# Patient Record
Sex: FEMALE | Race: WHITE | NOT HISPANIC OR LATINO | Employment: OTHER | URBAN - METROPOLITAN AREA
[De-identification: names, ages, dates, MRNs, and addresses within clinical notes are randomized per-mention and may not be internally consistent; named-entity substitution may affect disease eponyms.]

---

## 2017-01-09 ENCOUNTER — PATIENT MESSAGE (OUTPATIENT)
Dept: NEUROLOGY | Facility: MEDICAL CENTER | Age: 47
End: 2017-01-09

## 2017-01-09 DIAGNOSIS — G43.709 CHRONIC MIGRAINE WITHOUT AURA WITHOUT STATUS MIGRAINOSUS, NOT INTRACTABLE: ICD-10-CM

## 2017-01-10 ENCOUNTER — TELEPHONE (OUTPATIENT)
Dept: NEUROLOGY | Facility: MEDICAL CENTER | Age: 47
End: 2017-01-10

## 2017-02-21 DIAGNOSIS — G43.109 MIGRAINE WITH AURA AND WITHOUT STATUS MIGRAINOSUS, NOT INTRACTABLE: ICD-10-CM

## 2017-02-21 RX ORDER — AMITRIPTYLINE HYDROCHLORIDE 10 MG/1
TABLET, FILM COATED ORAL
Qty: 150 TAB | Refills: 3 | Status: SHIPPED | OUTPATIENT
Start: 2017-02-21 | End: 2017-05-22

## 2017-02-21 NOTE — TELEPHONE ENCOUNTER
Was the patient seen in the last year in this department? Yes     Does patient have an active prescription for medications requested? No     Received Request Via: Pharmacy       90 day supply request.

## 2017-02-25 RX ORDER — ACYCLOVIR 400 MG/1
TABLET ORAL
Qty: 60 TAB | Refills: 0 | Status: SHIPPED | OUTPATIENT
Start: 2017-02-25 | End: 2017-04-02 | Stop reason: SDUPTHER

## 2017-04-03 RX ORDER — ACYCLOVIR 400 MG/1
TABLET ORAL
Qty: 60 TAB | Refills: 2 | Status: SHIPPED | OUTPATIENT
Start: 2017-04-03 | End: 2017-07-10 | Stop reason: SDUPTHER

## 2017-05-22 RX ORDER — AMITRIPTYLINE HYDROCHLORIDE 25 MG/1
TABLET, FILM COATED ORAL
Qty: 90 TAB | Refills: 1 | Status: SHIPPED | OUTPATIENT
Start: 2017-05-22 | End: 2017-06-08

## 2017-06-04 ENCOUNTER — HOSPITAL ENCOUNTER (EMERGENCY)
Facility: MEDICAL CENTER | Age: 47
End: 2017-06-04
Attending: EMERGENCY MEDICINE
Payer: COMMERCIAL

## 2017-06-04 VITALS
DIASTOLIC BLOOD PRESSURE: 63 MMHG | BODY MASS INDEX: 24.31 KG/M2 | OXYGEN SATURATION: 100 % | WEIGHT: 142.42 LBS | SYSTOLIC BLOOD PRESSURE: 102 MMHG | HEART RATE: 90 BPM | HEIGHT: 64 IN | RESPIRATION RATE: 14 BRPM | TEMPERATURE: 99.5 F

## 2017-06-04 DIAGNOSIS — R20.2 PARESTHESIA OF BILATERAL LEGS: ICD-10-CM

## 2017-06-04 LAB
ALBUMIN SERPL BCP-MCNC: 4.3 G/DL (ref 3.2–4.9)
ALBUMIN/GLOB SERPL: 1.7 G/DL
ALP SERPL-CCNC: 40 U/L (ref 30–99)
ALT SERPL-CCNC: 23 U/L (ref 2–50)
ANION GAP SERPL CALC-SCNC: 7 MMOL/L (ref 0–11.9)
APTT PPP: 27.1 SEC (ref 24.7–36)
AST SERPL-CCNC: 16 U/L (ref 12–45)
BASOPHILS # BLD AUTO: 0.7 % (ref 0–1.8)
BASOPHILS # BLD: 0.05 K/UL (ref 0–0.12)
BILIRUB SERPL-MCNC: 0.3 MG/DL (ref 0.1–1.5)
BUN SERPL-MCNC: 9 MG/DL (ref 8–22)
CALCIUM SERPL-MCNC: 9.6 MG/DL (ref 8.5–10.5)
CHLORIDE SERPL-SCNC: 107 MMOL/L (ref 96–112)
CK SERPL-CCNC: 41 U/L (ref 0–154)
CO2 SERPL-SCNC: 23 MMOL/L (ref 20–33)
CREAT SERPL-MCNC: 0.64 MG/DL (ref 0.5–1.4)
EOSINOPHIL # BLD AUTO: 0.05 K/UL (ref 0–0.51)
EOSINOPHIL NFR BLD: 0.7 % (ref 0–6.9)
ERYTHROCYTE [DISTWIDTH] IN BLOOD BY AUTOMATED COUNT: 44.9 FL (ref 35.9–50)
GFR SERPL CREATININE-BSD FRML MDRD: >60 ML/MIN/1.73 M 2
GLOBULIN SER CALC-MCNC: 2.6 G/DL (ref 1.9–3.5)
GLUCOSE SERPL-MCNC: 94 MG/DL (ref 65–99)
HCT VFR BLD AUTO: 37.9 % (ref 37–47)
HGB BLD-MCNC: 12.5 G/DL (ref 12–16)
IMM GRANULOCYTES # BLD AUTO: 0.02 K/UL (ref 0–0.11)
IMM GRANULOCYTES NFR BLD AUTO: 0.3 % (ref 0–0.9)
INR PPP: 0.95 (ref 0.87–1.13)
LYMPHOCYTES # BLD AUTO: 2.04 K/UL (ref 1–4.8)
LYMPHOCYTES NFR BLD: 29.1 % (ref 22–41)
MAGNESIUM SERPL-MCNC: 2.1 MG/DL (ref 1.5–2.5)
MCH RBC QN AUTO: 32.8 PG (ref 27–33)
MCHC RBC AUTO-ENTMCNC: 33 G/DL (ref 33.6–35)
MCV RBC AUTO: 99.5 FL (ref 81.4–97.8)
MONOCYTES # BLD AUTO: 0.43 K/UL (ref 0–0.85)
MONOCYTES NFR BLD AUTO: 6.1 % (ref 0–13.4)
NEUTROPHILS # BLD AUTO: 4.42 K/UL (ref 2–7.15)
NEUTROPHILS NFR BLD: 63.1 % (ref 44–72)
NRBC # BLD AUTO: 0 K/UL
NRBC BLD AUTO-RTO: 0 /100 WBC
PLATELET # BLD AUTO: 341 K/UL (ref 164–446)
PMV BLD AUTO: 9.3 FL (ref 9–12.9)
POTASSIUM SERPL-SCNC: 3.7 MMOL/L (ref 3.6–5.5)
PROT SERPL-MCNC: 6.9 G/DL (ref 6–8.2)
PROTHROMBIN TIME: 13 SEC (ref 12–14.6)
RBC # BLD AUTO: 3.81 M/UL (ref 4.2–5.4)
SODIUM SERPL-SCNC: 137 MMOL/L (ref 135–145)
T4 FREE SERPL-MCNC: 0.83 NG/DL (ref 0.53–1.43)
TSH SERPL DL<=0.005 MIU/L-ACNC: 2.63 UIU/ML (ref 0.3–3.7)
WBC # BLD AUTO: 7 K/UL (ref 4.8–10.8)

## 2017-06-04 PROCEDURE — 84443 ASSAY THYROID STIM HORMONE: CPT

## 2017-06-04 PROCEDURE — 85730 THROMBOPLASTIN TIME PARTIAL: CPT

## 2017-06-04 PROCEDURE — 83735 ASSAY OF MAGNESIUM: CPT

## 2017-06-04 PROCEDURE — 82550 ASSAY OF CK (CPK): CPT

## 2017-06-04 PROCEDURE — 85025 COMPLETE CBC W/AUTO DIFF WBC: CPT

## 2017-06-04 PROCEDURE — 84439 ASSAY OF FREE THYROXINE: CPT

## 2017-06-04 PROCEDURE — 85610 PROTHROMBIN TIME: CPT

## 2017-06-04 PROCEDURE — 99284 EMERGENCY DEPT VISIT MOD MDM: CPT

## 2017-06-04 PROCEDURE — 80053 COMPREHEN METABOLIC PANEL: CPT

## 2017-06-04 ASSESSMENT — LIFESTYLE VARIABLES: DO YOU DRINK ALCOHOL: NO

## 2017-06-04 ASSESSMENT — PAIN SCALES - GENERAL
PAINLEVEL_OUTOF10: 0
PAINLEVEL_OUTOF10: 0

## 2017-06-04 NOTE — ED AVS SNAPSHOT
Home Care Instructions                                                                                                                Ct Montalvo   MRN: [id number]    Department:  [facility], Emergency Dept   Date of Visit:  6/4/2017            [facility], Emergency Dept    [address] [address]    Phone:  [phone]      You were seen by     Vijay Boykin M.D.      Your Diagnosis Was     Paresthesia of bilateral legs     R20.2       Follow-up Information     1. Follow up with [facility], Emergency Dept.    Specialty:  Emergency Medicine    Why:  If symptoms worsen    Contact information    [address] [address] [phone]        2. Follow up with JUDD Briscoe.    Specialty:  Family Medicine    Contact information    [address]  [address]  [facility] [address] [phone]          3. Follow up with Viviane Kent PA-C.    Specialty:  Neurology    Contact information    [address]  [facility] [address] [phone]        Medication Information     Review all of your home medications and newly ordered medications with your primary doctor and/or pharmacist as soon as possible. Follow medication instructions as directed by your doctor and/or pharmacist.     Please keep your complete medication list with you and share with your physician. Update the information when medications are discontinued, doses are changed, or new medications (including over-the-counter products) are added; and carry medication information at all times in the event of emergency situations.               Medication List      ASK your doctor about these medications        Instructions    Morning Afternoon Evening Bedtime    acyclovir 400 MG tablet   Commonly known as:  ZOVIRAX        TAKE 2 TABS BY MOUTH 2 TIMES A DAY FOR 5 DAYS.                        amitriptyline 25 MG Tabs   Commonly known as:  ELAVIL       TAKE 0.5-1 TABS BY MOUTH AT BEDTIME AS NEEDED                        levothyroxine 25 MCG Tabs   Commonly known as:  SYNTHROID        Take 1 Tab by mouth Every morning on an empty stomach.   Dose:  25 mcg                        mometasone 50 MCG/ACT nasal spray   Commonly known as:  NASONEX        Spray 2 Sprays in nose every day.   Dose:  2 Spray                        montelukast 10 MG Tabs   Commonly known as:  SINGULAIR        Take 1 Tab by mouth every day.   Dose:  10 mg                        pentosan 100 MG Caps   Commonly known as:  ELMIRON        Take 300 mg by mouth every day. Indications: Bladder Wall Inflammation   Dose:  300 mg                        phenazopyridine 200 MG Tabs   Commonly known as:  PYRIDIUM        Take 200 mg by mouth 3 times a day as needed.   Dose:  200 mg                        promethazine 25 MG Tabs   Commonly known as:  PHENERGAN        1/2 to full tab at onset of severe headache.  Can repeat once 4 hours later for nausea.                        RELPAX 40 MG tablet   Generic drug:  eletriptan        1/2 to one tab at onset of headache.  Repeat once in 24H if headache persists two hours after first dose                        * rizatriptan 10 MG tablet   Commonly known as:  MAXALT        Take 1 Tab by mouth Once PRN for Migraine for up to 1 dose.   Dose:  10 mg                        * rizatriptan 10 MG tablet   Commonly known as:  MAXALT        Take 1 Tab by mouth Once PRN for Migraine for up to 1 dose.   Dose:  10 mg                        valacyclovir 500 MG Tabs   Commonly known as:  VALTREX        Take 1,000 mg by mouth every day. Indications: Herpes Simplex affecting the Lips or Nostrils   Dose:  1000 mg                        vitamin D 1000 UNIT Tabs   Commonly known as:  cholecalciferol        Take 2,000 Units by mouth every day.   Dose:  2000 Units                        zolmitriptan 5 MG nasal solution   Commonly known as:  ZOMIG        Spray 1 Spray in nose Once PRN  for Headache for up to 1 dose.   Dose:  1 Spray                        * Notice:  This list has 2 medication(s) that are the same as other medications prescribed for you. Read the directions carefully, and ask your doctor or other care provider to review them with you.            Procedures and tests performed during your visit     APTT    CBC WITH DIFFERENTIAL    COMP METABOLIC PANEL    CREATINE KINASE    ESTIMATED GFR    FREE THYROXINE    IV Saline Lock    MAGNESIUM    PROTHROMBIN TIME    TSH        Discharge Instructions       Paresthesia  Paresthesia is an abnormal burning or prickling sensation. This sensation is generally felt in the hands, arms, legs, or feet. However, it may occur in any part of the body. Usually, it is not painful. The feeling may be described as:  · Tingling or numbness.  · Pins and needles.  · Skin crawling.  · Buzzing.  · Limbs falling asleep.  · Itching.  Most people experience temporary (transient) paresthesia at some time in their lives. Paresthesia may occur when you breathe too quickly (hyperventilation). It can also occur without any apparent cause. Commonly, paresthesia occurs when pressure is placed on a nerve. The sensation quickly goes away after the pressure is removed. For some people, however, paresthesia is a long-lasting (chronic) condition that is caused by an underlying disorder. If you continue to have paresthesia, you may need further medical evaluation.  HOME CARE INSTRUCTIONS  Watch your condition for any changes. Taking the following actions may help to lessen any discomfort that you are feeling:  · Avoid drinking alcohol.  · Try acupuncture or massage to help relieve your symptoms.  · Keep all follow-up visits as directed by your health care provider. This is important.  SEEK MEDICAL CARE IF:  · You continue to have episodes of paresthesia.  · Your burning or prickling feeling gets worse when you walk.  · You have pain, cramps, or dizziness.  · You develop a  rash.  SEEK IMMEDIATE MEDICAL CARE IF:  · You feel weak.  · You have trouble walking or moving.  · You have problems with speech, understanding, or vision.  · You feel confused.  · You cannot control your bladder or bowel movements.  · You have numbness after an injury.  · You faint.     This information is not intended to replace advice given to you by your health care provider. Make sure you discuss any questions you have with your health care provider.     Document Released: 12/08/2003 Document Revised: 05/03/2016 Document Reviewed: 12/14/2015  [employer] Interactive Patient Education ©2016 [employer]    Peripheral Neuropathy  Peripheral neuropathy is a type of nerve damage. It affects nerves that carry signals between the spinal cord and other parts of the body. These are called peripheral nerves. With peripheral neuropathy, one nerve or a group of nerves may be damaged.   CAUSES   Many things can damage peripheral nerves. For some people with peripheral neuropathy, the cause is unknown. Some causes include:  · Diabetes. This is the most common cause of peripheral neuropathy.  · Injury to a nerve.  · Pressure or stress on a nerve that lasts a long time.  · Too little vitamin B. Alcoholism can lead to this.  · Infections.  · Autoimmune diseases, such as multiple sclerosis and systemic lupus erythematosus.  · Inherited nerve diseases.  · Some medicines, such as cancer drugs.  · Toxic substances, such as lead and mercury.  · Too little blood flowing to the legs.  · Kidney disease.  · Thyroid disease.  SIGNS AND SYMPTOMS   Different people have different symptoms. The symptoms you have will depend on which of your nerves is damaged.  Common symptoms include:  · Loss of feeling (numbness) in the feet and hands.  · Tingling in the feet and hands.  · Pain that burns.  · Very sensitive skin.  · Weakness.  · Not being able to move a part of the body (paralysis).  · Muscle twitching.  · Clumsiness or poor  coordination.  · Loss of balance.  · Not being able to control your bladder.  · Feeling dizzy.  · Sexual problems.  DIAGNOSIS   Peripheral neuropathy is a symptom, not a disease. Finding the cause of peripheral neuropathy can be hard. To figure that out, your health care provider will take a medical history and do a physical exam. A neurological exam will also be done. This involves checking things affected by your brain, spinal cord, and nerves (nervous system). For example, your health care provider will check your reflexes, how you move, and what you can feel.   Other types of tests may also be ordered, such as:  · Blood tests.  · A test of the fluid in your spinal cord.  · Imaging tests, such as CT scans or an MRI.  · Electromyography (EMG). This test checks the nerves that control muscles.  · Nerve conduction velocity tests. These tests check how fast messages pass through your nerves.  · Nerve biopsy. A small piece of nerve is removed. It is then checked under a microscope.  TREATMENT   · Medicine is often used to treat peripheral neuropathy. Medicines may include:  ¨ Pain-relieving medicines. Prescription or over-the-counter medicine may be suggested.  ¨ Antiseizure medicine. This may be used for pain.  ¨ Antidepressants. These also may help ease pain from neuropathy.  ¨ Lidocaine. This is a numbing medicine. You might wear a patch or be given a shot.  ¨ Mexiletine. This medicine is typically used to help control irregular heart rhythms.  · Surgery. Surgery may be needed to relieve pressure on a nerve or to destroy a nerve that is causing pain.  · Physical therapy to help movement.  · Assistive devices to help movement.  HOME CARE INSTRUCTIONS   · Only take over-the-counter or prescription medicines as directed by your health care provider. Follow the instructions carefully for any given medicines. Do not take any other medicines without first getting approval from your health care provider.  · If you have  diabetes, work closely with your health care provider to keep your blood sugar under control.  · If you have numbness in your feet:  ¨ Check every day for signs of injury or infection. Watch for redness, warmth, and swelling.  ¨ Wear padded socks and comfortable shoes. These help protect your feet.  · Do not do things that put pressure on your damaged nerve.  · Do not smoke. Smoking keeps blood from getting to damaged nerves.  · Avoid or limit alcohol. Too much alcohol can cause a lack of B vitamins. These vitamins are needed for healthy nerves.  · Develop a good support system. Coping with peripheral neuropathy can be stressful. Talk to a mental health specialist or join a support group if you are struggling.  · Follow up with your health care provider as directed.  SEEK MEDICAL CARE IF:   · You have new signs or symptoms of peripheral neuropathy.  · You are struggling emotionally from dealing with peripheral neuropathy.  · You have a fever.  SEEK IMMEDIATE MEDICAL CARE IF:   · You have an injury or infection that is not healing.  · You feel very dizzy or begin vomiting.  · You have chest pain.  · You have trouble breathing.     This information is not intended to replace advice given to you by your health care provider. Make sure you discuss any questions you have with your health care provider.     Document Released: 12/08/2003 Document Revised: 08/29/2012 Document Reviewed: 08/25/2014  Elsevier Interactive Patient Education ©2016 [employer] Inc.            Patient Information     Patient Information    Following emergency treatment: all patient requiring follow-up care must return either to a private physician or a clinic if your condition worsens before you are able to obtain further medical attention, please return to the emergency room.     Billing Information    At [facility], we work to make the billing process streamlined for our patients.  Our Representatives are here to answer any questions you may have  regarding your hospital bill.  If you have insurance coverage and have supplied your insurance information to us, we will submit a claim to your insurer on your behalf.  Should you have any questions regarding your bill, we can be reached online or by phone as follows:  Online: You are able pay your bills online or live chat with our representatives about any billing questions you may have. We are here to help Monday - Friday from 8:00am to 7:30pm and 9:00am - 12:00pm on Saturdays.  Please visit [email or web address]  for more information.   Phone:  [phone] or [phone]    Please note that your emergency physician, surgeon, pathologist, radiologist, anesthesiologist, and other specialists are not employed by [facility] and will therefore bill separately for their services.  Please contact them directly for any questions concerning their bills at the numbers below:     Emergency Physician Services:  [phone]  [facility]:  [phone]  [facility]:  [phone]  [facility]:  [phone]    1. Your final bill may vary from the amount quoted upon discharge if all procedures are not complete at that time, or if your doctor has additional procedures of which we are not aware. You will receive an additional bill if you return to the Emergency Department at [facility] for suture removal regardless of the facility of which the sutures were placed.     2. Please arrange for settlement of this account at the emergency registration.    3. All self-pay accounts are due in full at the time of treatment.  If you are unable to meet this obligation then payment is expected within 4-5 days.     4. If you have had radiology studies (CT, X-ray, Ultrasound, MRI), you have received a preliminary result during your emergency department visit. Please contact the radiology department [phone] to receive a copy of your final result.  Please discuss the Final result with your primary physician or with the follow up physician provided.     Crisis Hotline:  [place] Crisis Hotline:  4-764-FRYOVTI or 1-715.824.7163  [place] Crisis Hotline:    1-568.806.8855 or 039-651-5209         ED Discharge Follow Up Questions    1. In order to provide you with very good care, we would like to follow up with a phone call in the next few days.  May we have your permission to contact you?     YES /  NO    2. What is the best phone number to call you? (       )_____-__________    3. What is the best time to call you?      Morning  /  Afternoon  /  Evening                   Patient Signature:  ____________________________________________________________    Date:  ____________________________________________________________

## 2017-06-04 NOTE — ED PROVIDER NOTES
"ED Provider Note    Scribed for Vijay Boykin M.D. by Daniel Abdullahi. 6/4/2017  12:19 PM    Primary care provider: JUDD Briscoe  Means of arrival: Walk In  History obtained from: Patient  History limited by: None     CHIEF COMPLAINT  Chief Complaint   Patient presents with   • Numbness     bilateral feet x6days     HPI  Ct Montalvo is a 46 y.o. female who presents to the Emergency Department complaining of bilateral toe numbness. The patient had an onset of bilateral toe numbness six days ago. She describes her numbness as feeling \"prickling\". Her toe numbness has progressively radiated up her bilateral legs in the last 6 days. Today, patient's numbness has radiated into her bilateral knees intermittently. Currently she complains of numbness in her bilateral toes, balls of feet, and knees. Her numbness is slightly greater in her right lower extremity compared with her left lower extremity. She denies any change in muscle strength associated with her numbness. She denies any pain. Patient denies any recent injuries to her lower extremities.   Patient currently takes daily Elmiron medication. She notes that a few days ago she took 5 doses of Elmiron on accident. She is not sure exactly what day in the last week she took the 5 doses of Elmiron. Patient is followed by Dr. Kent, neurology, for her history of migraines.  The patient denies any changes in speech, changes in vision, weakness, nausea, vomiting, neck pain, chest pain, shortness of breath, diarrhea.      REVIEW OF SYSTEMS  Pertinent positives include bilateral lower leg numbness. Pertinent negatives include no weakness, changes in speech, changes in vision, nausea, vomiting, neck pain, chest pain, shortness of breath, diarrhea.  All other systems reviewed and negative.    C.     PAST MEDICAL HISTORY   has a past medical history of Kidney disease; GERD (gastroesophageal reflux disease); Thyroid disease; and Migraine.    SURGICAL " "HISTORY  patient denies any surgical history    SOCIAL HISTORY  Social History   Substance Use Topics   • Smoking status: Never Smoker    • Smokeless tobacco: Never Used   • Alcohol Use: No      Comment: social      History   Drug Use No     FAMILY HISTORY  Family History   Problem Relation Age of Onset   • Heart Disease Mother      chf   • Stroke Mother    • Cancer Mother      minor skin cancer   • Hyperlipidemia Brother    • Cancer Maternal Grandmother      brca-surv; gyn ca-surviv   • Diabetes Paternal Grandmother      CURRENT MEDICATIONS  Home Medications     Reviewed by Alina Alexis R.N. (Registered Nurse) on 06/04/17 at 1149  Med List Status: Partial    Medication Last Dose Status    acyclovir (ZOVIRAX) 400 MG tablet 6/4/2017 Active    amitriptyline (ELAVIL) 25 MG Tab 6/4/2017 Active    levothyroxine (SYNTHROID) 25 MCG Tab 6/4/2017 Active    mometasone (NASONEX) 50 MCG/ACT nasal spray PRN Active    montelukast (SINGULAIR) 10 MG Tab 6/4/2017 Active    pentosan (ELMIRON) 100 MG Cap 6/4/2017 Active    phenazopyridine (PYRIDIUM) 200 MG Tab prn Active    promethazine (PHENERGAN) 25 MG Tab prn Active    RELPAX 40 MG tablet prn Active    rizatriptan (MAXALT) 10 MG tablet 6/4/2017 Active    rizatriptan (MAXALT) 10 MG tablet  Active    valacyclovir (VALTREX) 500 MG Tab PRN Active    vitamin D (CHOLECALCIFEROL) 1000 UNIT Tab 6/4/2017 Active    zolmitriptan (ZOMIG) 5 MG nasal solution prn Active              ALLERGIES  Allergies   Allergen Reactions   • Sulfa Drugs Unspecified     Internal burning sensation     PHYSICAL EXAM  VITAL SIGNS: /65 mmHg  Pulse 100  Temp(Src) 37.5 °C (99.5 °F)  Resp 16  Ht 1.626 m (5' 4\")  Wt 64.6 kg (142 lb 6.7 oz)  BMI 24.43 kg/m2  SpO2 99%  LMP  (Exact Date)    Constitutional: Well developed, Well nourished, no distress, Non-toxic appearance.   HENT: Normocephalic, Atraumatic, Bilateral external ears normal, Oropharynx moist, No oral exudates.   Eyes: PERRLA, EOMI, " Conjunctiva normal, No discharge.   Neck: No tenderness, Supple, No stridor.   Lymphatic: No lymphadenopathy noted.   Cardiovascular: Normal heart rate, Normal rhythm.   Thorax & Lungs: Clear to auscultation bilaterally, No respiratory distress, No wheezing, No crackles.   Abdomen: Soft, No tenderness, No masses, No pulsatile masses.   Skin: Warm, Dry, No erythema, No rash.   Extremities:, No edema No cyanosis.   Musculoskeletal: No tenderness to palpation or major deformities noted.  Intact distal pulses  Neurologic: Awake, alert. Cranial nerves 2-11 intact, muscle strength 5/5 in bilateral upper and lower extremities. 2+ dorsalis pedis, 2+ posterior tibialis, mild sensory changes more pronounced in the feet, some from the knees down, 2+ DTR bilateral biceps. Brisk DTR bilateral patellar tendons. 1+ DTR in achilles bilaterally.   Psychiatric: Affect normal, Judgment normal, Mood normal.     LABS  Labs Reviewed   CBC WITH DIFFERENTIAL - Abnormal; Notable for the following:     RBC 3.81 (*)     MCV 99.5 (*)     MCHC 33.0 (*)     All other components within normal limits    Narrative:     Indicate which anticoagulants the patient is on:->NONE   COMP METABOLIC PANEL    Narrative:     Indicate which anticoagulants the patient is on:->NONE   APTT    Narrative:     Indicate which anticoagulants the patient is on:->NONE   PROTHROMBIN TIME    Narrative:     Indicate which anticoagulants the patient is on:->NONE   TSH    Narrative:     Indicate which anticoagulants the patient is on:->NONE   FREE THYROXINE    Narrative:     Indicate which anticoagulants the patient is on:->NONE   MAGNESIUM    Narrative:     Indicate which anticoagulants the patient is on:->NONE   CREATINE KINASE    Narrative:     Indicate which anticoagulants the patient is on:->NONE   ESTIMATED GFR    Narrative:     Indicate which anticoagulants the patient is on:->NONE     All labs reviewed by me.  COURSE & MEDICAL DECISION MAKING  Pertinent Labs & Imaging  studies reviewed. (See chart for details)    I reviewed the patient's medical records which showed patient has an extensive history of migraines.     12:19 PM - Patient seen and examined at bedside. Ordered TSH, free thyroxine, magnesium, creatine kinase, CBC, CMP, APTT, prothrombin time to evaluate her symptoms. Discussed patient's previous MRI-Head results in December 2016.    The differential diagnoses include but are not limited to: renal failure, thyroid abnormality, electrolyte abnormality, multiple sclerosis, sciatica, Guillain-Barré syndrome, peripheral neuropathy.     2:40 PM Paged neurology     2:45 PM Spoke with Dr. Colunga, Neurology, about the patient's condition. Dr. Colunga advised patient follow up with his office as an outpatient.      3:05 PM Recheck: Patient re-evaluated at beside. Patient was informed I spoke with Dr. Colunga, who instructed patient to continue to monitor her symptoms and follow up with office as an outpatient.   The patient was advised to return to the ED for focal weakness.     Decision Making:  Patient is coming in with progressive paresthesias of the bilateral lower extremities up until the distal femur area, she has normal gross sensation she has hyperreflexia in her patellar tendons, decreased reflexes in her Achilles tendon but they're still there, has great strength throughout, no other neurologic signs or symptoms, I ruled out metabolic causes including thyroid. Discussed the case with neurologist on call, I do not necessarily believe that this is a early presentation of Guillain-Barré given there is no weakness involved, neurologist feels the patient follow-up as an outpatient, the patient is very established with a [facility] clinic, the patient will also follow up with her primary care physician, return with worsening symptoms or weakness or any other concerns. I do not believe that this represents a stroke given the bilateral nature and just sensory component.    The  patient is referred to a primary physician for blood pressure management, diabetic screening, and for all other preventative health concerns.    DISPOSITION:  Patient will be discharged home in stable condition.    FOLLOW UP:  [facility], Emergency Dept  [address] [address] [phone]    If symptoms worsen    Tena Mata A.P.R.NDante  [address]  [address]  [facility] [address] [phone]          Viviane Kent PA-C  [address]  [facility] [address] [phone]          FINAL IMPRESSION  1. Paresthesia of bilateral legs          IDaniel (Scribe), am scribing for, and in the presence of, Vijay Boykin M.D..    Electronically signed by: Daniel Abdullahi (Scribe), 6/4/2017    IVijay M.D. personally performed the services described in this documentation, as scribed by Daniel Abdullahi in my presence, and it is both accurate and complete.    The note accurately reflects work and decisions made by me.  Vijay Boykin  6/4/2017  6:31 PM

## 2017-06-04 NOTE — DISCHARGE INSTRUCTIONS
Paresthesia  Paresthesia is an abnormal burning or prickling sensation. This sensation is generally felt in the hands, arms, legs, or feet. However, it may occur in any part of the body. Usually, it is not painful. The feeling may be described as:  · Tingling or numbness.  · Pins and needles.  · Skin crawling.  · Buzzing.  · Limbs falling asleep.  · Itching.  Most people experience temporary (transient) paresthesia at some time in their lives. Paresthesia may occur when you breathe too quickly (hyperventilation). It can also occur without any apparent cause. Commonly, paresthesia occurs when pressure is placed on a nerve. The sensation quickly goes away after the pressure is removed. For some people, however, paresthesia is a long-lasting (chronic) condition that is caused by an underlying disorder. If you continue to have paresthesia, you may need further medical evaluation.  HOME CARE INSTRUCTIONS  Watch your condition for any changes. Taking the following actions may help to lessen any discomfort that you are feeling:  · Avoid drinking alcohol.  · Try acupuncture or massage to help relieve your symptoms.  · Keep all follow-up visits as directed by your health care provider. This is important.  SEEK MEDICAL CARE IF:  · You continue to have episodes of paresthesia.  · Your burning or prickling feeling gets worse when you walk.  · You have pain, cramps, or dizziness.  · You develop a rash.  SEEK IMMEDIATE MEDICAL CARE IF:  · You feel weak.  · You have trouble walking or moving.  · You have problems with speech, understanding, or vision.  · You feel confused.  · You cannot control your bladder or bowel movements.  · You have numbness after an injury.  · You faint.     This information is not intended to replace advice given to you by your health care provider. Make sure you discuss any questions you have with your health care provider.     Document Released: 12/08/2003 Document Revised: 05/03/2016 Document Reviewed:  12/14/2015  [employer] Interactive Patient Education ©2016 [employer]    Peripheral Neuropathy  Peripheral neuropathy is a type of nerve damage. It affects nerves that carry signals between the spinal cord and other parts of the body. These are called peripheral nerves. With peripheral neuropathy, one nerve or a group of nerves may be damaged.   CAUSES   Many things can damage peripheral nerves. For some people with peripheral neuropathy, the cause is unknown. Some causes include:  · Diabetes. This is the most common cause of peripheral neuropathy.  · Injury to a nerve.  · Pressure or stress on a nerve that lasts a long time.  · Too little vitamin B. Alcoholism can lead to this.  · Infections.  · Autoimmune diseases, such as multiple sclerosis and systemic lupus erythematosus.  · Inherited nerve diseases.  · Some medicines, such as cancer drugs.  · Toxic substances, such as lead and mercury.  · Too little blood flowing to the legs.  · Kidney disease.  · Thyroid disease.  SIGNS AND SYMPTOMS   Different people have different symptoms. The symptoms you have will depend on which of your nerves is damaged.  Common symptoms include:  · Loss of feeling (numbness) in the feet and hands.  · Tingling in the feet and hands.  · Pain that burns.  · Very sensitive skin.  · Weakness.  · Not being able to move a part of the body (paralysis).  · Muscle twitching.  · Clumsiness or poor coordination.  · Loss of balance.  · Not being able to control your bladder.  · Feeling dizzy.  · Sexual problems.  DIAGNOSIS   Peripheral neuropathy is a symptom, not a disease. Finding the cause of peripheral neuropathy can be hard. To figure that out, your health care provider will take a medical history and do a physical exam. A neurological exam will also be done. This involves checking things affected by your brain, spinal cord, and nerves (nervous system). For example, your health care provider will check your reflexes, how you move, and what  you can feel.   Other types of tests may also be ordered, such as:  · Blood tests.  · A test of the fluid in your spinal cord.  · Imaging tests, such as CT scans or an MRI.  · Electromyography (EMG). This test checks the nerves that control muscles.  · Nerve conduction velocity tests. These tests check how fast messages pass through your nerves.  · Nerve biopsy. A small piece of nerve is removed. It is then checked under a microscope.  TREATMENT   · Medicine is often used to treat peripheral neuropathy. Medicines may include:  ¨ Pain-relieving medicines. Prescription or over-the-counter medicine may be suggested.  ¨ Antiseizure medicine. This may be used for pain.  ¨ Antidepressants. These also may help ease pain from neuropathy.  ¨ Lidocaine. This is a numbing medicine. You might wear a patch or be given a shot.  ¨ Mexiletine. This medicine is typically used to help control irregular heart rhythms.  · Surgery. Surgery may be needed to relieve pressure on a nerve or to destroy a nerve that is causing pain.  · Physical therapy to help movement.  · Assistive devices to help movement.  HOME CARE INSTRUCTIONS   · Only take over-the-counter or prescription medicines as directed by your health care provider. Follow the instructions carefully for any given medicines. Do not take any other medicines without first getting approval from your health care provider.  · If you have diabetes, work closely with your health care provider to keep your blood sugar under control.  · If you have numbness in your feet:  ¨ Check every day for signs of injury or infection. Watch for redness, warmth, and swelling.  ¨ Wear padded socks and comfortable shoes. These help protect your feet.  · Do not do things that put pressure on your damaged nerve.  · Do not smoke. Smoking keeps blood from getting to damaged nerves.  · Avoid or limit alcohol. Too much alcohol can cause a lack of B vitamins. These vitamins are needed for healthy  nerves.  · Develop a good support system. Coping with peripheral neuropathy can be stressful. Talk to a mental health specialist or join a support group if you are struggling.  · Follow up with your health care provider as directed.  SEEK MEDICAL CARE IF:   · You have new signs or symptoms of peripheral neuropathy.  · You are struggling emotionally from dealing with peripheral neuropathy.  · You have a fever.  SEEK IMMEDIATE MEDICAL CARE IF:   · You have an injury or infection that is not healing.  · You feel very dizzy or begin vomiting.  · You have chest pain.  · You have trouble breathing.     This information is not intended to replace advice given to you by your health care provider. Make sure you discuss any questions you have with your health care provider.     Document Released: 12/08/2003 Document Revised: 08/29/2012 Document Reviewed: 08/25/2014  [employer] Interactive Patient Education ©2016 [employer]

## 2017-06-04 NOTE — ED AVS SNAPSHOT
6/4/2017    Ct Montalvo  [address] [facility] [address]    Dear Ct:    [facility] wants to ensure your discharge home is safe and you or your loved ones have had all of your questions answered regarding your care after you leave the hospital.    Below is a list of resources and contact information should you have any questions regarding your hospital stay, follow-up instructions, or active medical symptoms.    Questions or Concerns Regarding… Contact   Medical Questions Related to Your Discharge  (7 days a week, 8am-5pm) Contact a Nurse Care Coordinator   [phone]   Medical Questions Not Related to Your Discharge  (24 hours a day / 7 days a week)  Contact the Nurse Health Line   [phone]    Medications or Discharge Instructions Refer to your discharge packet   or contact your [facility] Primary Care Provider   [phone]   Follow-up Appointment(s) Schedule your appointment via XO1   or contact Scheduling [phone]   Billing Review your statement via XO1  or contact Billing [phone]   Medical Records Review your records via XO1   or contact Medical Records [phone]     You may receive a telephone call within two days of discharge. This call is to make certain you understand your discharge instructions and have the opportunity to have any questions answered. You can also easily access your medical information, test results and upcoming appointments via the XO1 free online health management tool. You can learn more and sign up at [employer]/XO1. For assistance setting up your XO1 account, please call [phone].    Once again, we want to ensure your discharge home is safe and that you have a clear understanding of any next steps in your care. If you have any questions or concerns, please do not hesitate to contact us, we are here for you. Thank you for choosing [facility] for your healthcare needs.    Sincerely,    Your [facility] Healthcare Team

## 2017-06-04 NOTE — ED NOTES
Chief Complaint   Patient presents with   • Numbness     bilateral feet x6days     Pt ambulated to triage c/o bilateral feet numbness going up to her knees x6days. Denies injury. Strength are equal , ambulating steady.

## 2017-06-04 NOTE — ED NOTES
Vital signs rechecked, gave her a warm blanket. She is up dated on poc, waiting for x1 lab test result

## 2017-06-04 NOTE — ED NOTES
Pt resting comfortably.   Call light within reach  Personal belongings in reach  Bathroom and comfort needs met  VSS     at bedside with ipad

## 2017-06-04 NOTE — ED NOTES
Pt arrived in room   Pt resting comfortably on gurTrenton.   Call light within reach and visible from nurses station.

## 2017-06-04 NOTE — ED AVS SNAPSHOT
[employer] Access Code: Activation code not generated  Current [employer] Status: Active    retickrhart  A secure, online tool to manage your health information     [employer]Works® is a secure, online tool that connects you to your personalized health information from the privacy of your home -- day or night - making it very easy for you to manage your healthcare. Once the activation process is completed, you can even access your medical information using the Applied NanoWorks sarah, which is available for free in the Apple Sarah store or Google Play store.     [employer] provides the following levels of access (as shown below):   My Chart Features   [facility] Primary Care Doctor [facility]  Specialists [facility]  Urgent  Care Non-[facility]  Primary Care  Doctor   Email your healthcare team securely and privately 24/7 X X X X   Manage appointments: schedule your next appointment; view details of past/upcoming appointments X      Request prescription refills. X      View recent personal medical records, including lab and immunizations X X X X   View health record, including health history, allergies, medications X X X X   Read reports about your outpatient visits, procedures, consult and ER notes X X X X   See your discharge summary, which is a recap of your hospital and/or ER visit that includes your diagnosis, lab results, and care plan. X X       How to register for [employer]Works:  1. Go to  [email or web address].  2. Click on the Sign Up Now box, which takes you to the New Member Sign Up page. You will need to provide the following information:  a. Enter your Applied [employer]Works Access Code exactly as it appears at the top of this page. (You will not need to use this code after you’ve completed the sign-up process. If you do not sign up before the expiration date, you must request a new code.)   b. Enter your date of birth.   c. Enter your home email address.   d. Click Submit, and follow the next screen’s instructions.  3. Create a [employer] NanoWorks ID. This will  be your [employer] login ID and cannot be changed, so think of one that is secure and easy to remember.  4. Create a [employer] password. You can change your password at any time.  5. Enter your Password Reset Question and Answer. This can be used at a later time if you forget your password.   6. Enter your e-mail address. This allows you to receive e-mail notifications when new information is available in [employer].  7. Click Sign Up. You can now view your health information.    For assistance activating your [employer] account, call [phone]

## 2017-06-06 ENCOUNTER — TELEPHONE (OUTPATIENT)
Dept: NEUROLOGY | Facility: MEDICAL CENTER | Age: 47
End: 2017-06-06

## 2017-06-06 ENCOUNTER — OFFICE VISIT (OUTPATIENT)
Dept: MEDICAL GROUP | Facility: CLINIC | Age: 47
End: 2017-06-06
Payer: COMMERCIAL

## 2017-06-06 ENCOUNTER — TELEPHONE (OUTPATIENT)
Dept: MEDICAL GROUP | Facility: CLINIC | Age: 47
End: 2017-06-06

## 2017-06-06 VITALS
DIASTOLIC BLOOD PRESSURE: 60 MMHG | HEIGHT: 64 IN | SYSTOLIC BLOOD PRESSURE: 120 MMHG | BODY MASS INDEX: 22.88 KG/M2 | WEIGHT: 134 LBS | HEART RATE: 64 BPM | OXYGEN SATURATION: 99 % | RESPIRATION RATE: 14 BRPM | TEMPERATURE: 99.1 F

## 2017-06-06 DIAGNOSIS — R20.0 BILATERAL LEG NUMBNESS: ICD-10-CM

## 2017-06-06 PROCEDURE — 99214 OFFICE O/P EST MOD 30 MIN: CPT | Performed by: NURSE PRACTITIONER

## 2017-06-06 ASSESSMENT — PATIENT HEALTH QUESTIONNAIRE - PHQ9: CLINICAL INTERPRETATION OF PHQ2 SCORE: 0

## 2017-06-06 NOTE — MR AVS SNAPSHOT
"        Ct Montalvo   2017 2:20 PM   Office Visit   MRN: [id number]    Department:  [facility]   Dept Phone:  [phone]    Description:  Female : 1970   Provider:  JUDD Briscoe           Reason for Visit     Follow-Up bilateral leg numbness and tingling since wednesday night      Allergies as of 2017     Allergen Noted Reactions    Sulfa Drugs 2016   Unspecified    Internal burning sensation      You were diagnosed with     Bilateral leg numbness   [529805]         Vital Signs     Blood Pressure Pulse Temperature Respirations Height Weight    120/60 mmHg 64 37.3 °C (99.1 °F) 14 1.626 m (5' 4.02\") 60.782 kg (134 lb)    Body Mass Index Oxygen Saturation Last Menstrual Period Breastfeeding? Smoking Status       22.99 kg/m2 99% (Exact Date) No Never Smoker        Basic Information     Date Of Birth Sex Race Ethnicity Preferred Language    1970 Female White Non- English      Your appointments     Sep 27, 2017  3:00 PM   New Patient with Jean Marie Shelley M.D.   [facility] (--)    [address], [address]  [place] [address] [phone]           Please bring Photo ID, Insurance Cards, All Medication Bottles and copies of any legal documents (such as Living Will, Power of ) If speaking a language besides English please bring an adult . Please arrive 30 minutes prior for check in and registration. You will be receiving a confirmation call a few days before your appointment from our automated call confirmation system.              Problem List              ICD-10-CM Priority Class Noted - Resolved    Chronic migraine without aura without status migrainosus, not intractable G43.709   2016 - Present    IC (interstitial cystitis) N30.10   2016 - Present    Gastroesophageal reflux disease without esophagitis K21.9   2016 - Present    Hypothyroidism due to acquired atrophy of thyroid E03.4   2016 - " Present    Herpes labialis B00.1   6/15/2016 - Present      Health Maintenance        Date Due Completion Dates    IMM DTaP/Tdap/Td Vaccine (1 - Tdap) 7/26/1989 ---    MAMMOGRAM 11/1/2017 11/1/2016, 10/1/2015 (Done)    Override on 10/1/2015: Done (NL)    PAP SMEAR 10/1/2018 10/1/2015 (Done)    Override on 10/1/2015: Done (Nl, mirian)            Current Immunizations     No immunizations on file.      Below and/or attached are the medications your provider expects you to take. Review all of your home medications and newly ordered medications with your provider and/or pharmacist. Follow medication instructions as directed by your provider and/or pharmacist. Please keep your medication list with you and share with your provider. Update the information when medications are discontinued, doses are changed, or new medications (including over-the-counter products) are added; and carry medication information at all times in the event of emergency situations     Allergies:  SULFA DRUGS - Unspecified               Medications  Valid as of: June 06, 2017 -  4:53 PM    Generic Name Brand Name Tablet Size Instructions for use    Acyclovir (Tab) ZOVIRAX 400 MG TAKE 2 TABS BY MOUTH 2 TIMES A DAY FOR 5 DAYS.        Amitriptyline HCl (Tab) ELAVIL 25 MG TAKE 0.5-1 TABS BY MOUTH AT BEDTIME AS NEEDED        Cholecalciferol (Tab) cholecalciferol 1000 UNIT Take 2,000 Units by mouth every day.        Eletriptan Hydrobromide (Tab) RELPAX 40 MG 1/2 to one tab at onset of headache.  Repeat once in 24H if headache persists two hours after first dose        Levothyroxine Sodium (Tab) SYNTHROID 25 MCG Take 1 Tab by mouth Every morning on an empty stomach.        Mometasone Furoate (Suspension) NASONEX 50 MCG/ACT Spray 2 Sprays in nose every day.        Montelukast Sodium (Tab) SINGULAIR 10 MG Take 1 Tab by mouth every day.        Pentosan Polysulfate Sodium (Cap) ELMIRON 100 MG Take 300 mg by mouth every day. Indications: Bladder Wall  Inflammation        Phenazopyridine HCl (Tab) PYRIDIUM 200 MG Take 200 mg by mouth 3 times a day as needed.        Promethazine HCl (Tab) PHENERGAN 25 MG 1/2 to full tab at onset of severe headache.  Can repeat once 4 hours later for nausea.        Rizatriptan Benzoate (Tab) MAXALT 10 MG Take 1 Tab by mouth Once PRN for Migraine for up to 1 dose.        Rizatriptan Benzoate (Tab) MAXALT 10 MG Take 1 Tab by mouth Once PRN for Migraine for up to 1 dose.        ValACYclovir HCl (Tab) VALTREX 500 MG Take 1,000 mg by mouth every day. Indications: Herpes Simplex affecting the Lips or Nostrils        ZOLMitriptan (Solution) ZOMIG 5 MG Spray 1 Spray in nose Once PRN for Headache for up to 1 dose.        .                 Medicines prescribed today were sent to:     [id number] - [place], [place] - [address] [place] [address]    [address] [place] [address]    Phone: [phone] Fax: [phone]    Open 24 Hours?: No      Medication refill instructions:       If your prescription bottle indicates you have medication refills left, it is not necessary to call your provider’s office. Please contact your pharmacy and they will refill your medication.    If your prescription bottle indicates you do not have any refills left, you may request refills at any time through one of the following ways: The online [employer] system (except Urgent Care), by calling your provider’s office, or by asking your pharmacy to contact your provider’s office with a refill request. Medication refills are processed only during regular business hours and may not be available until the next business day. Your provider may request additional information or to have a follow-up visit with you prior to refilling your medication.   *Please Note: Medication refills are assigned a new Rx number when refilled electronically. Your pharmacy may indicate that no refills were authorized even though a new prescription for the same medication is available at the pharmacy.  Please request the medicine by name with the pharmacy before contacting your provider for a refill.        Your To Do List     Future Labs/Procedures Complete By Expires    CBC WITH DIFFERENTIAL  As directed 6/6/2018    MR-BRAIN-W/O  As directed 6/6/2018    MR-LUMBAR SPINE-W/O  As directed 6/6/2018    VITAMIN B12  As directed 6/6/2018      Referral     A referral request has been sent to our patient care coordination department. Please allow 3-5 business days for us to process this request and contact you either by phone or mail. If you do not hear from us by the 5th business day, please call us at [phone].           echoecho Access Code: Activation code not generated  Current echoecho Status: Active

## 2017-06-06 NOTE — TELEPHONE ENCOUNTER
Thank you, Swetha. Our pt has been notified of the below message. eTna is has ordered imaging for pt.

## 2017-06-06 NOTE — TELEPHONE ENCOUNTER
VOICEMAIL  1. Caller Name: Georgiana                      Call Back Number: [phone] (home)       2. Message: Pt has experienced numbness and tingling in feet that has traveled past her knees. She was seen in ER on 6/4/17 but says she received inferior treatment. Nothing was really done although the ER doctor admitted that he didn't do much and told pt to follow up with her neurologist. She tried but they cant get her in until September. Called other neurologists and the wait time is 4+ months to be seen. Requesting call back and asking if Tena can order tests for MS or something.       3. Patient approves office to leave a detailed voicemail/MyChart message: yes    4. Called pt back. Wednesday night after gym, started in heel/ balls of feet. Tingling and numb. As week progressed, the Sunday, her feet up to her upper legs. But now she has numbness and tingling up to bottom. Pt tried to follow up with Neurology but they were told that pt couldn't be seen until the end of September. Looked in ER notes and saw that ER doctor consulted with neurologist Dr Colunga. Pt states she is unaware of this and wasn't told if a consult even happened. I told pt I would reach out to Dr Colunga's office to see if I could get her scheduled this week. She will make her schedule available for anything we can schedule. Additionally, sending note to Tena to see if there is any testing or if pt should follow up in office. I will call pt back when I have more info

## 2017-06-06 NOTE — TELEPHONE ENCOUNTER
Viviane saw all the notes on this pt, unfortunately we can't get the pt in sooner than sept with a neurologist. We do have a new neurologist Dr. Denis starting in Aug pt can be scheduled with her. In the meanwhile if Tena would like to order MRI.

## 2017-06-06 NOTE — PROGRESS NOTES
"CC: Follow-Up        HPI:     Ct presents today for the followin. Bilateral leg numbness  Her today phone from an ER visit last week on Wednesday-6 days ago. States by the time she was in the ER she had a swollen//heavy sensation in her bilateral legs up to about the knees or the thighs. States last week after going to the gym where she did her usual exercises wearing her usual tennis shoes she began have bilateral foot numbness. This progressed over the last several days to where she continues to have numbness in her bilateral toes and feet however up to her buttocks. She has no weakness. She went to the gym yesterday to see if she was able to complete her usual exercises and had no problem was even able to run on the treadmill.    She states her daughter was watching the way she walks because it felt weird to the patient and the daughter states that she feels that she is walking differently \"not bending her knees normally \".    She denies any flulike prodrome-no fevers no colds no bodyaches no chills. No associated insect bites or risks for these    Did have normal workup for electronic deficiencies and check her thyroid being normal in the emergency room. Did get a consult with neurology which was recommended that she follow-up with them in her office. She however has been unable to get an appointment with neurology until September.      Concerned because her mother has MS.    Normal MRI head 2016    Current Outpatient Prescriptions   Medication Sig Dispense Refill   • amitriptyline (ELAVIL) 25 MG Tab TAKE 0.5-1 TABS BY MOUTH AT BEDTIME AS NEEDED 90 Tab 1   • acyclovir (ZOVIRAX) 400 MG tablet TAKE 2 TABS BY MOUTH 2 TIMES A DAY FOR 5 DAYS. 60 Tab 2   • rizatriptan (MAXALT) 10 MG tablet Take 1 Tab by mouth Once PRN for Migraine for up to 1 dose. 10 Tab 3   • rizatriptan (MAXALT) 10 MG tablet Take 1 Tab by mouth Once PRN for Migraine for up to 1 dose. 10 Tab 3   • levothyroxine (SYNTHROID) 25 MCG " "Tab Take 1 Tab by mouth Every morning on an empty stomach. 30 Tab 5   • montelukast (SINGULAIR) 10 MG Tab Take 1 Tab by mouth every day. 30 Tab 11   • zolmitriptan (ZOMIG) 5 MG nasal solution Spray 1 Spray in nose Once PRN for Headache for up to 1 dose. 10 Each 11   • RELPAX 40 MG tablet 1/2 to one tab at onset of headache.  Repeat once in 24H if headache persists two hours after first dose 10 Tab 11   • promethazine (PHENERGAN) 25 MG Tab 1/2 to full tab at onset of severe headache.  Can repeat once 4 hours later for nausea. 20 Tab 11   • valacyclovir (VALTREX) 500 MG Tab Take 1,000 mg by mouth every day. Indications: Herpes Simplex affecting the Lips or Nostrils     • vitamin D (CHOLECALCIFEROL) 1000 UNIT Tab Take 2,000 Units by mouth every day.     • mometasone (NASONEX) 50 MCG/ACT nasal spray Spray 2 Sprays in nose every day. 1 Inhaler 11   • pentosan (ELMIRON) 100 MG Cap Take 300 mg by mouth every day. Indications: Bladder Wall Inflammation     • phenazopyridine (PYRIDIUM) 200 MG Tab Take 200 mg by mouth 3 times a day as needed.       No current facility-administered medications for this visit.     Social History   Substance Use Topics   • Smoking status: Never Smoker    • Smokeless tobacco: Never Used   • Alcohol Use: No      Comment: social     I reviewed patients allergies, problem list and medications today in [facility].    ROS: Any/all pertinent positives listed in the HPI, otherwise all others reviewed are negative today.      /60 mmHg  Pulse 64  Temp(Src) 37.3 °C (99.1 °F)  Resp 14  Ht 1.626 m (5' 4.02\")  Wt 60.782 kg (134 lb)  BMI 22.99 kg/m2  SpO2 99%  LMP  (Exact Date)  Breastfeeding? No    Exam:    Gen: Alert and oriented, No apparent distress. WDWN  Psych: A+Ox3, normal affect and mood  Skin: Warm, dry and intact. Good turgor   No rashes in visible areas.  Eye: Conjunctiva clear, lids normal  ENMT: Lips without lesions, good dentition  Lungs: Clear to auscultation bilaterally, no rales or " rhonchi   Unlabored respiratory effort.   CV: Regular rate and rhythm, S1, S2. No murmurs.   No Edema  Ext: No clubbing, cyanosis, edema.   Neuro:    CNs: II:PERRLA, III/IV/VI EOM without ptosis or nystagmus, V motor intact, VII facial movements without asymmetry or weakness, iX/X palate midline, XI Neck strength intact, XII tongue protuded midline.  Motor: Strength noted 5/5 upper and lower bilateral extremities with normal tone.  No noted atrophy or deformity of motion.   Reflexes: Bilaterally symmetrical patellar, radial and achilles. As well as radialis biceps and triceps bilaterally  Coordination: No dysmetria with rapid movements  Cerebellar signs: Absent  Tremors : Absent  Sensory: grossly intact to microfilament and light touch with alcohol swab, equal sesation bilaterally both upper and lower extremities  Normal gait      Assessment and Plan.   46 y.o. female with the following issues.    1. Bilateral leg numbness  Stable. Unknown etiology. Will try an urgent referral to Dr. Becerril to see if she seen sooner. Check for B12 abnormality. MRI is ordered as below. Patient does have strict ER precautions for any worsening of symptoms, development of fever or starting see developed weakness. She voiced understanding  - MR-BRAIN-W/O; Future  - MR-LUMBAR SPINE-W/O; Future  - VITAMIN B12; Future  - CBC WITH DIFFERENTIAL; Future  - REFERRAL TO NEUROLOGY

## 2017-06-07 ENCOUNTER — HOSPITAL ENCOUNTER (OUTPATIENT)
Dept: RADIOLOGY | Facility: MEDICAL CENTER | Age: 47
End: 2017-06-07
Attending: NURSE PRACTITIONER
Payer: COMMERCIAL

## 2017-06-07 DIAGNOSIS — R20.0 BILATERAL LEG NUMBNESS: ICD-10-CM

## 2017-06-07 PROCEDURE — 72148 MRI LUMBAR SPINE W/O DYE: CPT

## 2017-06-07 PROCEDURE — 70551 MRI BRAIN STEM W/O DYE: CPT

## 2017-06-08 ENCOUNTER — RESOLUTE PROFESSIONAL BILLING HOSPITAL PROF FEE (OUTPATIENT)
Dept: HOSPITALIST | Facility: MEDICAL CENTER | Age: 47
End: 2017-06-08
Payer: COMMERCIAL

## 2017-06-08 ENCOUNTER — HOSPITAL ENCOUNTER (OUTPATIENT)
Facility: MEDICAL CENTER | Age: 47
End: 2017-06-09
Attending: EMERGENCY MEDICINE | Admitting: HOSPITALIST
Payer: COMMERCIAL

## 2017-06-08 DIAGNOSIS — R20.2 PARESTHESIA: ICD-10-CM

## 2017-06-08 LAB
BASOPHILS # BLD AUTO: 0.8 % (ref 0–1.8)
BASOPHILS # BLD: 0.05 K/UL (ref 0–0.12)
EOSINOPHIL # BLD AUTO: 0.04 K/UL (ref 0–0.51)
EOSINOPHIL NFR BLD: 0.6 % (ref 0–6.9)
ERYTHROCYTE [DISTWIDTH] IN BLOOD BY AUTOMATED COUNT: 43.8 FL (ref 35.9–50)
HCT VFR BLD AUTO: 39.5 % (ref 37–47)
HGB BLD-MCNC: 13.3 G/DL (ref 12–16)
IMM GRANULOCYTES # BLD AUTO: 0.02 K/UL (ref 0–0.11)
IMM GRANULOCYTES NFR BLD AUTO: 0.3 % (ref 0–0.9)
LYMPHOCYTES # BLD AUTO: 2.41 K/UL (ref 1–4.8)
LYMPHOCYTES NFR BLD: 36.6 % (ref 22–41)
MCH RBC QN AUTO: 32.9 PG (ref 27–33)
MCHC RBC AUTO-ENTMCNC: 33.7 G/DL (ref 33.6–35)
MCV RBC AUTO: 97.8 FL (ref 81.4–97.8)
MONOCYTES # BLD AUTO: 0.53 K/UL (ref 0–0.85)
MONOCYTES NFR BLD AUTO: 8 % (ref 0–13.4)
NEUTROPHILS # BLD AUTO: 3.54 K/UL (ref 2–7.15)
NEUTROPHILS NFR BLD: 53.7 % (ref 44–72)
NRBC # BLD AUTO: 0 K/UL
NRBC BLD AUTO-RTO: 0 /100 WBC
PLATELET # BLD AUTO: 354 K/UL (ref 164–446)
PMV BLD AUTO: 9.7 FL (ref 9–12.9)
RBC # BLD AUTO: 4.04 M/UL (ref 4.2–5.4)
TSH SERPL DL<=0.005 MIU/L-ACNC: 3.09 UIU/ML (ref 0.3–3.7)
VIT B12 SERPL-MCNC: 429 PG/ML (ref 211–911)
WBC # BLD AUTO: 6.6 K/UL (ref 4.8–10.8)

## 2017-06-08 PROCEDURE — 82607 VITAMIN B-12: CPT

## 2017-06-08 PROCEDURE — 99220 PR INITIAL OBSERVATION CARE,LEVL III: CPT | Performed by: HOSPITALIST

## 2017-06-08 PROCEDURE — G0378 HOSPITAL OBSERVATION PER HR: HCPCS

## 2017-06-08 PROCEDURE — 84443 ASSAY THYROID STIM HORMONE: CPT

## 2017-06-08 PROCEDURE — 700102 HCHG RX REV CODE 250 W/ 637 OVERRIDE(OP): Performed by: HOSPITALIST

## 2017-06-08 PROCEDURE — 99285 EMERGENCY DEPT VISIT HI MDM: CPT

## 2017-06-08 PROCEDURE — A9270 NON-COVERED ITEM OR SERVICE: HCPCS | Performed by: HOSPITALIST

## 2017-06-08 PROCEDURE — 85025 COMPLETE CBC W/AUTO DIFF WBC: CPT

## 2017-06-08 RX ORDER — ACYCLOVIR 400 MG/1
800 TABLET ORAL DAILY
COMMUNITY
End: 2017-10-23

## 2017-06-08 RX ORDER — PROMETHAZINE HYDROCHLORIDE 25 MG/1
12.5-25 TABLET ORAL EVERY 4 HOURS PRN
Status: DISCONTINUED | OUTPATIENT
Start: 2017-06-08 | End: 2017-06-09 | Stop reason: HOSPADM

## 2017-06-08 RX ORDER — ONDANSETRON 2 MG/ML
4 INJECTION INTRAMUSCULAR; INTRAVENOUS EVERY 4 HOURS PRN
Status: DISCONTINUED | OUTPATIENT
Start: 2017-06-08 | End: 2017-06-09 | Stop reason: HOSPADM

## 2017-06-08 RX ORDER — POLYETHYLENE GLYCOL 3350 17 G/17G
1 POWDER, FOR SOLUTION ORAL
Status: DISCONTINUED | OUTPATIENT
Start: 2017-06-08 | End: 2017-06-09 | Stop reason: HOSPADM

## 2017-06-08 RX ORDER — FLUTICASONE PROPIONATE 50 MCG
1 SPRAY, SUSPENSION (ML) NASAL 2 TIMES DAILY
Status: DISCONTINUED | OUTPATIENT
Start: 2017-06-08 | End: 2017-06-09 | Stop reason: HOSPADM

## 2017-06-08 RX ORDER — MONTELUKAST SODIUM 10 MG/1
10 TABLET ORAL
Status: DISCONTINUED | OUTPATIENT
Start: 2017-06-08 | End: 2017-06-09 | Stop reason: HOSPADM

## 2017-06-08 RX ORDER — ACETAMINOPHEN 325 MG/1
650 TABLET ORAL EVERY 6 HOURS PRN
Status: DISCONTINUED | OUTPATIENT
Start: 2017-06-08 | End: 2017-06-09 | Stop reason: HOSPADM

## 2017-06-08 RX ORDER — AMITRIPTYLINE HYDROCHLORIDE 25 MG/1
25 TABLET, FILM COATED ORAL NIGHTLY
COMMUNITY
End: 2017-11-17

## 2017-06-08 RX ORDER — MOMETASONE FUROATE 50 UG/1
2 SPRAY, METERED NASAL 2 TIMES DAILY
COMMUNITY
End: 2017-07-03 | Stop reason: SDUPTHER

## 2017-06-08 RX ORDER — PROMETHAZINE HYDROCHLORIDE 25 MG/1
12.5-25 SUPPOSITORY RECTAL EVERY 4 HOURS PRN
Status: DISCONTINUED | OUTPATIENT
Start: 2017-06-08 | End: 2017-06-09 | Stop reason: HOSPADM

## 2017-06-08 RX ORDER — TEMAZEPAM 15 MG/1
15 CAPSULE ORAL
Status: DISCONTINUED | OUTPATIENT
Start: 2017-06-08 | End: 2017-06-09 | Stop reason: HOSPADM

## 2017-06-08 RX ORDER — AMITRIPTYLINE HYDROCHLORIDE 25 MG/1
25 TABLET, FILM COATED ORAL NIGHTLY
Status: DISCONTINUED | OUTPATIENT
Start: 2017-06-08 | End: 2017-06-09 | Stop reason: HOSPADM

## 2017-06-08 RX ORDER — ACYCLOVIR 800 MG/1
800 TABLET ORAL DAILY
Status: DISCONTINUED | OUTPATIENT
Start: 2017-06-09 | End: 2017-06-09 | Stop reason: HOSPADM

## 2017-06-08 RX ORDER — BISACODYL 10 MG
10 SUPPOSITORY, RECTAL RECTAL
Status: DISCONTINUED | OUTPATIENT
Start: 2017-06-08 | End: 2017-06-09 | Stop reason: HOSPADM

## 2017-06-08 RX ORDER — AMOXICILLIN 250 MG
2 CAPSULE ORAL 2 TIMES DAILY
Status: DISCONTINUED | OUTPATIENT
Start: 2017-06-08 | End: 2017-06-09 | Stop reason: HOSPADM

## 2017-06-08 RX ORDER — LEVOTHYROXINE SODIUM 0.03 MG/1
25 TABLET ORAL
Status: DISCONTINUED | OUTPATIENT
Start: 2017-06-09 | End: 2017-06-09 | Stop reason: HOSPADM

## 2017-06-08 RX ORDER — ONDANSETRON 4 MG/1
4 TABLET, ORALLY DISINTEGRATING ORAL EVERY 4 HOURS PRN
Status: DISCONTINUED | OUTPATIENT
Start: 2017-06-08 | End: 2017-06-09 | Stop reason: HOSPADM

## 2017-06-08 RX ADMIN — MONTELUKAST SODIUM 10 MG: 10 TABLET, FILM COATED ORAL at 21:17

## 2017-06-08 RX ADMIN — AMITRIPTYLINE HYDROCHLORIDE 25 MG: 25 TABLET, FILM COATED ORAL at 22:53

## 2017-06-08 RX ADMIN — FLUTICASONE PROPIONATE 50 MCG: 50 SPRAY, METERED NASAL at 22:53

## 2017-06-08 ASSESSMENT — COPD QUESTIONNAIRES
HAVE YOU SMOKED AT LEAST 100 CIGARETTES IN YOUR ENTIRE LIFE: NO/DON'T KNOW
DO YOU EVER COUGH UP ANY MUCUS OR PHLEGM?: NO/ONLY WITH OCCASIONAL COLDS OR INFECTIONS
DURING THE PAST 4 WEEKS HOW MUCH DID YOU FEEL SHORT OF BREATH: NONE/LITTLE OF THE TIME
COPD SCREENING SCORE: 0

## 2017-06-08 ASSESSMENT — LIFESTYLE VARIABLES
TOTAL SCORE: 0
CONSUMPTION TOTAL: NEGATIVE
AVERAGE NUMBER OF DAYS PER WEEK YOU HAVE A DRINK CONTAINING ALCOHOL: 1
EVER HAD A DRINK FIRST THING IN THE MORNING TO STEADY YOUR NERVES TO GET RID OF A HANGOVER: NO
HOW MANY TIMES IN THE PAST YEAR HAVE YOU HAD 5 OR MORE DRINKS IN A DAY: 0
HAVE PEOPLE ANNOYED YOU BY CRITICIZING YOUR DRINKING: NO
TOTAL SCORE: 0
ON A TYPICAL DAY WHEN YOU DRINK ALCOHOL HOW MANY DRINKS DO YOU HAVE: 1
EVER_SMOKED: NEVER
ALCOHOL_USE: YES
HAVE YOU EVER FELT YOU SHOULD CUT DOWN ON YOUR DRINKING: NO
TOTAL SCORE: 0
EVER FELT BAD OR GUILTY ABOUT YOUR DRINKING: NO

## 2017-06-08 ASSESSMENT — PAIN SCALES - GENERAL
PAINLEVEL_OUTOF10: 0
PAINLEVEL_OUTOF10: 0

## 2017-06-08 NOTE — ED NOTES
Chief Complaint   Patient presents with   • Numbness     Pt BIB self to triage for c/o increased numbness/ pt reports being in ER w/bilat leg numbness on Sun/ pt reports increased numbness left arm last hour. pt denies slurred speech/ unilateral defecit.    Explained to pt triage process, made pt aware to tell this RN of any changes/concerns, pt verbalized understanding of process and instructions given. Pt to ER lobby.

## 2017-06-08 NOTE — ED PROVIDER NOTES
"ED Provider Note    CHIEF COMPLAINT  Chief Complaint   Patient presents with   • Numbness     Pt BIB self to triage for c/o increased numbness/ pt reports being in ER w/bilat leg numbness on Sun/ pt reports increased numbness left arm last hour. pt denies slurred speech/ unilateral defecit.        HPI  Ct Dasia Montalvo is a 46 y.o. female with a history of migraine headaches, thyroid disease, GERD, who presents with complaint of numbness and tingling to the lower extremities, and some numbness to the left arm today. The patient was seen in the emergency department on Sunday with complaints of progressive numbness to her lower extremities. She says the numbness started in her feet and has progressed into her knees and hips. She has been able to ambulate, denies any focal weakness, or any loss of bowel or bladder function. She said today she started developing some numbness to her left arm. She does have a migraine headaches, but denies any headache. She has not been ill with any fever, chills, sore throat, cough, vomiting, or diarrhea. The patient had lab work done on Sunday which was unremarkable. She was referred to neurology but has not name to follow-up. She saw her primary care physician who asked for some additional lab work. She talked to her sister who is a  neurology nurse, and a physical therapist both who recommended she needed a lumbar puncture.  The patient has been followed by Shelly ROJAS of neurology for her migraines.    REVIEW OF SYSTEMS  See HPI for further details. All other systems are negative.     PAST MEDICAL HISTORY  Past Medical History   Diagnosis Date   • Kidney disease      kidney infection as a child, once only, inpatient   • GERD (gastroesophageal reflux disease)      no improvement with omeprazole.  on prevacid   • Thyroid disease       NL. no treatments or procedures   • Migraine      since teens, menstrual.  SX=bad pain \"top of head\"  will get nausea, goes away with NSAIDS " or rel[pax       FAMILY HISTORY  Family History   Problem Relation Age of Onset   • Heart Disease Mother      chf   • Stroke Mother    • Cancer Mother      minor skin cancer   • Hyperlipidemia Brother    • Cancer Maternal Grandmother      brca-surv; gyn ca-surviv   • Diabetes Paternal Grandmother        SOCIAL HISTORY  Social History     Social History   • Marital Status:      Spouse Name: N/A   • Number of Children: N/A   • Years of Education: N/A     Social History Main Topics   • Smoking status: Never Smoker    • Smokeless tobacco: Never Used   • Alcohol Use: No      Comment: social   • Drug Use: No   • Sexual Activity:     Partners: Male      Comment: ; vasectomy     Other Topics Concern   • None     Social History Narrative       SURGICAL HISTORY  History reviewed. No pertinent past surgical history.    CURRENT MEDICATIONS  Home Medications     Reviewed by Kena Guevara R.N. (Registered Nurse) on 06/08/17 at 1256  Med List Status: Not Addressed    Medication Last Dose Status    acyclovir (ZOVIRAX) 400 MG tablet 6/8/2017 Active    amitriptyline (ELAVIL) 25 MG Tab 6/7/2017 Active    levothyroxine (SYNTHROID) 25 MCG Tab 6/8/2017 Active    mometasone (NASONEX) 50 MCG/ACT nasal spray PRN Active    montelukast (SINGULAIR) 10 MG Tab 6/7/2017 Active    pentosan (ELMIRON) 100 MG Cap 6/7/2017 Active    phenazopyridine (PYRIDIUM) 200 MG Tab prn Active    promethazine (PHENERGAN) 25 MG Tab prn Active    RELPAX 40 MG tablet prn Active    rizatriptan (MAXALT) 10 MG tablet 6/7/2017 Active    rizatriptan (MAXALT) 10 MG tablet  Active    valacyclovir (VALTREX) 500 MG Tab PRN Active    vitamin D (CHOLECALCIFEROL) 1000 UNIT Tab 6/8/2017 Active    zolmitriptan (ZOMIG) 5 MG nasal solution prn Active                ALLERGIES  Allergies   Allergen Reactions   • Sulfa Drugs Unspecified     Internal burning sensation       PHYSICAL EXAM  VITAL SIGNS: /78 mmHg  Pulse 98  Temp(Src) 37.6 °C (99.7 °F)  Resp 16  " Ht 1.626 m (5' 4.02\")  Wt 63.4 kg (139 lb 12.4 oz)  BMI 23.98 kg/m2  SpO2 100%  LMP  (Exact Date)  Constitutional: Awake, alert, in no acute distress, Non-toxic appearance.   HENT: Atraumatic. Bilateral external ears normal, mucous membranes moist, throat nonerythematous without exudates, nose is normal.  Eyes: PERRL, EOMI, conjunctiva moist, noninjected.  Neck: Nontender, Normal range of motion, No nuchal rigidity, No stridor.   Lymphatic: No lymphadenopathy noted.   Cardiovascular: Regular rate and rhythm, no murmurs, rubs, gallops.  Thorax & Lungs:  Good breath sounds bilaterally, no wheezes, rales, or retractions.  No chest tenderness.  Abdomen: Bowel sounds normal, Soft, nontender, nondistended, no rebound, guarding, masses.  Back: No CVA or spinal tenderness.  Extremities: Intact distal pulses, No edema, No tenderness.   Skin: Warm, Dry, No rashes.   Musculoskeletal: No joint swelling or tenderness.  Neurologic: Alert & oriented x 3, sensory and motor function normal. No focal deficits.   Psychiatric: Affect normal, Judgment normal, Mood normal.           RADIOLOGY/PROCEDURES  No orders to display         COURSE & MEDICAL DECISION MAKING  Pertinent Labs & Imaging studies reviewed. (See chart for details)  The patient presents via above complaints. She has had ascending numbness and tingling to her lower extremities for the past 4-5 days. Today she developed some numbness and tingling to the left upper extremity. She has had no weakness or loss of motor control. On examination she has +3 patellar reflexes, so I think the diagnosis of Guillame Quincy is unlikely at this time.  Multiple attempts were made to reach Dr. Shelley of neurology without success. Dr. Ardon of neurology was in the office at that the time, and I did consult with him. He felt the patient should be admitted to medicine, and have Dr. Shelley consult.  I offered to do the lumbar puncture, but he says that there are specific tests that need " to be ordered and these needed to be overseen by Dr. Shelley.  The patient's primary care physician had requested a repeat CBC and vitamin B-12 which were obtained, and are normal. Case was discussed with the [facility] for admission.      FINAL IMPRESSION  1. Paresthesias to the lower extremities  2. Paresthesias to the left upper extremity  3.         Electronically signed by: Rehan Smith, 6/8/2017 1:48 PM

## 2017-06-08 NOTE — IP AVS SNAPSHOT
" Home Care Instructions                                                                                                                  Name:Ct Montalvo  Medical Record Number:[id number]  CSN: [id number]    YOB: 1970   Age: 46 y.o.  Sex: female  HT:1.626 m (5' 4.02\") WT: 63 kg (138 lb 14.2 oz)          Admit Date: 6/8/2017     Discharge Date:   Today's Date: 6/9/2017  Attending Doctor:  No att. providers found                  Allergies:  Sulfa drugs            Discharge Instructions       Discharge Instructions    Discharged to home by car with relative. Discharged via walking, hospital escort: Refused.  Special equipment needed: Not Applicable    Be sure to schedule a follow-up appointment with your primary care doctor or any specialists as instructed.     Discharge Plan:   Diet Plan: Discussed (Regular)  Activity Level: Discussed (As tolerated)  Confirmed Follow up Appointment: Patient to Call and Schedule Appointment  Confirmed Symptoms Management: Discussed  Medication Reconciliation Updated: Yes  Influenza Vaccine Indication: Not indicated: Previously immunized this influenza season and > 8 years of age    I understand that a diet low in cholesterol, fat, and sodium is recommended for good health. Unless I have been given specific instructions below for another diet, I accept this instruction as my diet prescription.   Other diet: Regular    Special Instructions: None    · Is patient discharged on Warfarin / Coumadin?   No     · Is patient Post Blood Transfusion?  No    Depression / Suicide Risk    As you are discharged from this [facility] facility, it is important to learn how to keep safe from harming yourself.    Recognize the warning signs:  · Abrupt changes in personality, positive or negative- including increase in energy   · Giving away possessions  · Change in eating patterns- significant weight changes-  positive or negative  · Change in sleeping patterns- unable to sleep or " sleeping all the time   · Unwillingness or inability to communicate  · Depression  · Unusual sadness, discouragement and loneliness  · Talk of wanting to die  · Neglect of personal appearance   · Rebelliousness- reckless behavior  · Withdrawal from people/activities they love  · Confusion- inability to concentrate     If you or a loved one observes any of these behaviors or has concerns about self-harm, here's what you can do:  · Talk about it- your feelings and reasons for harming yourself  · Remove any means that you might use to hurt yourself (examples: pills, rope, extension cords, firearm)  · Get professional help from the community (Mental Health, Substance Abuse, psychological counseling)  · Do not be alone:Call your Safe Contact- someone whom you trust who will be there for you.  · Call your local CRISIS HOTLINE 088-0046 or 472-381-6126  · Call your local Children's Mobile Crisis Response Team [place] (941) 611-8550 or www.[employer]  · Call the toll free National Suicide Prevention Hotlines   · National Suicide Prevention Lifeline 567-111-MEXQ (2553)  · National Hope Line Network 800-SUICIDE (321-8380)        Your appointments     Jun 13, 2017  1:00 PM   Follow Up Visit with [facility]   [facility] (--)    [address], [facility] [address]  [facility] [address] [phone]           You will be receiving a confirmation call a few days before your appointment from our automated call confirmation system.            Sep 27, 2017  3:00 PM   New Patient with Jean Marie Shelley M.D.   [facility] (--)    [address], [address]  [facility] [id number] [phone]           Please bring Photo ID, Insurance Cards, All Medication Bottles and copies of any legal documents (such as Living Will, Power of ) If speaking a language besides English please bring an adult . Please arrive 30 minutes prior for check in and registration. You will be  receiving a confirmation call a few days before your appointment from our automated call confirmation system.              Follow-up Information     1. Follow up with Neurology.         Discharge Medication Instructions:    Below are the medications your physician expects you to take upon discharge:    Review all your home medications and newly ordered medications with your doctor and/or pharmacist. Follow medication instructions as directed by your doctor and/or pharmacist.    Please keep your medication list with you and share with your physician.               Medication List      CONTINUE taking these medications        Instructions    Morning Afternoon Evening Bedtime    acyclovir 400 MG tablet   Commonly known as:  ZOVIRAX        Take 800 mg by mouth every day.   Dose:  800 mg                        amitriptyline 25 MG Tabs   Last time this was given:  25 mg on 6/8/2017 10:53 PM   Commonly known as:  ELAVIL        Take 25 mg by mouth every evening.   Dose:  25 mg                        levothyroxine 25 MCG Tabs   Last time this was given:  25 mcg on 6/9/2017  6:05 AM   Commonly known as:  SYNTHROID        Take 1 Tab by mouth Every morning on an empty stomach.   Dose:  25 mcg                        montelukast 10 MG Tabs   Last time this was given:  10 mg on 6/8/2017  9:17 PM   Commonly known as:  SINGULAIR        Take 1 Tab by mouth every day.   Dose:  10 mg                        NASONEX 50 MCG/ACT nasal spray   Generic drug:  mometasone        Spray 2 Sprays in nose 2 Times a Day.   Dose:  2 Spray                        pentosan 100 MG Caps   Commonly known as:  ELMIRON        Take 300 mg by mouth every day. At midnight.  Indications: Bladder Wall Inflammation   Dose:  300 mg                        RELPAX 40 MG tablet   Generic drug:  eletriptan        Take 40 mg by mouth Once PRN.   Dose:  40 mg                        rizatriptan 10 MG tablet   Commonly known as:  MAXALT        Take 10 mg by mouth Once PRN  for Migraine.   Dose:  10 mg                        ZOMIG 2.5 MG Soln   Last time this was given:  5 mg on 6/9/2017  8:45 AM   Generic drug:  ZOLMitriptan        Spray 1 Spray in nose 1 time daily as needed.   Dose:  1 Spray                                Instructions           Diet / Nutrition:    Follow any diet instructions given to you by your doctor or the dietician, including how much salt (sodium) you are allowed each day.    If you are overweight, talk to your doctor about a weight reduction plan.    Activity:    Remain physically active following your doctor's instructions about exercise and activity.    Rest often.     Any time you become even a little tired or short of breath, SIT DOWN and rest.    Worsening Symptoms:    Report any of the following signs and symptoms to the doctor's office immediately:    *Pain of jaw, arm, or neck  *Chest pain not relieved by medication                               *Dizziness or loss of consciousness  *Difficulty breathing even when at rest   *More tired than usual                                       *Bleeding drainage or swelling of surgical site  *Swelling of feet, ankles, legs or stomach                 *Fever (>100ºF)  *Pink or blood tinged sputum  *Weight gain (3lbs/day or 5lbs /week)           *Shock from internal defibrillator (if applicable)  *Palpitations or irregular heartbeats                *Cool and/or numb extremities    Stroke Awareness    Common Risk Factors for Stroke include:    Age  Atrial Fibrillation  Carotid Artery Stenosis  Diabetes Mellitus  Excessive alcohol consumption  High blood pressure  Overweight   Physical inactivity  Smoking    Warning signs and symptoms of a stroke include:    *Sudden numbness or weakness of the face, arm or leg (especially on one side of the body).  *Sudden confusion, trouble speaking or understanding.  *Sudden trouble seeing in one or both eyes.  *Sudden trouble walking, dizziness, loss of balance or  coordination.Sudden severe headache with no known cause.    It is very important to get treatment quickly when a stroke occurs. If you experience any of the above warning signs, call 911 immediately.                   Disclaimer         Quit Smoking / Tobacco Use:    I understand the use of any tobacco products increases my chance of suffering from future heart disease or stroke and could cause other illnesses which may shorten my life. Quitting the use of tobacco products is the single most important thing I can do to improve my health. For further information on smoking / tobacco cessation call a Toll Free Quit Line at 1-825.572.6319 (*National Cancer [place]) or 1-956.380.9464 (American Lung Association) or you can access the web based program at www.lungRETC.org.    [place] Tobacco Users Help Line:  (658) 631-2696       Toll Free: 1-279.488.8854  Quit Tobacco Program [facility] (965)640-0411    Crisis Hotline:    [place] Crisis Hotline:  9-187-KZWVDAD or 1-796.289.9868    [place] Crisis Hotline:    1-648.206.9083 or 346-124-6450    Discharge Survey:   Thank you for choosing [facility]. We hope we did everything we could to make your hospital stay a pleasant one. You may be receiving a phone survey and we would appreciate your time and participation in answering the questions. Your input is very valuable to us in our efforts to improve our service to our patients and their families.        My signature on this form indicates that:    1. I have reviewed and understand the above information.  2. My questions regarding this information have been answered to my satisfaction.  3. I have formulated a plan with my discharge nurse to obtain my prescribed medications for home.                  Disclaimer         __________________________________                     __________       ________                       Patient Signature                                                 Date                     Time

## 2017-06-09 ENCOUNTER — APPOINTMENT (OUTPATIENT)
Dept: RADIOLOGY | Facility: MEDICAL CENTER | Age: 47
End: 2017-06-09
Attending: NURSE PRACTITIONER
Payer: COMMERCIAL

## 2017-06-09 VITALS
DIASTOLIC BLOOD PRESSURE: 59 MMHG | OXYGEN SATURATION: 94 % | HEIGHT: 64 IN | SYSTOLIC BLOOD PRESSURE: 107 MMHG | WEIGHT: 138.89 LBS | BODY MASS INDEX: 23.71 KG/M2 | TEMPERATURE: 98.5 F | RESPIRATION RATE: 18 BRPM | HEART RATE: 89 BPM

## 2017-06-09 PROCEDURE — 96375 TX/PRO/DX INJ NEW DRUG ADDON: CPT

## 2017-06-09 PROCEDURE — 700111 HCHG RX REV CODE 636 W/ 250 OVERRIDE (IP): Performed by: NURSE PRACTITIONER

## 2017-06-09 PROCEDURE — 700111 HCHG RX REV CODE 636 W/ 250 OVERRIDE (IP): Performed by: HOSPITALIST

## 2017-06-09 PROCEDURE — G0378 HOSPITAL OBSERVATION PER HR: HCPCS

## 2017-06-09 PROCEDURE — A9270 NON-COVERED ITEM OR SERVICE: HCPCS | Performed by: INTERNAL MEDICINE

## 2017-06-09 PROCEDURE — 700102 HCHG RX REV CODE 250 W/ 637 OVERRIDE(OP): Performed by: INTERNAL MEDICINE

## 2017-06-09 PROCEDURE — 96374 THER/PROPH/DIAG INJ IV PUSH: CPT

## 2017-06-09 PROCEDURE — 99217 PR OBSERVATION CARE DISCHARGE: CPT | Performed by: INTERNAL MEDICINE

## 2017-06-09 PROCEDURE — 700102 HCHG RX REV CODE 250 W/ 637 OVERRIDE(OP): Performed by: HOSPITALIST

## 2017-06-09 PROCEDURE — 700117 HCHG RX CONTRAST REV CODE 255: Performed by: NURSE PRACTITIONER

## 2017-06-09 PROCEDURE — 72156 MRI NECK SPINE W/O & W/DYE: CPT

## 2017-06-09 PROCEDURE — A9579 GAD-BASE MR CONTRAST NOS,1ML: HCPCS | Performed by: NURSE PRACTITIONER

## 2017-06-09 PROCEDURE — 96376 TX/PRO/DX INJ SAME DRUG ADON: CPT

## 2017-06-09 PROCEDURE — A9270 NON-COVERED ITEM OR SERVICE: HCPCS | Performed by: HOSPITALIST

## 2017-06-09 PROCEDURE — 70552 MRI BRAIN STEM W/DYE: CPT

## 2017-06-09 RX ORDER — RIZATRIPTAN BENZOATE 10 MG/1
10 TABLET ORAL
COMMUNITY
End: 2018-04-23

## 2017-06-09 RX ORDER — ZOLMITRIPTAN 2.5 MG/1
5 SPRAY, METERED NASAL
Status: DISCONTINUED | OUTPATIENT
Start: 2017-06-09 | End: 2017-06-09 | Stop reason: HOSPADM

## 2017-06-09 RX ORDER — SUMATRIPTAN 25 MG/1
25 TABLET, FILM COATED ORAL
Status: COMPLETED | OUTPATIENT
Start: 2017-06-09 | End: 2017-06-09

## 2017-06-09 RX ORDER — ZOLMITRIPTAN 2.5 MG/1
1 SPRAY, METERED NASAL
COMMUNITY
End: 2017-10-23

## 2017-06-09 RX ORDER — ELETRIPTAN HYDROBROMIDE 40 MG/1
40 TABLET, FILM COATED ORAL
COMMUNITY
End: 2017-10-23

## 2017-06-09 RX ORDER — DIPHENHYDRAMINE HYDROCHLORIDE 50 MG/ML
12.5 INJECTION INTRAMUSCULAR; INTRAVENOUS ONCE
Status: COMPLETED | OUTPATIENT
Start: 2017-06-09 | End: 2017-06-09

## 2017-06-09 RX ADMIN — PROCHLORPERAZINE EDISYLATE 10 MG: 5 INJECTION INTRAMUSCULAR; INTRAVENOUS at 11:19

## 2017-06-09 RX ADMIN — GADODIAMIDE 15 ML: 287 INJECTION INTRAVENOUS at 13:04

## 2017-06-09 RX ADMIN — ONDANSETRON 4 MG: 2 INJECTION INTRAMUSCULAR; INTRAVENOUS at 06:08

## 2017-06-09 RX ADMIN — ONDANSETRON 4 MG: 2 INJECTION INTRAMUSCULAR; INTRAVENOUS at 10:39

## 2017-06-09 RX ADMIN — DIPHENHYDRAMINE HYDROCHLORIDE 12.5 MG: 50 INJECTION, SOLUTION INTRAMUSCULAR; INTRAVENOUS at 11:19

## 2017-06-09 RX ADMIN — SUMATRIPTAN SUCCINATE 25 MG: 25 TABLET, FILM COATED ORAL at 08:03

## 2017-06-09 RX ADMIN — ZOLMITRIPTAN 5 MG: 2.5 SPRAY, METERED NASAL at 08:45

## 2017-06-09 RX ADMIN — LEVOTHYROXINE SODIUM 25 MCG: 25 TABLET ORAL at 06:05

## 2017-06-09 RX ADMIN — SUMATRIPTAN SUCCINATE 25 MG: 25 TABLET, FILM COATED ORAL at 05:50

## 2017-06-09 RX ADMIN — ONDANSETRON 4 MG: 4 TABLET, ORALLY DISINTEGRATING ORAL at 08:27

## 2017-06-09 ASSESSMENT — PAIN SCALES - GENERAL
PAINLEVEL_OUTOF10: 0
PAINLEVEL_OUTOF10: 5
PAINLEVEL_OUTOF10: 0
PAINLEVEL_OUTOF10: 6

## 2017-06-09 NOTE — H&P
CHIEF COMPLAINT:  Sensation changes.    HISTORY OF PRESENT ILLNESS:  This is a 46-year-old female who is relatively   healthy.  She reports that she does have frequent migraines, which are usually   centered around her right shoulder, into her neck and the top of her head.    She has had these for many years.  In the recent few weeks, she has developed   some other symptoms which are concerning to her.  She notes tingling, which   initially began in both feet, and was followed by the development of what she   describes as a deep numb feeling in both legs, which extends in a stocking   pattern all the way up to her belly button.  In the last 48 hours, she   developed tingling in her left fingertips, and this was developed further into   another sense of deep numbness extending in the left arm up to the shoulder,   similar to what she has experienced in her legs.  She does not notice any   weakness that she is aware of, and she has not had any problems with her   vision.  She does report that when she gets very fatigued, she will   occasionally have problems with word finding; however, this has been chronic   for her for many years.  She has not had any problems going to the bathroom   nor does she have any problems with her gait.    REVIEW OF SYSTEMS:  Positive as noted above, otherwise all systems are   reviewed and negative.    PREVIOUS MEDICAL HISTORY:  1.  Migraine.  2.  Hypothyroidism.  3.  GERD.  4.  Interstitial cystitis.    MEDICATIONS:  1.  Acyclovir 400 mg p.o. daily.  2.  Amitriptyline 25 mg at bedtime.  3.  Synthroid 25 mcg in the a.m.  4.  Nasonex.  5.  Montelukast 10 mg p.o. daily.  6.  Elmiron 100 mg p.o. daily.    ALLERGIES:  TO SULFA.    SOCIAL HISTORY:  The patient is a lifelong nonsmoker.  She drinks alcohol   rarely.  She is employed as an  in 7th grade.    SURGICAL HISTORY:  Denies.    FAMILY HISTORY:  Reviewed, but not relevant.    PHYSICAL EXAMINATION:  VITAL SIGNS:  Temperature  37.6, heart rate 98, respiratory rate 16, /78,   satting in the mid 90s.  GENERAL:  The patient is awake and alert.  She is in no acute distress.  HEENT:  Head is normocephalic and atraumatic.  Mucous membranes are moist.    Sclerae and conjunctivae are benign.  NECK:  Trachea is in the midline.  Neck is supple.  There is no JVD or bruits.    No meningeal findings.  RESPIRATORY:  Clear to auscultation bilaterally.  CARDIAC:  Regular rate and rhythm.  No murmurs, rubs or clicks.  ABDOMEN:  Soft, no guarding, rebound, hepatosplenomegaly, or masses.    Nontender to palpation.  SKIN:  Warm, dry, no rashes are appreciated.  EXTREMITIES:  No clubbing, cyanosis or edema.  Calves nontender to palpation.  NEUROLOGIC:  Gait is normal.  There is increased arc with Romberg, but patient   does not lose her balance.  Finger-to-nose and heel-to-shin are normal.    Strength is 5/5 throughout.  Reflexes are brisk and symmetric throughout.    Sensation is intact throughout.  Cranial nerves II-XII are intact.  Sensation   is intact to light touch; however, subjectively reduced in both lower   extremities from the toes up to the hip.    ASSESSMENT AND PLAN:  This is a 46-year-old female with problem list as   follows:    Diffuse neurologic complaints.  An initial MRI done tonight noncontrast   demonstrates questionable findings, which could be consistent with multiple   sclerosis.  I have discussed case tonight with Dr. Jean Marie Shelley who   recommends repeating the MRI of the head with contrast in addition getting MRI   of the C-spine with contrast.  We will admit the patient overnight to get   these studies done and then review with neurology in the a.m.  Patient does   have a family history that is significant for multiple sclerosis in her   mother.       ____________________________________     DO MIRIAM Sotelo / LEONOR    DD:  06/08/2017 23:00:51  DT:  06/08/2017 23:17:45    D#:  [id number]  Job#:   [id number]    cc: ELIZABETH LEE

## 2017-06-09 NOTE — PROGRESS NOTES
Pt resting calmly, with eyes closed, sleeping on and off, is stable with arousal. Pt denies pain or needs at this time. Call light and personal possessions within reach, will continue to monitor.

## 2017-06-09 NOTE — PROGRESS NOTES
Report received, assumed patient care.  Pt A&OX4.  Family at bedside.  Assessment completed.  Call light within reach, personal belongings available, bed in lowest position, pt calling for assistance.  Pt reports constant pain w/ nausea, see MAR.  Communication board updated, POC discussed.  Monitors reapplied, VSS.  No additional needs at this time.

## 2017-06-09 NOTE — DISCHARGE SUMMARY
CHIEF COMPLAINT ON ADMISSION  Chief Complaint   Patient presents with   • Numbness     Pt BIB self to triage for c/o increased numbness/ pt reports being in ER w/bilat leg numbness on Sun/ pt reports increased numbness left arm last hour. pt denies slurred speech/ unilateral defecit.        CODE STATUS  Full Code    HPI & HOSPITAL COURSE  This is a 46 y.o. female here with history of migraine, GERD, hypothyroid who noted some numbness and tingling in her. She went to her PCP and physical therapist but as the symptoms did not resolve came to the ED. Her mother has MS and she was concerned given family history. MRI of the brain showed questionable findings. The case was discussed with Dr. Shelley who recommended MRIs of the brain and cervical spine with contrast which was done. These were unremarkable and Dr. Shelley was updated who recommended outpatient follow up for MS work up in his clinic. I have made her an appointment for next week with her Neuro APN and she will also see Karsten in September or sooner. She asked about an LP however this was not indicated by Neurology and she has no symptoms of meningitis and aside from numbness no other neurological deficits of signs of infections. Will defer this to the outpatient setting. She is stable for home.     Therefore, she is discharged in good and stable condition with close outpatient follow-up.    SPECIFIC OUTPATIENT FOLLOW-UP  Dr. Shelley for new patient f/u September  Shelly Kent [facility] for follow up 6/13/17  PCP 1-2 weeks     DISCHARGE PROBLEM LIST  Active Problems:    Paresthesia of both legs POA: Unknown    Paresthesias in left hand POA: Unknown  Resolved Problems:    * No resolved hospital problems. *      FOLLOW UP  Future Appointments  Date Time Provider Department Center   6/13/2017 1:00 PM [facility] None   9/27/2017 3:00 PM Jean Marie Shelley M.D. [facility] None     No follow-up provider specified.    MEDICATIONS ON DISCHARGE   Katia  Ct Forman   Home Medication Instructions NITISH:[id number]    Printed on:06/09/17 7538   Medication Information                      acyclovir (ZOVIRAX) 400 MG tablet  Take 800 mg by mouth every day.             amitriptyline (ELAVIL) 25 MG Tab  Take 25 mg by mouth every evening.             eletriptan (RELPAX) 40 MG tablet  Take 40 mg by mouth Once PRN.             levothyroxine (SYNTHROID) 25 MCG Tab  Take 1 Tab by mouth Every morning on an empty stomach.             mometasone (NASONEX) 50 MCG/ACT nasal spray  Spray 2 Sprays in nose 2 Times a Day.             montelukast (SINGULAIR) 10 MG Tab  Take 1 Tab by mouth every day.             pentosan (ELMIRON) 100 MG Cap  Take 300 mg by mouth every day. At midnight.  Indications: Bladder Wall Inflammation             rizatriptan (MAXALT) 10 MG tablet  Take 10 mg by mouth Once PRN for Migraine.             ZOLMitriptan (ZOMIG) 2.5 MG Solution  Spray 1 Spray in nose 1 time daily as needed.                 DIET  Orders Placed This Encounter   Procedures   • Diet Order     Standing Status: Standing      Number of Occurrences: 1      Standing Expiration Date:      Order Specific Question:  Diet:     Answer:  Regular [1]       ACTIVITY  As tolerated.  Weight bearing as tolerated      CONSULTATIONS  Discussed case with Neurology    PROCEDURES  None     LABORATORY  Lab Results   Component Value Date/Time    SODIUM 137 06/04/2017 01:15 PM    POTASSIUM 3.7 06/04/2017 01:15 PM    CHLORIDE 107 06/04/2017 01:15 PM    CO2 23 06/04/2017 01:15 PM    GLUCOSE 94 06/04/2017 01:15 PM    BUN 9 06/04/2017 01:15 PM    CREATININE 0.64 06/04/2017 01:15 PM        Lab Results   Component Value Date/Time    WBC 6.6 06/08/2017 01:50 PM    HEMOGLOBIN 13.3 06/08/2017 01:50 PM    HEMATOCRIT 39.5 06/08/2017 01:50 PM    PLATELET COUNT 354 06/08/2017 01:50 PM        Total time of the discharge process exceeds 36 minutes

## 2017-06-09 NOTE — DISCHARGE PLANNING
Care Transition Team Assessment    Information Source  Orientation : Oriented x 4  Information Given By: Patient  Who is responsible for making decisions for patient? : Patient    Readmission Evaluation  Is this a readmission?: No    Elopement Risk  Legal Hold: No  Ambulatory or Self Mobile in Wheelchair: No-Not an Elopement Risk    Interdisciplinary Discharge Planning  Does Admitting Nurse Feel This Could be a Complex Discharge?:  (No. Spouse, home, insurance, job, no needs anticipated.)  Primary Care Physician: THUY Dickinson  Lives with - Patient's Self Care Capacity: Spouse  Support Systems: Family Member(s)  Do You Take your Prescribed Medications Regularly: Yes  Assistance Needed: No              Finances  Financial Barriers to Discharge: No              Advance Directive  Advance Directive?: None  Advance Directive offered?:  (given packet)    Domestic Abuse  Have you ever been the victim of abuse or violence?: No         Discharge Risks or Barriers  Discharge risks or barriers?: No

## 2017-06-09 NOTE — PROGRESS NOTES
Pt up to RR, returned to room, denies pain, dizziness, or further needs at this time. Call light and personal possessions within reach, will continue to monitor.

## 2017-06-09 NOTE — PROGRESS NOTES
Dr Guillen updated r/g pt's c/o migraine, pt requesting home medications, order received for Zomig nasal spray, Imitrex, see MAR.

## 2017-06-09 NOTE — DISCHARGE INSTRUCTIONS
Discharge Instructions    Discharged to home by car with relative. Discharged via walking, hospital escort: Refused.  Special equipment needed: Not Applicable    Be sure to schedule a follow-up appointment with your primary care doctor or any specialists as instructed.     Discharge Plan:   Diet Plan: Discussed (Regular)  Activity Level: Discussed (As tolerated)  Confirmed Follow up Appointment: Patient to Call and Schedule Appointment  Confirmed Symptoms Management: Discussed  Medication Reconciliation Updated: Yes  Influenza Vaccine Indication: Not indicated: Previously immunized this influenza season and > 8 years of age    I understand that a diet low in cholesterol, fat, and sodium is recommended for good health. Unless I have been given specific instructions below for another diet, I accept this instruction as my diet prescription.   Other diet: Regular    Special Instructions: None    · Is patient discharged on Warfarin / Coumadin?   No     · Is patient Post Blood Transfusion?  No    Depression / Suicide Risk    As you are discharged from this [facility] facility, it is important to learn how to keep safe from harming yourself.    Recognize the warning signs:  · Abrupt changes in personality, positive or negative- including increase in energy   · Giving away possessions  · Change in eating patterns- significant weight changes-  positive or negative  · Change in sleeping patterns- unable to sleep or sleeping all the time   · Unwillingness or inability to communicate  · Depression  · Unusual sadness, discouragement and loneliness  · Talk of wanting to die  · Neglect of personal appearance   · Rebelliousness- reckless behavior  · Withdrawal from people/activities they love  · Confusion- inability to concentrate     If you or a loved one observes any of these behaviors or has concerns about self-harm, here's what you can do:  · Talk about it- your feelings and reasons for harming yourself  · Remove any means  that you might use to hurt yourself (examples: pills, rope, extension cords, firearm)  · Get professional help from the community (Mental Health, Substance Abuse, psychological counseling)  · Do not be alone:Call your Safe Contact- someone whom you trust who will be there for you.  · Call your local CRISIS HOTLINE 351-2108 or 505-616-8216  · Call your local Children's Mobile Crisis Response Team [place] (529) 967-5339 or www.[employer]  · Call the toll free National Suicide Prevention Hotlines   · National Suicide Prevention Lifeline 107-074-WMIR (9950)  · National Hope Line Network 800-SUICIDE (757-9630)

## 2017-06-09 NOTE — PROGRESS NOTES
Pt arrived to floor via gurney, ambulated to bed, with initial c/o numbness to lower extremities. Oriented pt to room, bed, call light, floor. POC discussed, pt verbalized understanding and agreement. A&Ox4, pt's personal belongings and call light within reach.

## 2017-06-09 NOTE — PROGRESS NOTES
Pharmacy called r/g pt's home medication Zomig, notified per pharmacy that we do not carry medication.

## 2017-06-09 NOTE — PROGRESS NOTES
CDU eileen Cheatham in to check pts. BP pt. Had low BP while laying flat and on her side,  86/43 and 85/54.  Pt. Denies lightheaded or dizziness, reports just tired.  BP rechecked while pt. Sitting up 103/60 still denies dizziness.  Primary RN notified.

## 2017-06-09 NOTE — PROGRESS NOTES
Pt discharged to home. IV d/c'd. Pt and family understand written and verbal d/c instructions.  No new rx's.  Regular diet, activity as tolerated.  Pt to follow up with PCP.  Pt instructed not drive after taking any narcotics.  Pt verbalized understanding.

## 2017-06-12 ENCOUNTER — TELEPHONE (OUTPATIENT)
Dept: NEUROLOGY | Facility: MEDICAL CENTER | Age: 47
End: 2017-06-12

## 2017-06-12 NOTE — TELEPHONE ENCOUNTER
Dr. Shelley, we received a call from this patient regarding her ER visit recently. She stated  In her message that you had spoken to her about giving her a RX for prednisone, but I find no mention of it. Please advise. Thanks!  - CR

## 2017-06-13 ENCOUNTER — OFFICE VISIT (OUTPATIENT)
Dept: NEUROLOGY | Facility: MEDICAL CENTER | Age: 47
End: 2017-06-13
Payer: COMMERCIAL

## 2017-06-13 VITALS
BODY MASS INDEX: 23.56 KG/M2 | WEIGHT: 138 LBS | TEMPERATURE: 97 F | SYSTOLIC BLOOD PRESSURE: 104 MMHG | DIASTOLIC BLOOD PRESSURE: 68 MMHG | RESPIRATION RATE: 16 BRPM | HEIGHT: 64 IN | HEART RATE: 98 BPM | OXYGEN SATURATION: 96 %

## 2017-06-13 DIAGNOSIS — R20.2 PARESTHESIAS IN LEFT HAND: ICD-10-CM

## 2017-06-13 DIAGNOSIS — R20.2 PARESTHESIA OF BOTH LEGS: ICD-10-CM

## 2017-06-13 PROCEDURE — 99215 OFFICE O/P EST HI 40 MIN: CPT | Performed by: PSYCHIATRY & NEUROLOGY

## 2017-06-13 ASSESSMENT — ENCOUNTER SYMPTOMS
DEPRESSION: 0
LOSS OF CONSCIOUSNESS: 0
FALLS: 0
MEMORY LOSS: 0
DOUBLE VISION: 0
BLURRED VISION: 0
DIZZINESS: 0
FOCAL WEAKNESS: 0
TINGLING: 1
SENSORY CHANGE: 1
HEADACHES: 1
DIARRHEA: 0

## 2017-06-13 NOTE — PROGRESS NOTES
"Cc:  Bilateral leg numbness    Known patient of mine for migraines here today for hospital followup.  Pt presented to ER recently and Dr. Shelley was consulted - per discharge summary MRI brain and spinal column was ordered due to concern for MS.  This was reported as normal with minimal spondylitic changes.  ER doctor said Dr. Shelley requested pt have \"urgent followup\" but unfortunately his first available is September.  They were scheduled with me for followup in the meantime.    I explained to the patient that MS is outside of my wheelhouse and she understood.  I spoke with her about the symptoms and she reports constant LE bilateral leg numbness such that she feels unsteady on her feet when she uses stairs etc.  She occasionally has symptoms in her left hand but not constant and only involving the three middle fingers.  She denies change to vision, shortness or breath or weakness.    Labs reviewed and no evidence of DM.  B12 and TSH also normal.    I offered to cancel patients appointment but she preferred to have me review chart and explain test results from hospital, which I was happy to do.     I also ordered an EMG which will be scheduled for completion, hopefully before her appointment with Dr. Shelley as it might offer some insight as to whether she has peripheral neuropathy and possibly something else affecting her left hand, such as carpal tunnel.    Total time with this visit:  30   Minutes face-to-face with patient. More than 50% of this visit was spent educating patient on their illness and/or coordinating care, as detailed above  "

## 2017-06-13 NOTE — MR AVS SNAPSHOT
"        Ct Montalvo   2017 1:00 PM   Office Visit   MRN: [id number]    Department:  [facility]   Dept Phone:  [phone]    Description:  Female : 1970   Provider[facility]  [facility]           Reason for Visit     Follow-Up Migraine       Allergies as of 2017     Allergen Noted Reactions    Sulfa Drugs 2016   Unspecified    Internal burning sensation      You were diagnosed with     Paresthesia of both legs   [872662]       Paresthesias in left hand   [751370]         Vital Signs     Blood Pressure Pulse Temperature Respirations Height Weight    104/68 mmHg 98 36.1 °C (97 °F) 16 1.626 m (5' 4\") 62.596 kg (138 lb)    Body Mass Index Oxygen Saturation Last Menstrual Period Smoking Status          23.68 kg/m2 96% (Exact Date) Never Smoker         Basic Information     Date Of Birth Sex Race Ethnicity Preferred Language    1970 Female White Non- English      Your appointments     Sep 27, 2017  3:00 PM   New Patient with Jean Marie Shelley M.D.   [facility] (--)    [address], [address]  [facility] [id number] [phone]           Please bring Photo ID, Insurance Cards, All Medication Bottles and copies of any legal documents (such as Living Will, Power of ) If speaking a language besides English please bring an adult . Please arrive 30 minutes prior for check in and registration. You will be receiving a confirmation call a few days before your appointment from our automated call confirmation system.              Problem List              ICD-10-CM Priority Class Noted - Resolved    Chronic migraine without aura without status migrainosus, not intractable G43.709   2016 - Present    IC (interstitial cystitis) N30.10   2016 - Present    Gastroesophageal reflux disease without esophagitis K21.9   2016 - Present    Hypothyroidism due to acquired atrophy of thyroid E03.4   2016 - Present    Herpes labialis " B00.1   6/15/2016 - Present    Paresthesia of both legs R20.2   6/8/2017 - Present    Paresthesias in left hand R20.2   6/8/2017 - Present      Health Maintenance        Date Due Completion Dates    IMM DTaP/Tdap/Td Vaccine (1 - Tdap) 7/26/1989 ---    MAMMOGRAM 11/1/2017 11/1/2016, 10/1/2015 (Done)    Override on 10/1/2015: Done (NL)    PAP SMEAR 10/1/2018 10/1/2015 (Done)    Override on 10/1/2015: Done (mirian Au)            Current Immunizations     Influenza Vaccine Adult HD 11/1/2016      Below and/or attached are the medications your provider expects you to take. Review all of your home medications and newly ordered medications with your provider and/or pharmacist. Follow medication instructions as directed by your provider and/or pharmacist. Please keep your medication list with you and share with your provider. Update the information when medications are discontinued, doses are changed, or new medications (including over-the-counter products) are added; and carry medication information at all times in the event of emergency situations     Allergies:  SULFA DRUGS - Unspecified               Medications  Valid as of: June 13, 2017 -  1:32 PM    Generic Name Brand Name Tablet Size Instructions for use    Acyclovir (Tab) ZOVIRAX 400 MG Take 800 mg by mouth every day.        Amitriptyline HCl (Tab) ELAVIL 25 MG Take 25 mg by mouth every evening.        Eletriptan Hydrobromide (Tab) RELPAX 40 MG Take 40 mg by mouth Once PRN.        Levothyroxine Sodium (Tab) SYNTHROID 25 MCG Take 1 Tab by mouth Every morning on an empty stomach.        Mometasone Furoate (Suspension) NASONEX 50 MCG/ACT Spray 2 Sprays in nose 2 Times a Day.        Montelukast Sodium (Tab) SINGULAIR 10 MG Take 1 Tab by mouth every day.        Pentosan Polysulfate Sodium (Cap) ELMIRON 100 MG Take 300 mg by mouth every day. At midnight.  Indications: Bladder Wall Inflammation        PredniSONE   Take  by mouth.        Rizatriptan Benzoate (Tab)  MAXALT 10 MG Take 10 mg by mouth Once PRN for Migraine.        ZOLMitriptan (Solution) ZOLMitriptan 2.5 MG Spray 1 Spray in nose 1 time daily as needed.        .                 Medicines prescribed today were sent to:     [facility] #[id number] - [place], [place] - [address] [place] [address]    [address] [place] [address] [place] [address]    Phone: [phone] Fax: [phone]    Open 24 Hours?: No      Medication refill instructions:       If your prescription bottle indicates you have medication refills left, it is not necessary to call your provider’s office. Please contact your pharmacy and they will refill your medication.    If your prescription bottle indicates you do not have any refills left, you may request refills at any time through one of the following ways: The online Scribz system (except Urgent Care), by calling your provider’s office, or by asking your pharmacy to contact your provider’s office with a refill request. Medication refills are processed only during regular business hours and may not be available until the next business day. Your provider may request additional information or to have a follow-up visit with you prior to refilling your medication.   *Please Note: Medication refills are assigned a new Rx number when refilled electronically. Your pharmacy may indicate that no refills were authorized even though a new prescription for the same medication is available at the pharmacy. Please request the medicine by name with the pharmacy before contacting your provider for a refill.        Referral     A referral request has been sent to our patient care coordination department. Please allow 3-5 business days for us to process this request and contact you either by phone or mail. If you do not hear from us by the 5th business day, please call us at [phone].           Scribz Access Code: Activation code not generated  Current Scribz Status: Active

## 2017-06-13 NOTE — MR AVS SNAPSHOT
Ct Montalvo   2017 1:40 PM   Office Visit   MRN: [id number]    Department:  [facility]   Dept Phone:  [phone]    Description:  Female : 1970   Provider:  Jean Marie Shelley M.D.           Reason for Visit     New Patient Bilateral paresthesia of legs, xsince [id number].      Allergies as of 2017     Allergen Noted Reactions    Sulfa Drugs 2016   Unspecified    Internal burning sensation      You were diagnosed with     Paresthesias/numbness   [120403]  -  Primary       Vital Signs     Last Menstrual Period Smoking Status                (Exact Date) Never Smoker           Basic Information     Date Of Birth Sex Race Ethnicity Preferred Language    1970 Female White Non- English      Your appointments     Sep 27, 2017  3:00 PM   New Patient with Jean Marie Shelley M.D.   [facility] (--)    [address], [address]  [facility] [id number] [phone]           Please bring Photo ID, Insurance Cards, All Medication Bottles and copies of any legal documents (such as Living Will, Power of ) If speaking a language besides English please bring an adult . Please arrive 30 minutes prior for check in and registration. You will be receiving a confirmation call a few days before your appointment from our automated call confirmation system.              Problem List              ICD-10-CM Priority Class Noted - Resolved    Chronic migraine without aura without status migrainosus, not intractable G43.709   2016 - Present    IC (interstitial cystitis) N30.10   2016 - Present    Gastroesophageal reflux disease without esophagitis K21.9   2016 - Present    Hypothyroidism due to acquired atrophy of thyroid E03.4   2016 - Present    Herpes labialis B00.1   6/15/2016 - Present    Paresthesia of both legs R20.2   2017 - Present    Paresthesias in left hand R20.2   2017 - Present      Health Maintenance        Date Due Completion Dates    IMM DTaP/Tdap/Td Vaccine (1 - Tdap) 7/26/1989 ---    MAMMOGRAM 11/1/2017 11/1/2016, 10/1/2015 (Done)    Override on 10/1/2015: Done (NL)    PAP SMEAR 10/1/2018 10/1/2015 (Done)    Override on 10/1/2015: Done (Nl, mirian)            Current Immunizations     Influenza Vaccine Adult HD 11/1/2016      Below and/or attached are the medications your provider expects you to take. Review all of your home medications and newly ordered medications with your provider and/or pharmacist. Follow medication instructions as directed by your provider and/or pharmacist. Please keep your medication list with you and share with your provider. Update the information when medications are discontinued, doses are changed, or new medications (including over-the-counter products) are added; and carry medication information at all times in the event of emergency situations     Allergies:  SULFA DRUGS - Unspecified               Medications  Valid as of: June 13, 2017 -  3:08 PM    Generic Name Brand Name Tablet Size Instructions for use    Acyclovir (Tab) ZOVIRAX 400 MG Take 800 mg by mouth every day.        Amitriptyline HCl (Tab) ELAVIL 25 MG Take 25 mg by mouth every evening.        Eletriptan Hydrobromide (Tab) RELPAX 40 MG Take 40 mg by mouth Once PRN.        Levothyroxine Sodium (Tab) SYNTHROID 25 MCG Take 1 Tab by mouth Every morning on an empty stomach.        Mometasone Furoate (Suspension) NASONEX 50 MCG/ACT Spray 2 Sprays in nose 2 Times a Day.        Montelukast Sodium (Tab) SINGULAIR 10 MG Take 1 Tab by mouth every day.        Pentosan Polysulfate Sodium (Cap) ELMIRON 100 MG Take 300 mg by mouth every day. At midnight.  Indications: Bladder Wall Inflammation        PredniSONE   Take  by mouth.        Rizatriptan Benzoate (Tab) MAXALT 10 MG Take 10 mg by mouth Once PRN for Migraine.        ZOLMitriptan (Solution) ZOLMitriptan 2.5 MG Spray 1 Spray in nose 1 time daily as needed.        .                  Medicines prescribed today were sent to:     [id number] - [place], [place] - [address]    [address] [facility]. [place] [address]    Phone: [phone] Fax: [phone]    Open 24 Hours?: No      Medication refill instructions:       If your prescription bottle indicates you have medication refills left, it is not necessary to call your provider’s office. Please contact your pharmacy and they will refill your medication.    If your prescription bottle indicates you do not have any refills left, you may request refills at any time through one of the following ways: The online [employer] system (except Urgent Care), by calling your provider’s office, or by asking your pharmacy to contact your provider’s office with a refill request. Medication refills are processed only during regular business hours and may not be available until the next business day. Your provider may request additional information or to have a follow-up visit with you prior to refilling your medication.   *Please Note: Medication refills are assigned a new Rx number when refilled electronically. Your pharmacy may indicate that no refills were authorized even though a new prescription for the same medication is available at the pharmacy. Please request the medicine by name with the pharmacy before contacting your provider for a refill.        Your To Do List     Future Labs/Procedures Complete By Expires    ANGIOTENSIN I CONVERTING ENZYME  As directed 6/13/2018    ANTI-NEUTROPHIL CYTOPLASMIC AB  As directed 6/13/2018    FOLATE  As directed 6/13/2018    MR-THORACIC SPINE-WITH & W/O  As directed 6/13/2018    SPEP W/REFLEX TO YAYA, ABA, G, M  As directed 6/13/2018    WESTERGREN SED RATE  As directed 12/12/2017         [employer] Access Code: Activation code not generated  Current [employer] Status: Active

## 2017-06-14 ENCOUNTER — HOSPITAL ENCOUNTER (OUTPATIENT)
Dept: LAB | Facility: MEDICAL CENTER | Age: 47
End: 2017-06-14
Attending: PSYCHIATRY & NEUROLOGY
Payer: COMMERCIAL

## 2017-06-14 LAB
CRP SERPL HS-MCNC: 1.5 MG/L (ref 0–7.5)
ERYTHROCYTE [SEDIMENTATION RATE] IN BLOOD BY WESTERGREN METHOD: 12 MM/HOUR (ref 0–20)
FOLATE SERPL-MCNC: 9.9 NG/ML

## 2017-06-14 PROCEDURE — 82746 ASSAY OF FOLIC ACID SERUM: CPT

## 2017-06-14 PROCEDURE — 86255 FLUORESCENT ANTIBODY SCREEN: CPT

## 2017-06-14 PROCEDURE — 84165 PROTEIN E-PHORESIS SERUM: CPT

## 2017-06-14 PROCEDURE — 85652 RBC SED RATE AUTOMATED: CPT

## 2017-06-14 PROCEDURE — 84160 ASSAY OF PROTEIN ANY SOURCE: CPT

## 2017-06-14 PROCEDURE — 86141 C-REACTIVE PROTEIN HS: CPT

## 2017-06-14 PROCEDURE — 36415 COLL VENOUS BLD VENIPUNCTURE: CPT

## 2017-06-14 PROCEDURE — 86039 ANTINUCLEAR ANTIBODIES (ANA): CPT

## 2017-06-14 PROCEDURE — 82164 ANGIOTENSIN I ENZYME TEST: CPT

## 2017-06-14 PROCEDURE — 86235 NUCLEAR ANTIGEN ANTIBODY: CPT

## 2017-06-14 PROCEDURE — 86038 ANTINUCLEAR ANTIBODIES: CPT

## 2017-06-14 NOTE — PROGRESS NOTES
Subjective:      Ct Montalvo is a 46 y.o. female who presents with her  Hemanth, from the office of JESUS Dickinson, for consultation with a history of nearly 12 days of ascending paresthesias involving the legs, transiently in the left arm, and with 2 abnormal MRI scans of the brain.     JAYMIE Fofana is a pleasant 46-year-old right-handed  female whose symptoms started spontaneously on May 31 after she got home from the gym. She noticed numbness in the soles of both feet, this persisted into the next day and over the next several days things began to ascend, now at about a midthoracic level. The legs began to feel somewhat heavy below the knees, she had some cramping sensation briefly in the right leg, these resolved. There was never pain in the legs, she denied back pain with radiating symptoms, bowel and bladder function were never affected though she had numbness in the perianal and genital regions, this also affected sexual intimacy and perception. Endurance was not affected, though she stopped going to the gym simple because the legs felt so unusual when walking. She denied any painful dysesthesias. Cognition, vision, and cranial nerve functions have all been spared. She does suffer from chronic fatigue but this did not acutely worsen.    She visited the emergency room several days later after symptoms started, the evaluations were unremarkable, though they were fairly basic. Imaging was not done. 4 days later when the left arm became transiently involved with tingling in the hand, she revisited, and was admitted overnight. MRI scan of the brain was done, compared to a study from December, 2016 when it was done for migraine headaches, MRI of the cervical and lumbar spines also were done. I reviewed the images myself, revealing the head scans with the patient and her , spinal imaging was unremarkable, brain scans revealed no enhancing lesions as well, but there was a clear  change in the number of white matter lesions seen, involving both cerebral hemispheres, and juxtacortical and periventricular locations, the more recent scan showing a developing lesion in the left cerebellar hemisphere. I had contacted the patient, prednisone 60 mg daily was started today, with a titration by 10 mg every other day. She now presents.    There was no inciting event at the time symptoms started, she denied a recent flulike illness, recent immunizations or trauma. She was not overly stressed, there have been no new medications started. She does relate a history of a left common peroneal nerve palsy occurring after delivery 15 years ago. This resolved completely. She also has a history of interstitial cystitis and migraine with and without aura. The aura is typically visual if present, lasts for 20 minutes before the headache begins. She typically will have a headache attack lasting upwards of 24 hours but occurring twice a week, rarely if she headache free for more than 2 weeks. Menses and alcohol are the most consistent triggers, they can awaken her from sleep, the headaches started years ago when she was in her 20s. She has tried several Triptans for rescue, she has been on Elavil 25 mg daily at bedtime, but no other maintenance therapies. There is no history of CVA, CAD, PVD, diabetes, hypertension, autoimmune disease, neurodegenerative disease, seizures, pulmonary disease, psychiatric disease, liver or kidney disease, systemic malignancy, reflux disease/IBD or demyelinating disease. There is no surgical history of note. Her mother had MS, passed away from complications of anticoagulation for atrial fibrillation, her father had dyslipidemia, her brother has migraine, no one else in the family has a history of demyelinating disease including both her sisters and her 15-year-old daughter who herself is alive and well. 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 She is also an avid pianist.    She is on Elavil 25 mg daily at bedtime, has a list of Zomig/Maxalt/Relpax used for her headaches, Synthroid, Singulair, Elmiron, and Nasonex nasal spray.    Review of Systems   Constitutional: Positive for malaise/fatigue.   Eyes: Negative for blurred vision and double vision.   Gastrointestinal: Negative for diarrhea.   Genitourinary: Negative for dysuria, urgency and frequency.   Musculoskeletal: Negative for falls.   Neurological: Positive for tingling, sensory change and headaches. Negative for dizziness, focal weakness and loss of consciousness.   Psychiatric/Behavioral: Negative for depression and memory loss.   All other systems reviewed and are negative.       Objective:     LMP  (Exact Date)     Physical Exam    She appears in no acute distress. Vital signs are stable. There is no malar rash or temporal tenderness. The neck is supple, range of motion is full, Lhermitte phenomena is absent. Cardiac evaluation reveals a regular rhythm. Straight leg raising is negative bilaterally. There is no evidence of diffuse rash, bruising, there is no lower extremity edema.    She is fully oriented, there is no evidence of aphasia, agnosia, apraxia, or inattention.    PERRLA/EOMI, visual fields are full to finger counting bilaterally on confrontation, funduscopic exam reveals sharp disc margins bilaterally without pallor, facial movements are symmetric, there is no sensory loss across the midline to temperature and pinprick, the tongue and uvula are midline, a jaw jerk is absent, shoulder shrug and head rotation are intact and symmetric.    Tone is normal throughout, there is no tremor, asterixis, or drift. Strength is 5/5 in all 4 extremities. Reflexes are slightly brisker in the left leg when compared to the right, they are symmetric in the upper extremities. The left toe is upgoing, the  "right downgoing.    Repetitive movements with the left foot and hand are slowed when compared to the right side, especially in the feet. There is no appendicular dystaxia. Heel and toe walking are intact, gait is of normal station, arm swing is symmetric.    Sensory exam does reveal a stocking loss of vibration in the lower extremities above the ankles, temperature and pinprick are felt throughout, there is no evidence of a spinal cord sensory level to temperature or pinprick. Vibration is intact in the upper extremities. Romberg is absent.     Assessment/Plan:     1. Paresthesias/numbness  I am concerned that we are seeing very mild MS develop in this young lady. She is probably genetically predisposed given the family history in her mother, she is certainly the right age and sex. The dynamic change on imaging of the brain with several, asymptomatic lesions involving in both cerebral hemispheres and now the left cerebellar hemisphere are consistent with this; fortunately there is no evidence of active disease with enhancement in these lesions. As well, she has findings on exam involving both motor and sensory functions, consistent with clinical \"spatial\" dispersion involving the nervous system. MRI imaging of thoracic spine does need to be performed since the symptom location suggests a mid thoracic localization. Even though there has been only one \"clinical\" attack, she fits the Rhoades criteria for demyelinating disease. Still, some additional diagnostics including autoimmune disease, vitamin deficiency states, paraproteinemia is, etc. do need to be ruled out. I don't think CSF study to rule out infection, vasculitis, etc. is necessary. Face-to-face time was spent reviewing all of the above. I was quite straightforward about the differential diagnosis and possibilities as a cause for her symptoms as well as the abnormalities seen on imaging. Though she has a history of migraine, the abnormalities on her brain " MRI are not the typical location and I would not expect a dynamic process being demonstrated. At the same time, if this is MS, it is quite mild, and at this point in time she is most likely to respond and maintain response. I discussed the case with Dr. Bloch, who agrees with my basic assessment and recommendations. Once imaging is been scheduled, we will set her with a follow-up appointment with me to review everything and to discuss appropriate treatments. She will complete the prednisone titration in the interim, she was told it will take several days for some benefit to be seen but I would anticipate a good response. On the other hand, if things continue to worsen, it may require IV methylprednisolone. She will call the office if this were the case.    - MR-THORACIC SPINE-WITH & W/O; Future  - WESTERGREN SED RATE; Future  - C-REACTIVE PROTEIN CARDIAC  - ANTI-NEUTROPHIL CYTOPLASMIC AB; Future  - CAMILA+NAOMY+C3+C4+RA QN+DNA/DS+  - SJOGREN'S AB, ANTI-SS-A/-SS-B  - ANGIOTENSIN I CONVERTING ENZYME; Future  - AMB TSH EXTERNAL RESULTS  - SPEP W/REFLEX TO YAYA, A, G, M; Future  - FOLATE; Future    Time: Evaluation 60 minutes for exam, review, discussion, and education  Discussion: As mentioned in assessment, over 50% of the time spent face-to-face counseling and coordinating care

## 2017-06-15 ENCOUNTER — TELEPHONE (OUTPATIENT)
Dept: MEDICAL GROUP | Facility: CLINIC | Age: 47
End: 2017-06-15

## 2017-06-15 NOTE — TELEPHONE ENCOUNTER
ESTABLISHED PATIENT PRE-VISIT PLANNING     Note: Patient will not be contacted if there is no indication to call.     1.  Reviewed notes from the last few office visits within the medical group: Yes    2.  If any orders were placed at last visit or intended to be done for this visit (i.e. 6 mos follow-up), do we have Results/Consult Notes?        •  Labs - Labs ordered, completed and results are in chart.       •  Imaging - Imaging ordered, completed and results are in chart.       •  Referrals - Referral ordered, patient was seen and consult notes are in chart. Care Teams updated  YES.    3. Is this appointment scheduled as a Hospital Follow-Up? No    4.  Immunizations were updated in Epic using WebIZ?: No WebIZ record          5.  Patient is due for the following Health Maintenance Topics:   Health Maintenance Due   Topic Date Due   • IMM DTaP/Tdap/Td Vaccine (1 - Tdap) 07/26/1989           6.  Patient was NOT informed to arrive 15 min prior to their scheduled appointment and bring in their medication bottles.

## 2017-06-19 ENCOUNTER — OFFICE VISIT (OUTPATIENT)
Dept: MEDICAL GROUP | Facility: CLINIC | Age: 47
End: 2017-06-19
Payer: COMMERCIAL

## 2017-06-19 VITALS
HEART RATE: 76 BPM | OXYGEN SATURATION: 99 % | HEIGHT: 64 IN | SYSTOLIC BLOOD PRESSURE: 112 MMHG | DIASTOLIC BLOOD PRESSURE: 60 MMHG | BODY MASS INDEX: 23.56 KG/M2 | RESPIRATION RATE: 14 BRPM | WEIGHT: 138 LBS | TEMPERATURE: 99.2 F

## 2017-06-19 DIAGNOSIS — K21.9 GASTROESOPHAGEAL REFLUX DISEASE WITHOUT ESOPHAGITIS: ICD-10-CM

## 2017-06-19 DIAGNOSIS — Z13.220 SCREENING, LIPID: ICD-10-CM

## 2017-06-19 DIAGNOSIS — Z00.00 ROUTINE GENERAL MEDICAL EXAMINATION AT A HEALTH CARE FACILITY: ICD-10-CM

## 2017-06-19 DIAGNOSIS — G43.709 CHRONIC MIGRAINE WITHOUT AURA WITHOUT STATUS MIGRAINOSUS, NOT INTRACTABLE: ICD-10-CM

## 2017-06-19 DIAGNOSIS — N30.10 IC (INTERSTITIAL CYSTITIS): ICD-10-CM

## 2017-06-19 DIAGNOSIS — R20.2 PARESTHESIA: ICD-10-CM

## 2017-06-19 DIAGNOSIS — E03.4 HYPOTHYROIDISM DUE TO ACQUIRED ATROPHY OF THYROID: ICD-10-CM

## 2017-06-19 PROCEDURE — 99396 PREV VISIT EST AGE 40-64: CPT | Performed by: NURSE PRACTITIONER

## 2017-06-19 NOTE — PROGRESS NOTES
CC: Annual Exam        HPI:     Ct presents today for the followin. Routine general medical examination at a health care facility  Here for annual physical required by work. No complaints    2. IC (interstitial cystitis)  Patient is compliant with elmiron.      3. Hypothyroidism due to acquired atrophy of thyroid  Compliant with levothyroxine. Senna be euthyroid this month    4. Gastroesophageal reflux disease without esophagitis  Asymptomatic and no longer has any problems. Does not require medication for this    5. Chronic migraine without aura without status migrainosus, not intractable  Seen by neurology for this. She takes amitriptyline and also uses a combination of Relpax, Maxalt and Zomig for control    6. Paresthesia  This is been fairly new over the last couple months and is now followed by neurology for this. They're ruling out MS. She's on oral steroids which have not improved her change her symptoms, she has not had any worsening. She has another MRI ordered tomorrow with a follow-up with neurology on Wednesday    7. Screening, lipid  Required for her physical    Current Outpatient Prescriptions   Medication Sig Dispense Refill   • PREDNISONE PO Take  by mouth.     • ZOLMitriptan (ZOMIG) 2.5 MG Solution Spray 1 Spray in nose 1 time daily as needed.     • rizatriptan (MAXALT) 10 MG tablet Take 10 mg by mouth Once PRN for Migraine.     • eletriptan (RELPAX) 40 MG tablet Take 40 mg by mouth Once PRN.     • acyclovir (ZOVIRAX) 400 MG tablet Take 800 mg by mouth every day.     • amitriptyline (ELAVIL) 25 MG Tab Take 25 mg by mouth every evening.     • mometasone (NASONEX) 50 MCG/ACT nasal spray Spray 2 Sprays in nose 2 Times a Day.     • levothyroxine (SYNTHROID) 25 MCG Tab Take 1 Tab by mouth Every morning on an empty stomach. 30 Tab 5   • montelukast (SINGULAIR) 10 MG Tab Take 1 Tab by mouth every day. 30 Tab 11   • pentosan (ELMIRON) 100 MG Cap Take 300 mg by mouth every day. At midnight.   "Indications: Bladder Wall Inflammation       No current facility-administered medications for this visit.     Social History   Substance Use Topics   • Smoking status: Never Smoker    • Smokeless tobacco: Never Used   • Alcohol Use: Yes      Comment: social     I reviewed patients allergies, problem list and medications today in [facility].    ROS: Any/all pertinent positives listed in the HPI, otherwise all others reviewed are negative today.      /60 mmHg  Pulse 76  Temp(Src) 37.3 °C (99.2 °F)  Resp 14  Ht 1.626 m (5' 4.02\")  Wt 62.596 kg (138 lb)  BMI 23.68 kg/m2  SpO2 99%  LMP 06/12/2017  Breastfeeding? No    Exam:    Gen: Alert and oriented, No apparent distress. WDWN  Psych: A+Ox3, normal affect and mood  Skin: Warm, dry and intact. Good turgor   No rashes in visible areas.  Eye: Conjunctiva clear, lids normal  ENMT: Lips without lesions, good dentition  Neck: No Lymphadenopathy, Thyromegaly, Bruits.   Trachea midline, no masses  Lungs: Clear to auscultation bilaterally, no rales or rhonchi   Unlabored respiratory effort.   CV: Regular rate and rhythm, S1, S2. No murmurs.   No Edema  Abd: Soft non tender, non distended. Normal active bowel sounds.    No Hepatosplenomegaly, No pulsatile masses.   Ext: No clubbing, cyanosis, edema.       Assessment and Plan.   46 y.o. female with the following issues.    1. Routine general medical examination at a health care facility  Normal physical exam    2. IC (interstitial cystitis)  Stable. Continue current medication management per urology  - URINALYSIS,CULTURE IF INDICATED; Future    3. Hypothyroidism due to acquired atrophy of thyroid  Stable. Continue current medication    4. Gastroesophageal reflux disease without esophagitis  Resolved    5. Chronic migraine without aura without status migrainosus, not intractable  Stable. Continue current medication and management per neurology    6. Paresthesia  Stable. Continue current medication and management per " neurology    7. Screening, lipid  Ordered  - LIPID PROFILE; Future

## 2017-06-20 ENCOUNTER — APPOINTMENT (OUTPATIENT)
Dept: RADIOLOGY | Facility: MEDICAL CENTER | Age: 47
End: 2017-06-20
Attending: PSYCHIATRY & NEUROLOGY
Payer: COMMERCIAL

## 2017-06-20 PROCEDURE — 72157 MRI CHEST SPINE W/O & W/DYE: CPT

## 2017-06-20 PROCEDURE — 700117 HCHG RX CONTRAST REV CODE 255: Performed by: PSYCHIATRY & NEUROLOGY

## 2017-06-20 PROCEDURE — A9579 GAD-BASE MR CONTRAST NOS,1ML: HCPCS | Performed by: PSYCHIATRY & NEUROLOGY

## 2017-06-20 RX ADMIN — GADODIAMIDE 14 ML: 287 INJECTION INTRAVENOUS at 16:23

## 2017-06-21 ENCOUNTER — OFFICE VISIT (OUTPATIENT)
Dept: NEUROLOGY | Facility: MEDICAL CENTER | Age: 47
End: 2017-06-21
Payer: COMMERCIAL

## 2017-06-21 VITALS
TEMPERATURE: 98.4 F | SYSTOLIC BLOOD PRESSURE: 96 MMHG | DIASTOLIC BLOOD PRESSURE: 60 MMHG | RESPIRATION RATE: 16 BRPM | WEIGHT: 141 LBS | HEIGHT: 64 IN | HEART RATE: 87 BPM | OXYGEN SATURATION: 100 % | BODY MASS INDEX: 24.07 KG/M2

## 2017-06-21 DIAGNOSIS — G35 MS (MULTIPLE SCLEROSIS) (HCC): Primary | ICD-10-CM

## 2017-06-21 LAB
ACE SERPL-CCNC: 21 U/L (ref 9–67)
ALBUMIN SERPL-MCNC: 4.98 G/DL (ref 3.75–5.01)
ALPHA1 GLOB SERPL ELPH-MCNC: 0.32 G/DL (ref 0.19–0.46)
ALPHA2 GLOB SERPL ELPH-MCNC: 0.68 G/DL (ref 0.48–1.05)
ANCA IGG TITR SER IF: NORMAL {TITER}
B-GLOBULIN SERPL ELPH-MCNC: 0.84 G/DL (ref 0.48–1.1)
ENA SS-B IGG SER IA-ACNC: 0 AU/ML (ref 0–40)
GAMMA GLOB SERPL ELPH-MCNC: 1.18 G/DL (ref 0.62–1.51)
INTERPRETATION SERPL IFE-IMP: NORMAL
MONOCLON BAND OBS SERPL: NORMAL
NUCLEAR IGG SER QL IA: DETECTED
NUCLEAR IGG TITR SER IF: ABNORMAL {TITER}
PATHOLOGY STUDY: NORMAL
PROT SERPL-MCNC: 8 G/DL (ref 6–8.3)
SSA52 R0ENA AB IGG Q0420: 2 AU/ML (ref 0–40)
SSA60 R0ENA AB IGG Q0419: 24 AU/ML (ref 0–40)

## 2017-06-21 PROCEDURE — 99214 OFFICE O/P EST MOD 30 MIN: CPT | Performed by: PSYCHIATRY & NEUROLOGY

## 2017-06-21 ASSESSMENT — ENCOUNTER SYMPTOMS
DEPRESSION: 0
HEADACHES: 0
FOCAL WEAKNESS: 0
TREMORS: 0
TINGLING: 1
MEMORY LOSS: 0
LOSS OF CONSCIOUSNESS: 0
SENSORY CHANGE: 1

## 2017-06-21 NOTE — MR AVS SNAPSHOT
"        Ct Montalvo   2017 9:20 AM   Office Visit   MRN: [id number]    Department:  [facility]   Dept Phone:  [phone]    Description:  Female : 1970   Provider:  Jean Marie Shelley M.D.           Reason for Visit     Follow-Up Paresthesias/numbness      Allergies as of 2017     Allergen Noted Reactions    Sulfa Drugs 2016   Unspecified    Internal burning sensation      You were diagnosed with     MS (multiple sclerosis) (CMS-HCC)   [241613]  -  Primary       Vital Signs     Blood Pressure Pulse Temperature Respirations Height Weight    96/60 mmHg 87 36.9 °C (98.4 °F) 16 1.626 m (5' 4\") 63.957 kg (141 lb)    Body Mass Index Oxygen Saturation Last Menstrual Period Smoking Status          24.19 kg/m2 100% 2017 Never Smoker         Basic Information     Date Of Birth Sex Race Ethnicity Preferred Language    1970 Female White Non- English      Problem List              ICD-10-CM Priority Class Noted - Resolved    Chronic migraine without aura without status migrainosus, not intractable G43.709   2016 - Present    IC (interstitial cystitis) N30.10   2016 - Present    Hypothyroidism due to acquired atrophy of thyroid E03.4   2016 - Present    Herpes labialis B00.1   6/15/2016 - Present    Paresthesia R20.2   2017 - Present    MS (multiple sclerosis) (CMS-HCC) G35   2017 - Present      Health Maintenance        Date Due Completion Dates    IMM DTaP/Tdap/Td Vaccine (1 - Tdap) 1989 ---    MAMMOGRAM 2017, 10/1/2015 (Done)    Override on 10/1/2015: Done (NL)    PAP SMEAR 10/1/2018 10/1/2015 (Done)    Override on 10/1/2015: Done (mirian Au)            Current Immunizations     Influenza Vaccine Adult HD 2016      Below and/or attached are the medications your provider expects you to take. Review all of your home medications and newly ordered medications with your provider and/or pharmacist. Follow medication " instructions as directed by your provider and/or pharmacist. Please keep your medication list with you and share with your provider. Update the information when medications are discontinued, doses are changed, or new medications (including over-the-counter products) are added; and carry medication information at all times in the event of emergency situations     Allergies:  SULFA DRUGS - Unspecified               Medications  Valid as of: June 21, 2017 - 10:23 AM    Generic Name Brand Name Tablet Size Instructions for use    Acyclovir (Tab) ZOVIRAX 400 MG Take 800 mg by mouth every day.        Amitriptyline HCl (Tab) ELAVIL 25 MG Take 25 mg by mouth every evening.        Eletriptan Hydrobromide (Tab) RELPAX 40 MG Take 40 mg by mouth Once PRN.        Levothyroxine Sodium (Tab) SYNTHROID 25 MCG Take 1 Tab by mouth Every morning on an empty stomach.        Mometasone Furoate (Suspension) NASONEX 50 MCG/ACT Spray 2 Sprays in nose 2 Times a Day.        Montelukast Sodium (Tab) SINGULAIR 10 MG Take 1 Tab by mouth every day.        Pentosan Polysulfate Sodium (Cap) ELMIRON 100 MG Take 300 mg by mouth every day. At midnight.  Indications: Bladder Wall Inflammation        PredniSONE   Take  by mouth.        Rizatriptan Benzoate (Tab) MAXALT 10 MG Take 10 mg by mouth Once PRN for Migraine.        ZOLMitriptan (Solution) ZOLMitriptan 2.5 MG Spray 1 Spray in nose 1 time daily as needed.        .                 Medicines prescribed today were sent to:     [id number] - [place], [place] - [address] [place] [facility].    [address] [facility]. [place] [address]    Phone: [phone] Fax: [phone]    Open 24 Hours?: No      Medication refill instructions:       If your prescription bottle indicates you have medication refills left, it is not necessary to call your provider’s office. Please contact your pharmacy and they will refill your medication.    If your prescription bottle indicates you do not have any refills left, you may request  refills at any time through one of the following ways: The online Findline system (except Urgent Care), by calling your provider’s office, or by asking your pharmacy to contact your provider’s office with a refill request. Medication refills are processed only during regular business hours and may not be available until the next business day. Your provider may request additional information or to have a follow-up visit with you prior to refilling your medication.   *Please Note: Medication refills are assigned a new Rx number when refilled electronically. Your pharmacy may indicate that no refills were authorized even though a new prescription for the same medication is available at the pharmacy. Please request the medicine by name with the pharmacy before contacting your provider for a refill.        Your To Do List     Future Labs/Procedures Complete By Expires    HEPATIC FUNCTION PANEL  As directed 6/21/2018    Quantiferon Gold TB (PPD)  As directed 6/21/2018         Findline Access Code: Activation code not generated  Current Findline Status: Active

## 2017-06-21 NOTE — PROGRESS NOTES
"Subjective:      Ct Montalvo is a 46 y.o. female who presents with her  Hemanth, for follow-up, with a history of possible MS, following a persistent event of mild transverse myelopathy.     HPI    Since last seen, on oral prednisone, the paresthesias in the legs and the constrictive feeling around the abdominal wall have improved slightly though they're still there. She has finished the prednisone titration. The weird feeling in the feet still limits her ability to walk, she is still uncomfortable with beginning to work out because of unsteadiness. There are no new symptoms. There is still some fatigue. Bowel and bladder are stable, the urinary urgency has improved. MRI scan of thoracic spine with and without contrast did reveal a T6/7 level enhancing lesion with some minimal cord swelling, consistent with demyelinating disease. Other inflammatory processes, including infection, etc. could be considered but less likely. There is no evidence of an expansile mass. Serologies for autoimmune diseases including Sjogren's, lupus, RA, paraproteinemia, B-12 and folate level and TSH all were normal/negative.    Medical, surgical and family histories are reviewed, there are no new drug allergies. She remains on her Elavil, Synthroid, Singulair, Elmiron, the rest as per the electronic health record.    Review of Systems   Constitutional: Negative for malaise/fatigue.   Neurological: Positive for tingling and sensory change. Negative for tremors, focal weakness, loss of consciousness and headaches.   Psychiatric/Behavioral: Negative for depression and memory loss.   All other systems reviewed and are negative.       Objective:     BP 96/60 mmHg  Pulse 87  Temp(Src) 36.9 °C (98.4 °F)  Resp 16  Ht 1.626 m (5' 4\")  Wt 63.957 kg (141 lb)  BMI 24.19 kg/m2  SpO2 100%  LMP 06/12/2017     Physical Exam    She appears distress. Vital signs are stable. There is no malar rash, temporal or jaw tenderness, jaw " "claudication, or allodynia. The neck is supple, range of motion is full, Lhermitte phenomena is absent. Cardiac evaluation is unremarkable. There is no gross evidence of language or cognitive deficit. 2 observation, PERRLA/EOMI, visual fields are full, there is no facial asymmetry or bulbar dysfunction. She moves all extremities equally, there is no gross hemiparesis. Fine motor control is intact in all 4 extremities, there is no appendicular dystaxia. She walks with normal station.     Assessment/Plan:     1. MS (multiple sclerosis) (CMS-[facility])  Given the abnormalities on serial brain MRI imaging, suggesting both temporal and spatial dispersion, combined with a singular clinical event, it all fits the Rhoades criteria for clinical remitting, relapsing MS. A very long conversation was then held about the nature of her disease, how the diagnosis can be established, however is different than other systemic illnesses with secondary CNS involvement. We talked about treatment options, I recommended getting onboard as quickly as possible given the earlier data and treatment responsiveness. We talked about available comparative studies, convenience is, etc. Given that she is no longer considering pregnancy, Aubagio is going to be started. We talked about efficacies, adverse events, side effects, etc. Information about the drug was prescribed, I also recommended she get on line and find out more about the other medications through independent MS organizations including the national MS Society. Once on medication, repeat imaging of the brain of thoracic cord can be done in 6 months and then again in one year assuming things are stable. Talked about signs and symptoms suggestive of disease relapse and how this differs from a \"pseudo-relapse\" which is transient symptom exacerbation related to heat, stress, etc. with rapid resolution within less than 24 hours, etc. Given that she still has symptoms in the legs and an enhancing " lesion in the spinal cord, IV methylprednisolone will be used, giving her 1 g/day for total of 3 days. She was told she may have some residual paresthesias in the feet, this can't be predicted but the IV steroids will help. She understands that disease modifying treatments do not help in the short-term, they're there to prevent disease progression and activity moving forwards. We talked about the differences between the most common remitting, relapsing form of the disease versus relapsing progressive and primary progressive, she was told that she does not have either of the latter forms. Phone contact in the interim, screening blood work for TB, VZV and liver profile will be checked, recent CBC and renal panels have been normal. Follow-up appointment will be scheduled in about 5 or 6 months after she's been on drug.    - VZV REAL TIME PCR  - Quantiferon Gold TB (PPD); Future  - HEPATIC FUNCTION PANEL; Future    Time: Evaluation of 25 minutes for exam come review, discussion, and education  Discussion: As mentioned in assessment, over 80% of the time spent face-to-face counseling and coordinating care

## 2017-06-22 ENCOUNTER — HOSPITAL ENCOUNTER (OUTPATIENT)
Dept: LAB | Facility: MEDICAL CENTER | Age: 47
End: 2017-06-22
Attending: PSYCHIATRY & NEUROLOGY
Payer: COMMERCIAL

## 2017-06-22 DIAGNOSIS — G35 MS (MULTIPLE SCLEROSIS) (HCC): ICD-10-CM

## 2017-06-24 ENCOUNTER — HOSPITAL ENCOUNTER (OUTPATIENT)
Dept: LAB | Facility: MEDICAL CENTER | Age: 47
End: 2017-06-24
Attending: PSYCHIATRY & NEUROLOGY
Payer: COMMERCIAL

## 2017-06-24 LAB
ALBUMIN SERPL BCP-MCNC: 4.5 G/DL (ref 3.2–4.9)
ALP SERPL-CCNC: 36 U/L (ref 30–99)
ALT SERPL-CCNC: 27 U/L (ref 2–50)
AST SERPL-CCNC: 15 U/L (ref 12–45)
BILIRUB CONJ SERPL-MCNC: 0.1 MG/DL (ref 0.1–0.5)
BILIRUB INDIRECT SERPL-MCNC: 0.4 MG/DL (ref 0–1)
BILIRUB SERPL-MCNC: 0.5 MG/DL (ref 0.1–1.5)
PROT SERPL-MCNC: 7.3 G/DL (ref 6–8.2)

## 2017-06-24 PROCEDURE — 86480 TB TEST CELL IMMUN MEASURE: CPT

## 2017-06-24 PROCEDURE — 87798 DETECT AGENT NOS DNA AMP: CPT

## 2017-06-24 PROCEDURE — 80076 HEPATIC FUNCTION PANEL: CPT

## 2017-06-24 PROCEDURE — 36415 COLL VENOUS BLD VENIPUNCTURE: CPT

## 2017-06-26 ENCOUNTER — OUTPATIENT INFUSION SERVICES (OUTPATIENT)
Dept: ONCOLOGY | Facility: MEDICAL CENTER | Age: 47
End: 2017-06-26
Attending: PSYCHIATRY & NEUROLOGY
Payer: COMMERCIAL

## 2017-06-26 VITALS
RESPIRATION RATE: 18 BRPM | WEIGHT: 142.2 LBS | BODY MASS INDEX: 23.69 KG/M2 | DIASTOLIC BLOOD PRESSURE: 61 MMHG | HEART RATE: 88 BPM | TEMPERATURE: 99 F | SYSTOLIC BLOOD PRESSURE: 115 MMHG | HEIGHT: 65 IN | OXYGEN SATURATION: 100 %

## 2017-06-26 PROCEDURE — 96365 THER/PROPH/DIAG IV INF INIT: CPT

## 2017-06-26 PROCEDURE — 700105 HCHG RX REV CODE 258: Performed by: PSYCHIATRY & NEUROLOGY

## 2017-06-26 PROCEDURE — 700111 HCHG RX REV CODE 636 W/ 250 OVERRIDE (IP): Performed by: PSYCHIATRY & NEUROLOGY

## 2017-06-26 RX ORDER — CETIRIZINE HYDROCHLORIDE 10 MG/1
10 TABLET ORAL DAILY
COMMUNITY

## 2017-06-26 RX ADMIN — SODIUM CHLORIDE 1000 MG: 9 INJECTION, SOLUTION INTRAVENOUS at 16:00

## 2017-06-26 ASSESSMENT — PAIN SCALES - GENERAL: PAINLEVEL: NO PAIN

## 2017-06-26 NOTE — PROGRESS NOTES
"Pt is here for her day #1 (of 3) solumedrol to treat newly diagnosed MS. Her MS symptoms are mainly lower extremity numbness. She is to start maintenance MS medication in a near future - awaiting a phone call from the provider. Information regarding solumedrol infusion/support group flyer provided. Infusion completed without an incident. Some \"metallic taste\" - no other infusion related discomforts. Pt prefers her PIV discontinued. Discharged home to self care. Next appointment verified.   "

## 2017-06-27 ENCOUNTER — TELEPHONE (OUTPATIENT)
Dept: NEUROLOGY | Facility: MEDICAL CENTER | Age: 47
End: 2017-06-27

## 2017-06-27 ENCOUNTER — OUTPATIENT INFUSION SERVICES (OUTPATIENT)
Dept: ONCOLOGY | Facility: MEDICAL CENTER | Age: 47
End: 2017-06-27
Attending: PSYCHIATRY & NEUROLOGY
Payer: COMMERCIAL

## 2017-06-27 VITALS
DIASTOLIC BLOOD PRESSURE: 74 MMHG | OXYGEN SATURATION: 99 % | BODY MASS INDEX: 23.73 KG/M2 | HEART RATE: 100 BPM | WEIGHT: 142.42 LBS | SYSTOLIC BLOOD PRESSURE: 121 MMHG | TEMPERATURE: 99.4 F | HEIGHT: 65 IN | RESPIRATION RATE: 18 BRPM

## 2017-06-27 LAB
M TB TUBERC IFN-G BLD QL: NEGATIVE
M TB TUBERC IFN-G/MITOGEN IGNF BLD: 0.02
M TB TUBERC IGNF/MITOGEN IGNF CONTROL: 7.48 [IU]/ML
MITOGEN IGNF BCKGRD COR BLD-ACNC: 0.03 [IU]/ML
SPECIMEN SOURCE: NORMAL
VZV DNA SPEC QL NAA+PROBE: NOT DETECTED

## 2017-06-27 PROCEDURE — 96365 THER/PROPH/DIAG IV INF INIT: CPT

## 2017-06-27 PROCEDURE — 700111 HCHG RX REV CODE 636 W/ 250 OVERRIDE (IP): Performed by: PSYCHIATRY & NEUROLOGY

## 2017-06-27 PROCEDURE — 700105 HCHG RX REV CODE 258: Performed by: PSYCHIATRY & NEUROLOGY

## 2017-06-27 RX ADMIN — SODIUM CHLORIDE 1000 MG: 9 INJECTION, SOLUTION INTRAVENOUS at 17:01

## 2017-06-27 ASSESSMENT — PAIN SCALES - GENERAL: PAINLEVEL: NO PAIN

## 2017-06-27 NOTE — TELEPHONE ENCOUNTER
Spoke to patient today, informing her of the doctor's advice regarding employment. She stated that she may want to move forward with a disability claim. She stated that she would get any paperwork to me and we would go from there.  - CR   348pm

## 2017-06-28 ENCOUNTER — OUTPATIENT INFUSION SERVICES (OUTPATIENT)
Dept: ONCOLOGY | Facility: MEDICAL CENTER | Age: 47
End: 2017-06-28
Attending: PSYCHIATRY & NEUROLOGY
Payer: COMMERCIAL

## 2017-06-28 VITALS
SYSTOLIC BLOOD PRESSURE: 110 MMHG | HEART RATE: 75 BPM | RESPIRATION RATE: 18 BRPM | WEIGHT: 144.18 LBS | DIASTOLIC BLOOD PRESSURE: 54 MMHG | BODY MASS INDEX: 24.02 KG/M2 | OXYGEN SATURATION: 98 % | HEIGHT: 65 IN | TEMPERATURE: 98 F

## 2017-06-28 PROCEDURE — 96365 THER/PROPH/DIAG IV INF INIT: CPT

## 2017-06-28 PROCEDURE — 700111 HCHG RX REV CODE 636 W/ 250 OVERRIDE (IP): Performed by: PSYCHIATRY & NEUROLOGY

## 2017-06-28 PROCEDURE — 700105 HCHG RX REV CODE 258: Performed by: PSYCHIATRY & NEUROLOGY

## 2017-06-28 RX ADMIN — SODIUM CHLORIDE 1000 MG: 9 INJECTION, SOLUTION INTRAVENOUS at 14:03

## 2017-06-28 ASSESSMENT — PAIN SCALES - GENERAL: PAINLEVEL: NO PAIN

## 2017-06-28 NOTE — PROGRESS NOTES
Pt to infusion services ambulatory per self and accompanied by spouse.  Pt here for scheduled, day #2 of 3 of IV SoluMedrol infusions.  Plan of care reviewed.  Pt verbalizes understanding.  Pt reports metallic taste, otherwise expresses no other complaints or concerns.  PIV established to LAC, #24G.  IV flushes easily; + blood return verified.  IV SoluMedrol administered per MD orders.  IV SoluMedrol infusion completed without incident.  Line flushed clear with normal saline.  No adverse effects observed or expressed.  PIV d/c'd per patient request.  Pressure dressing applied.  Pt released to self care and care of spouse in no apparent distress after completion of treatment, ambulatory.  Pt to return tomorrow for day #3 of 3; appointment scheduled.

## 2017-06-29 NOTE — PROGRESS NOTES
Ct arrives for day #3/3 of solumedrol. PIV established. Solumedrol given per MAR; no adverse reaction noted. PIV removed; guaze/coban applied over site. Patient to follow up with MD for future plan of care. Discharged to self care; no apparent distress noted.

## 2017-07-03 RX ORDER — MOMETASONE FUROATE 50 UG/1
2 SPRAY, METERED NASAL 2 TIMES DAILY
Qty: 1 INHALER | Refills: 11 | Status: SHIPPED | OUTPATIENT
Start: 2017-07-03 | End: 2018-09-16 | Stop reason: SDUPTHER

## 2017-07-10 RX ORDER — ACYCLOVIR 400 MG/1
TABLET ORAL
Qty: 60 TAB | Refills: 11 | Status: SHIPPED | OUTPATIENT
Start: 2017-07-10 | End: 2018-06-28 | Stop reason: SDUPTHER

## 2017-07-13 ENCOUNTER — TELEPHONE (OUTPATIENT)
Dept: NEUROLOGY | Facility: MEDICAL CENTER | Age: 47
End: 2017-07-13

## 2017-07-13 NOTE — TELEPHONE ENCOUNTER
Spoke to patient today regarding her concerns after getting advice from Dr. Shelley. I read the patient his note and talked with her. She said she understood where Karsten was coming from and she was ok with the choices he had made. We confirmed her October appointment. I advised the patient to please call or My Chart us if she had any other concerns or questions.

## 2017-07-24 ENCOUNTER — PATIENT MESSAGE (OUTPATIENT)
Dept: NEUROLOGY | Facility: MEDICAL CENTER | Age: 47
End: 2017-07-24

## 2017-07-24 DIAGNOSIS — G35 MS (MULTIPLE SCLEROSIS) (HCC): ICD-10-CM

## 2017-07-26 NOTE — TELEPHONE ENCOUNTER
Please inform the patient that a monthly liver profile has already been ordered, it is in the record, she simply needs to go to the laboratory at one of the [facility] facilities every month.

## 2017-07-31 ENCOUNTER — HOSPITAL ENCOUNTER (OUTPATIENT)
Dept: LAB | Facility: MEDICAL CENTER | Age: 47
End: 2017-07-31
Attending: PSYCHIATRY & NEUROLOGY
Payer: COMMERCIAL

## 2017-07-31 DIAGNOSIS — G35 MS (MULTIPLE SCLEROSIS) (HCC): ICD-10-CM

## 2017-07-31 LAB
ALBUMIN SERPL BCP-MCNC: 4.2 G/DL (ref 3.2–4.9)
ALP SERPL-CCNC: 53 U/L (ref 30–99)
ALT SERPL-CCNC: 41 U/L (ref 2–50)
AST SERPL-CCNC: 27 U/L (ref 12–45)
BILIRUB CONJ SERPL-MCNC: <0.1 MG/DL (ref 0.1–0.5)
BILIRUB INDIRECT SERPL-MCNC: NORMAL MG/DL (ref 0–1)
BILIRUB SERPL-MCNC: 0.5 MG/DL (ref 0.1–1.5)
PROT SERPL-MCNC: 6.8 G/DL (ref 6–8.2)

## 2017-07-31 PROCEDURE — 36415 COLL VENOUS BLD VENIPUNCTURE: CPT

## 2017-07-31 PROCEDURE — 80076 HEPATIC FUNCTION PANEL: CPT

## 2017-08-22 ENCOUNTER — PATIENT MESSAGE (OUTPATIENT)
Dept: MEDICAL GROUP | Facility: CLINIC | Age: 47
End: 2017-08-22

## 2017-08-22 DIAGNOSIS — N30.10 INTERSTITIAL CYSTITIS: ICD-10-CM

## 2017-08-22 NOTE — TELEPHONE ENCOUNTER
1. Caller Name: Ct Montalvo                                          Call Back Number: [phone] (home)        Patient approves a detailed voicemail message: N\A    2. SPECIFIC Action To Be Taken: Orders pending, please sign.    3. Diagnosis/Clinical Reason for Request: interstitial cystitis    4. Specialty & Provider Name/Lab/Imaging Location: Dr Umang Winters    5. Is appointment scheduled for requested order/referral: no    Patient informed they will receive a return phone call from the office ONLY if there are any questions before processing their request. Advised to call back if they haven't received a call from the referral department in 5 days.

## 2017-08-22 NOTE — TELEPHONE ENCOUNTER
From: Ct Montalvo  To: JUDD Briscoe  Sent: 8/22/2017 9:15 AM PDT  Subject: Non-Urgent Medical Question    Hi,  My interstitial cystitis is flaring up so I need to see my urologist, Dr. Umang Winters. However, since my insurance changed since I've seen him, I need a new referral. Would you mind sending him a referral so I can get an appointment ?    Thank you,  Georgiana Montalvo

## 2017-09-05 ENCOUNTER — TELEPHONE (OUTPATIENT)
Dept: NEUROLOGY | Facility: MEDICAL CENTER | Age: 47
End: 2017-09-05

## 2017-09-05 NOTE — TELEPHONE ENCOUNTER
----- Message from Rogers Nieves, Med Ass't sent at 9/1/2017  4:43 PM PDT -----  Regarding: FW: Non-Urgent Medical Question  Contact: [phone]      ----- Message -----  From: Ct Montalvo  Sent: 9/1/2017   4:27 PM  To: [facility]  Subject: Non-Urgent Medical Question                      Hi  Is it ok to get a flu shot? I take aubagio, 14 mg.  Thank you,  Georgiana Montalvo

## 2017-09-06 ENCOUNTER — HOSPITAL ENCOUNTER (OUTPATIENT)
Dept: LAB | Facility: MEDICAL CENTER | Age: 47
End: 2017-09-06
Attending: PSYCHIATRY & NEUROLOGY
Payer: COMMERCIAL

## 2017-09-06 DIAGNOSIS — G35 MS (MULTIPLE SCLEROSIS) (HCC): ICD-10-CM

## 2017-09-06 PROCEDURE — 36415 COLL VENOUS BLD VENIPUNCTURE: CPT

## 2017-09-06 PROCEDURE — 80076 HEPATIC FUNCTION PANEL: CPT

## 2017-09-07 LAB
ALBUMIN SERPL BCP-MCNC: 4.3 G/DL (ref 3.2–4.9)
ALP SERPL-CCNC: 60 U/L (ref 30–99)
ALT SERPL-CCNC: 39 U/L (ref 2–50)
AST SERPL-CCNC: 26 U/L (ref 12–45)
BILIRUB CONJ SERPL-MCNC: <0.1 MG/DL (ref 0.1–0.5)
BILIRUB INDIRECT SERPL-MCNC: NORMAL MG/DL (ref 0–1)
BILIRUB SERPL-MCNC: 0.4 MG/DL (ref 0.1–1.5)
PROT SERPL-MCNC: 7.2 G/DL (ref 6–8.2)

## 2017-09-09 ENCOUNTER — TELEPHONE (OUTPATIENT)
Dept: MEDICAL GROUP | Facility: PHYSICIAN GROUP | Age: 47
End: 2017-09-09

## 2017-09-09 ENCOUNTER — PATIENT MESSAGE (OUTPATIENT)
Dept: NEUROLOGY | Facility: MEDICAL CENTER | Age: 47
End: 2017-09-09

## 2017-09-09 NOTE — TELEPHONE ENCOUNTER
After hours phone call  Call on 9/9/2017 at 2:23 PM from Ct Montalvo [phone] (home)  who reports PCP: JUDD Briscoe.  Pt reports:recent dx with MS. She is on aubagio. increase sensitivity and some pain under her arm and back. She is concerned for shingles. No lesion apparent in the skin. She is on acyclovir   Plan: I advised pt to go to urgent care to see if there is no infection, but she states that there is no obvious lesions. Then I advised her to wait until Monday to be seen in a clinic. She is wondering about steroid for shingles. I'm not aware of steroid therapy on shingles. I advised pt to go to urgent care to be better evaluated.   Jania Ng M.D.

## 2017-10-05 ENCOUNTER — HOSPITAL ENCOUNTER (OUTPATIENT)
Dept: LAB | Facility: MEDICAL CENTER | Age: 47
End: 2017-10-05
Attending: NURSE PRACTITIONER
Payer: COMMERCIAL

## 2017-10-05 ENCOUNTER — NON-PROVIDER VISIT (OUTPATIENT)
Dept: MEDICAL GROUP | Facility: PHYSICIAN GROUP | Age: 47
End: 2017-10-05
Payer: COMMERCIAL

## 2017-10-05 ENCOUNTER — HOSPITAL ENCOUNTER (OUTPATIENT)
Dept: LAB | Facility: MEDICAL CENTER | Age: 47
End: 2017-10-05
Attending: PSYCHIATRY & NEUROLOGY
Payer: COMMERCIAL

## 2017-10-05 DIAGNOSIS — N30.10 IC (INTERSTITIAL CYSTITIS): ICD-10-CM

## 2017-10-05 DIAGNOSIS — Z23 NEED FOR INFLUENZA VACCINATION: ICD-10-CM

## 2017-10-05 DIAGNOSIS — G35 MS (MULTIPLE SCLEROSIS) (HCC): ICD-10-CM

## 2017-10-05 DIAGNOSIS — Z13.220 SCREENING, LIPID: ICD-10-CM

## 2017-10-05 DIAGNOSIS — R20.0 BILATERAL LEG NUMBNESS: ICD-10-CM

## 2017-10-05 LAB
ALBUMIN SERPL BCP-MCNC: 4.2 G/DL (ref 3.2–4.9)
ALP SERPL-CCNC: 59 U/L (ref 30–99)
ALT SERPL-CCNC: 38 U/L (ref 2–50)
APPEARANCE UR: CLEAR
AST SERPL-CCNC: 22 U/L (ref 12–45)
BACTERIA #/AREA URNS HPF: ABNORMAL /HPF
BASOPHILS # BLD AUTO: 1.1 % (ref 0–1.8)
BASOPHILS # BLD: 0.05 K/UL (ref 0–0.12)
BILIRUB CONJ SERPL-MCNC: <0.1 MG/DL (ref 0.1–0.5)
BILIRUB INDIRECT SERPL-MCNC: NORMAL MG/DL (ref 0–1)
BILIRUB SERPL-MCNC: 0.5 MG/DL (ref 0.1–1.5)
BILIRUB UR QL STRIP.AUTO: NEGATIVE
CHOLEST SERPL-MCNC: 186 MG/DL (ref 100–199)
COLOR UR: YELLOW
CULTURE IF INDICATED INDCX: YES UA CULTURE
EOSINOPHIL # BLD AUTO: 0.09 K/UL (ref 0–0.51)
EOSINOPHIL NFR BLD: 2 % (ref 0–6.9)
EPI CELLS #/AREA URNS HPF: ABNORMAL /HPF
ERYTHROCYTE [DISTWIDTH] IN BLOOD BY AUTOMATED COUNT: 46 FL (ref 35.9–50)
GLUCOSE UR STRIP.AUTO-MCNC: NEGATIVE MG/DL
HCT VFR BLD AUTO: 41.8 % (ref 37–47)
HDLC SERPL-MCNC: 68 MG/DL
HGB BLD-MCNC: 13.6 G/DL (ref 12–16)
HYALINE CASTS #/AREA URNS LPF: ABNORMAL /LPF
IMM GRANULOCYTES # BLD AUTO: 0.01 K/UL (ref 0–0.11)
IMM GRANULOCYTES NFR BLD AUTO: 0.2 % (ref 0–0.9)
KETONES UR STRIP.AUTO-MCNC: NEGATIVE MG/DL
LDLC SERPL CALC-MCNC: 102 MG/DL
LEUKOCYTE ESTERASE UR QL STRIP.AUTO: ABNORMAL
LYMPHOCYTES # BLD AUTO: 1.97 K/UL (ref 1–4.8)
LYMPHOCYTES NFR BLD: 44.6 % (ref 22–41)
MCH RBC QN AUTO: 32.6 PG (ref 27–33)
MCHC RBC AUTO-ENTMCNC: 32.5 G/DL (ref 33.6–35)
MCV RBC AUTO: 100.2 FL (ref 81.4–97.8)
MICRO URNS: ABNORMAL
MONOCYTES # BLD AUTO: 0.5 K/UL (ref 0–0.85)
MONOCYTES NFR BLD AUTO: 11.3 % (ref 0–13.4)
NEUTROPHILS # BLD AUTO: 1.8 K/UL (ref 2–7.15)
NEUTROPHILS NFR BLD: 40.8 % (ref 44–72)
NITRITE UR QL STRIP.AUTO: NEGATIVE
NRBC # BLD AUTO: 0 K/UL
NRBC BLD AUTO-RTO: 0 /100 WBC
PH UR STRIP.AUTO: 7 [PH]
PLATELET # BLD AUTO: 321 K/UL (ref 164–446)
PMV BLD AUTO: 10.4 FL (ref 9–12.9)
PROT SERPL-MCNC: 6.8 G/DL (ref 6–8.2)
PROT UR QL STRIP: NEGATIVE MG/DL
RBC # BLD AUTO: 4.17 M/UL (ref 4.2–5.4)
RBC # URNS HPF: ABNORMAL /HPF
RBC UR QL AUTO: NEGATIVE
SP GR UR STRIP.AUTO: 1.01
TRIGL SERPL-MCNC: 79 MG/DL (ref 0–149)
UROBILINOGEN UR STRIP.AUTO-MCNC: 0.2 MG/DL
VIT B12 SERPL-MCNC: 377 PG/ML (ref 211–911)
WBC # BLD AUTO: 4.4 K/UL (ref 4.8–10.8)
WBC #/AREA URNS HPF: ABNORMAL /HPF

## 2017-10-05 PROCEDURE — 87086 URINE CULTURE/COLONY COUNT: CPT

## 2017-10-05 PROCEDURE — 87077 CULTURE AEROBIC IDENTIFY: CPT

## 2017-10-05 PROCEDURE — 81001 URINALYSIS AUTO W/SCOPE: CPT

## 2017-10-05 PROCEDURE — 85025 COMPLETE CBC W/AUTO DIFF WBC: CPT

## 2017-10-05 PROCEDURE — 82607 VITAMIN B-12: CPT

## 2017-10-05 PROCEDURE — 80061 LIPID PANEL: CPT

## 2017-10-05 PROCEDURE — 90686 IIV4 VACC NO PRSV 0.5 ML IM: CPT | Performed by: FAMILY MEDICINE

## 2017-10-05 PROCEDURE — 87186 SC STD MICRODIL/AGAR DIL: CPT

## 2017-10-05 PROCEDURE — 80076 HEPATIC FUNCTION PANEL: CPT

## 2017-10-05 PROCEDURE — 90471 IMMUNIZATION ADMIN: CPT | Performed by: FAMILY MEDICINE

## 2017-10-05 PROCEDURE — 36415 COLL VENOUS BLD VENIPUNCTURE: CPT

## 2017-10-07 LAB
BACTERIA UR CULT: ABNORMAL
SIGNIFICANT IND 70042: ABNORMAL
SOURCE SOURCE: ABNORMAL

## 2017-10-08 ENCOUNTER — TELEPHONE (OUTPATIENT)
Dept: MEDICAL GROUP | Facility: CLINIC | Age: 47
End: 2017-10-08

## 2017-10-08 DIAGNOSIS — N39.0 URINARY TRACT INFECTION WITHOUT HEMATURIA, SITE UNSPECIFIED: ICD-10-CM

## 2017-10-08 RX ORDER — NITROFURANTOIN 25; 75 MG/1; MG/1
100 CAPSULE ORAL 2 TIMES DAILY
Qty: 14 CAP | Refills: 0 | Status: SHIPPED | OUTPATIENT
Start: 2017-10-08 | End: 2017-10-15

## 2017-10-08 NOTE — TELEPHONE ENCOUNTER
Please call the patient.  She has a uti, I sent macrobid 2x/day for 7 days to her pharmacy  I also sent her a [employer] message.

## 2017-10-09 DIAGNOSIS — N30.10 IC (INTERSTITIAL CYSTITIS): ICD-10-CM

## 2017-10-10 DIAGNOSIS — G43.109 MIGRAINE WITH AURA AND WITHOUT STATUS MIGRAINOSUS, NOT INTRACTABLE: ICD-10-CM

## 2017-10-11 RX ORDER — ZOLMITRIPTAN 5 MG/1
SPRAY, METERED NASAL
Qty: 6 EACH | Refills: 4 | Status: SHIPPED | OUTPATIENT
Start: 2017-10-11 | End: 2018-04-23

## 2017-10-23 ENCOUNTER — OFFICE VISIT (OUTPATIENT)
Dept: NEUROLOGY | Facility: MEDICAL CENTER | Age: 47
End: 2017-10-23
Payer: COMMERCIAL

## 2017-10-23 VITALS
DIASTOLIC BLOOD PRESSURE: 68 MMHG | OXYGEN SATURATION: 99 % | TEMPERATURE: 99.7 F | SYSTOLIC BLOOD PRESSURE: 106 MMHG | RESPIRATION RATE: 15 BRPM | HEART RATE: 91 BPM | BODY MASS INDEX: 23.8 KG/M2 | HEIGHT: 64 IN | WEIGHT: 139.4 LBS

## 2017-10-23 DIAGNOSIS — G43.009 MIGRAINE WITHOUT AURA AND WITHOUT STATUS MIGRAINOSUS, NOT INTRACTABLE: ICD-10-CM

## 2017-10-23 DIAGNOSIS — G35 MS (MULTIPLE SCLEROSIS) (HCC): Primary | ICD-10-CM

## 2017-10-23 PROCEDURE — 99214 OFFICE O/P EST MOD 30 MIN: CPT | Performed by: PSYCHIATRY & NEUROLOGY

## 2017-10-23 RX ORDER — MODAFINIL 200 MG/1
200 TABLET ORAL DAILY
Qty: 30 TAB | Refills: 0 | Status: SHIPPED | OUTPATIENT
Start: 2017-10-23 | End: 2017-12-16 | Stop reason: SDUPTHER

## 2017-10-23 ASSESSMENT — ENCOUNTER SYMPTOMS
MEMORY LOSS: 0
MYALGIAS: 0
HEADACHES: 1
TREMORS: 0
DEPRESSION: 0
SENSORY CHANGE: 1

## 2017-10-23 ASSESSMENT — PAIN SCALES - GENERAL: PAINLEVEL: NO PAIN

## 2017-10-24 NOTE — PROGRESS NOTES
"Subjective:      Ct Montalvo is a 47 y.o. female who presents With her  Hemanth, for follow-up, with a history of MS and migraine without aura.     HPI    MS: Since last seen, now on Aubagio 14 mg daily, she is tolerating the drug without issue. Monthly liver profiles have proven unremarkable. She had questions about immunizations, she was told that they're safe and probably a good idea. She still has the paresthesias in the left lower extremity, at times ascending to the hip, a slight distraction but nothing horrible. The fatigue she has is more of a problem, every 10 days or so she actually misses a day of work as a result. She will often take a \"power nap\" in the afternoon to catch up. She is doing well otherwise.    Migraine: Her headaches have been well controlled, she never required maintenance therapies, she was averaging 1-2 headaches which would last anywhere from 2-3 days only during a month, typically perimenstrual, and for these Zomig nasal spray was found to be more effective than Maxalt. Relpax was ineffective. She actually had several months with few to any headaches at all, this last month the old pattern has recurred. There is nothing else atypical about the headaches themselves. She was also having a few her milder headaches in between the severe headache attacks.    Medical, surgical and family histories are reviewed in electronic health record, there are no new drug allergies. She is on Aubagio 14 mg daily, Zomig 5 mg nasal spray when necessary, Maxalt 10 mg when necessary, Elavil 25 mg daily at bedtime, Synthroid, the rest is per the electronic health record.    Review of Systems   Constitutional: Positive for malaise/fatigue.   Musculoskeletal: Negative for myalgias.   Neurological: Positive for sensory change and headaches. Negative for tremors.   Psychiatric/Behavioral: Negative for depression and memory loss.   All other systems reviewed and are negative.       Objective:     BP " "106/68   Pulse 91   Temp 37.6 °C (99.7 °F)   Resp 15   Ht 1.626 m (5' 4\")   Wt 63.2 kg (139 lb 6.4 oz)   SpO2 99%   BMI 23.93 kg/m²      Physical Exam    She appears no acute distress. Vital signs are stable, her depression rating scale is 0. There was no malar rash. The neck is supple with a full range of motion. Cardiac evaluation reveals a regular rhythm. She is fully oriented, there is no aphasia. PERRLA/EOMI, facial movements are symmetric, visual fields are grossly full, there is no bulbar dysfunction. There is no weakness or loss coordination with the upper extremities. She stands and walks easily though there may be slight circumduction with the left lower extremity. There is no loss of fine motor control with the hands.     Assessment/Plan:     1. MS (multiple sclerosis) (CMS-HCC)  Clinically stable, we will continue Aubagio, for the next several months liver profiles will continue to be checked. I will follow-up in 6 months, last imaging of the brain was done in early June, 2017, the studies will be repeated in June, 2018. She understands signs and symptoms suggestive of disease relapse and what to do in the circumstances. She also understands the need for compliance with the Aubagio. She was told the drug seems to be doing well though she just began and clinical efficacy often is not seen until about 6 months or more after drug initiation. As for the fatigue itself, Provigil will be started at 200 mg daily. It can be increased if needed, though insomnia usually is the limiting factor. She is following up with her urologist for her interstitial cystitis, we need to avoid UTI recurrences since this can increase the risk of increasing disease activity.    2. Migraine without aura and without status migrainosus, not intractable  We will keep track of the headaches over the next couple of months, this last month may simply be an unusual fluke and bad stroke of blood, not necessarily a harbinger of " worsening headaches. If so, maintenance therapies can certainly be started simply and easily. Relpax will be discontinued, she was told that using a single rescue medication is ideal, something that I will try to do, having her use the Zomig nasal spray only.    Face-to-face time was spent reviewing all the above, we will follow-up in 6 months, phone contact in the interim.    Time: Evaluation of 25 minutes for exam, review, discussion, and education  Discussion: As mentioned in the assessment, over 80% of the time spent face-to-face counseling and coordinating care

## 2017-11-03 ENCOUNTER — HOSPITAL ENCOUNTER (OUTPATIENT)
Dept: RADIOLOGY | Facility: MEDICAL CENTER | Age: 47
End: 2017-11-03
Attending: OBSTETRICS & GYNECOLOGY
Payer: COMMERCIAL

## 2017-11-03 DIAGNOSIS — Z12.31 VISIT FOR SCREENING MAMMOGRAM: ICD-10-CM

## 2017-11-03 PROCEDURE — G0202 SCR MAMMO BI INCL CAD: HCPCS

## 2017-11-04 ENCOUNTER — HOSPITAL ENCOUNTER (OUTPATIENT)
Dept: LAB | Facility: MEDICAL CENTER | Age: 47
End: 2017-11-04
Attending: PSYCHIATRY & NEUROLOGY
Payer: COMMERCIAL

## 2017-11-04 ENCOUNTER — PATIENT MESSAGE (OUTPATIENT)
Dept: MEDICAL GROUP | Facility: CLINIC | Age: 47
End: 2017-11-04

## 2017-11-04 ENCOUNTER — HOSPITAL ENCOUNTER (OUTPATIENT)
Dept: LAB | Facility: MEDICAL CENTER | Age: 47
End: 2017-11-04
Attending: NURSE PRACTITIONER
Payer: COMMERCIAL

## 2017-11-04 DIAGNOSIS — G35 MS (MULTIPLE SCLEROSIS) (HCC): ICD-10-CM

## 2017-11-04 DIAGNOSIS — M54.5 CHRONIC LOW BACK PAIN, UNSPECIFIED BACK PAIN LATERALITY, WITH SCIATICA PRESENCE UNSPECIFIED: ICD-10-CM

## 2017-11-04 DIAGNOSIS — M54.2 NECK PAIN: ICD-10-CM

## 2017-11-04 DIAGNOSIS — G89.29 CHRONIC LOW BACK PAIN, UNSPECIFIED BACK PAIN LATERALITY, WITH SCIATICA PRESENCE UNSPECIFIED: ICD-10-CM

## 2017-11-04 LAB
ALBUMIN SERPL BCP-MCNC: 4.4 G/DL (ref 3.2–4.9)
ALP SERPL-CCNC: 64 U/L (ref 30–99)
ALT SERPL-CCNC: 44 U/L (ref 2–50)
AST SERPL-CCNC: 28 U/L (ref 12–45)
BILIRUB CONJ SERPL-MCNC: <0.1 MG/DL (ref 0.1–0.5)
BILIRUB INDIRECT SERPL-MCNC: NORMAL MG/DL (ref 0–1)
BILIRUB SERPL-MCNC: 0.4 MG/DL (ref 0.1–1.5)
PROT SERPL-MCNC: 6.9 G/DL (ref 6–8.2)

## 2017-11-04 PROCEDURE — 80076 HEPATIC FUNCTION PANEL: CPT

## 2017-11-04 PROCEDURE — 36415 COLL VENOUS BLD VENIPUNCTURE: CPT

## 2017-11-05 NOTE — TELEPHONE ENCOUNTER
From: Ct Montalvo  To: JUDD Briscoe  Sent: 11/4/2017 12:03 PM PDT  Subject: Non-Urgent Medical Question     Two things:  May I have a referral for physical therapy to Kaci Ibrahim at [facility]? It’s been a long time and my neck feels out. I am also experiencing some low back pain like when my pelvis is not straight.    Also i’d Like to get a shingles shot. Dr. Shelley ok’d it & [facility] told me I had to get it from my primary physician.  Than you,  Georgiana Montalvo

## 2017-11-09 ENCOUNTER — TELEPHONE (OUTPATIENT)
Dept: NEUROLOGY | Facility: MEDICAL CENTER | Age: 47
End: 2017-11-09

## 2017-11-09 RX ORDER — LEVOTHYROXINE SODIUM 0.03 MG/1
25 TABLET ORAL
Qty: 90 TAB | Refills: 2 | Status: SHIPPED | OUTPATIENT
Start: 2017-11-09 | End: 2018-08-18 | Stop reason: SDUPTHER

## 2017-11-09 NOTE — LETTER
"November 10, 2017        Ct Dasia Montalvo  CARMELINA: 1970    To Whom It May Concern:    This is a letter of appeal for authorization of Provigil to be used for symptomatic fatigue related to MS.    Unfortunately, there are no FDA approved medications to use for fatigue related to MS. It is still a community standard to use Provigil in this circumstance, the drug known to be effective and safe. Other treatment options are limited, though medical cannabis, certainly not FDA approved, Adderall and other stimulants (fraught with the issues of potentiation, abuse, etc.), amantadine, etc., have been used. Because none of these treatments have ever been studied in controlled fashion, all are \"off label\" in application, yet all are used regularly. Comparative studies are also not available, so to require one medicine be used in preference to another would simply be arbitrary.    Because fatigue can be a disabling symptom from MS, it is critical that adequate treatment be provided, since significant suffering, disability, etc. often results if it is not treated.    There are any additional questions in regards to this matter, my office can be contacted.    Sincerely:          TRELL Arias.    "

## 2017-11-10 NOTE — TELEPHONE ENCOUNTER
Attempted to obtain a prior authorization for Modafinil, prescribed by Dr. Shelley. [facility] stated prior auth was denied as MS is not a covered diagnosis code for this medication. If the doctor wishes to appeal, they will require a letter to be faxed to their appeals department at fax: [phone] with his reasoning.

## 2017-11-16 ENCOUNTER — TELEPHONE (OUTPATIENT)
Dept: MEDICAL GROUP | Facility: CLINIC | Age: 47
End: 2017-11-16

## 2017-11-16 NOTE — TELEPHONE ENCOUNTER
----- Message from Ct Montalvo sent at 11/15/2017 10:00 AM PST -----  Regarding: RE: Non-Urgent Medical Question  Contact: [phone]  I talked with my insurance company.  They said if you submit a prior authorization request for the shingles vaccine through the specialty pharmacy [facility] [phone], they will cover it.  Thank you in advance,  Georgiana  ----- Message -----  From: JUDD Briscoe  Sent: 11/6/2017  6:36 AM PST  To: Ct Montalvo  Subject: RE: Non-Urgent Medical Question  I am not sure on the cost, often over $200.  I would call your insurance and ask if they cover it and if not what the cost would be    ----- Message -----     From: Ct Montalvo     Sent: 11/5/2017  1:13 PM PST       To: JUDD Briscoe  Subject: Non-Urgent Medical Question    Insurance won’t cover it even for people like me who are immune compromised?   Do you know how much it would cost me if insurance won’t approve it?  ----- Message -----  From: JUDD Briscoe  Sent: 11/5/2017 12:09 PM PST  To: Ct Montalvo  Subject: RE: Non-Urgent Medical Question  We do not placed the shingles shot until 50 years of age based on FDA recommendations.  I do not think it would hurt you-however I also do not think your insurance will cover it until then    Referral for therapy is placed    ----- Message -----     From: Ct Montalvo     Sent: 11/4/2017 12:03 PM PDT       To: JUDD Briscoe  Subject: Non-Urgent Medical Question     Two things:  May I have a referral for physical therapy to Kaci Ibrahim at [facility]?  It’s been a long time and my neck feels out.  I am also experiencing some low back pain like when my pelvis is not straight.    Also i’d Like to get a shingles shot. Dr. Shelley ok’d it & [facility] told me I had to get it from my primary physician.  Than you,  Georgiana Montalvo

## 2017-11-22 NOTE — TELEPHONE ENCOUNTER
I keep trying to get PA via their phone number. I made my 3rd attempt today, the rep put me on hold again and the call dropped. I will try covermymeds and see if that is better.

## 2017-12-05 ENCOUNTER — HOSPITAL ENCOUNTER (OUTPATIENT)
Dept: LAB | Facility: MEDICAL CENTER | Age: 47
End: 2017-12-05
Attending: PSYCHIATRY & NEUROLOGY
Payer: COMMERCIAL

## 2017-12-05 DIAGNOSIS — G35 MS (MULTIPLE SCLEROSIS) (HCC): ICD-10-CM

## 2017-12-05 LAB
ALBUMIN SERPL BCP-MCNC: 4.7 G/DL (ref 3.2–4.9)
ALP SERPL-CCNC: 63 U/L (ref 30–99)
ALT SERPL-CCNC: 31 U/L (ref 2–50)
AST SERPL-CCNC: 20 U/L (ref 12–45)
BILIRUB CONJ SERPL-MCNC: 0.1 MG/DL (ref 0.1–0.5)
BILIRUB INDIRECT SERPL-MCNC: 0.3 MG/DL (ref 0–1)
BILIRUB SERPL-MCNC: 0.4 MG/DL (ref 0.1–1.5)
PROT SERPL-MCNC: 7.2 G/DL (ref 6–8.2)

## 2017-12-05 PROCEDURE — 36415 COLL VENOUS BLD VENIPUNCTURE: CPT

## 2017-12-05 PROCEDURE — 80076 HEPATIC FUNCTION PANEL: CPT

## 2017-12-07 ENCOUNTER — TELEPHONE (OUTPATIENT)
Dept: MEDICAL GROUP | Facility: CLINIC | Age: 47
End: 2017-12-07

## 2017-12-07 NOTE — TELEPHONE ENCOUNTER
----- Message from Ewelina Solitario, Med Ass't sent at 12/6/2017  4:49 PM PST -----  Regarding: FW:Shingles Vaccine  Contact: [phone]      ----- Message -----  From: TRELL Desai  Sent: 12/5/2017   7:58 AM  To: Laura Lincoln, Med Ass't  Subject: FW:Shingles Vaccine                                  ----- Message -----  From: Ct Montalvo  Sent: 12/2/2017  10:53 AM  To: Amb All Mas  Subject: RE:Shingles Vaccine                              Greg Sanchez,  I went to [facility] to receive the shingles shot and they said they need a prescription for anyone younger than 65.  May I please have this?    Thank you so much,  Georgiana  ----- Message -----  From: Laura Lincoln, Med Ass't  Sent: 11/27/2017  9:29 AM PST  To: Ct Montalvo  Subject: Shingles Vaccine  Good morning Georgiana,     I apologize for the delay. I had called [facility] times and during my conversations with a rep, they would put me on hold and then the call would be disconnected.   I had better luck today however. Per the rep Shakir @ [facility], he ran a Shingles shot claim to see how it would process and said that it was ok. No prior authorization needed and $0 copay. That you could get it from [facility] pharmacy.     Take care,  Laura LUNA MA

## 2017-12-16 DIAGNOSIS — G35 MS (MULTIPLE SCLEROSIS) (HCC): ICD-10-CM

## 2017-12-18 RX ORDER — MODAFINIL 200 MG/1
TABLET ORAL
Qty: 30 TAB | Refills: 4 | Status: SHIPPED | OUTPATIENT
Start: 2017-12-18 | End: 2018-05-12

## 2017-12-18 NOTE — TELEPHONE ENCOUNTER
Was the patient seen in the last year in this department? Yes     Does patient have an active prescription for medications requested? Yes      Received Request Via: Pharmacy

## 2017-12-20 DIAGNOSIS — G43.009 MIGRAINE WITHOUT AURA AND WITHOUT STATUS MIGRAINOSUS, NOT INTRACTABLE: ICD-10-CM

## 2017-12-21 ENCOUNTER — TELEPHONE (OUTPATIENT)
Dept: NEUROLOGY | Facility: MEDICAL CENTER | Age: 47
End: 2017-12-21

## 2017-12-29 ENCOUNTER — TELEPHONE (OUTPATIENT)
Dept: NEUROLOGY | Facility: MEDICAL CENTER | Age: 47
End: 2017-12-29

## 2017-12-29 DIAGNOSIS — G35 MS (MULTIPLE SCLEROSIS) (HCC): ICD-10-CM

## 2017-12-29 RX ORDER — TERIFLUNOMIDE 14 MG/1
1 TABLET, FILM COATED ORAL DAILY
Qty: 30 TAB | Refills: 11 | Status: SHIPPED | OUTPATIENT
Start: 2017-12-29 | End: 2018-06-18 | Stop reason: SDUPTHER

## 2018-01-02 ENCOUNTER — PATIENT MESSAGE (OUTPATIENT)
Dept: NEUROLOGY | Facility: MEDICAL CENTER | Age: 48
End: 2018-01-02

## 2018-01-02 RX ORDER — RIZATRIPTAN BENZOATE 10 MG/1
TABLET ORAL
Qty: 10 TAB | Refills: 6 | Status: SHIPPED | OUTPATIENT
Start: 2018-01-02 | End: 2018-07-25 | Stop reason: SDUPTHER

## 2018-01-02 NOTE — TELEPHONE ENCOUNTER
Pt is requesting a refill on Rizatriptan. Pt was seen on 10/23/17, has a f/v with you on 4/23/18.

## 2018-01-03 ENCOUNTER — PATIENT MESSAGE (OUTPATIENT)
Dept: NEUROLOGY | Facility: MEDICAL CENTER | Age: 48
End: 2018-01-03

## 2018-01-04 NOTE — TELEPHONE ENCOUNTER
----- Message from Rgoers ESTRADA Ezequiel, Med Ass't sent at 1/3/2018  2:11 PM PST -----  Regarding: FW: Non-Urgent Medical Question  Contact: [phone]      ----- Message -----  From: Valeria Ocamop, Med Ass't  Sent: 1/3/2018   1:07 PM  To: Rogers Nieves, Med Ass't  Subject: FW: Non-Urgent Medical Question                      ----- Message -----  From: Ct Montalvo  Sent: 1/3/2018  11:36 AM  To: Neurology Mas  Subject: Non-Urgent Medical Question                      Hi,  I talked with the aubagio 1 to 1 MS nurse recently for my 6 month [facility] follow-up.  I told her how I've been losing hair for the last 2 1/2 months but my  MS symptoms (numbness in feet and legs) are still under control.  I know the hair loss is a side effect of the aubagio, but she told me to make sure I let you know.      To clarify, I lose quite a bit of hair in the shower to the point that on my last hair cut, my stylist really didn't have anything to cut in terms of length.  My bangs had not grown much and  on each side of my forehead, the hair is really thin.  She is cutting my hair differently to cover these very thin spots.      I'm ok to stay with the aubagio for now unless you advise differently.    Thank you,  Georgiana

## 2018-01-04 NOTE — TELEPHONE ENCOUNTER
----- Message from Rogers Nieves, Med Ass't sent at 1/3/2018  2:11 PM PST -----  Regarding: FW: Prescription Question  Contact: [phone]      ----- Message -----  From: Valeria Ocampo, Med Ass't  Sent: 1/3/2018   1:07 PM  To: Rogers Nieves, Med Ass't  Subject: FW: Prescription Question                            ----- Message -----  From: Ct Montalvo  Sent: 1/3/2018  11:29 AM  To: Neurology Mas  Subject: Prescription Question                            Hi,  I'm currently taking the modafinil every day but now that I'm on vacation, I'm not needing it every day.  Can I take it sporadically while on vacation or is it best to take it every day?  I know when I get back to school, I'll need it every day.  Thanks for clarifying.    Georgiana Montalvo

## 2018-01-04 NOTE — TELEPHONE ENCOUNTER
Let Ct know that I am okay with staying on the Aubagio as long as she is comfortable with the degree of hair loss that she has right now. Unfortunately, it is not something double reverse unless she stops the drug entirely. We do have other effective treatments for her MS if we need to go there.

## 2018-01-05 ENCOUNTER — HOSPITAL ENCOUNTER (OUTPATIENT)
Dept: LAB | Facility: MEDICAL CENTER | Age: 48
End: 2018-01-05
Attending: PSYCHIATRY & NEUROLOGY
Payer: COMMERCIAL

## 2018-01-05 ENCOUNTER — HOSPITAL ENCOUNTER (OUTPATIENT)
Facility: MEDICAL CENTER | Age: 48
End: 2018-01-05
Attending: NURSE PRACTITIONER
Payer: COMMERCIAL

## 2018-01-05 DIAGNOSIS — G35 MS (MULTIPLE SCLEROSIS) (HCC): ICD-10-CM

## 2018-01-05 DIAGNOSIS — N30.10 IC (INTERSTITIAL CYSTITIS): ICD-10-CM

## 2018-01-05 LAB
ALBUMIN SERPL BCP-MCNC: 4.2 G/DL (ref 3.2–4.9)
ALP SERPL-CCNC: 59 U/L (ref 30–99)
ALT SERPL-CCNC: 39 U/L (ref 2–50)
APPEARANCE UR: CLEAR
AST SERPL-CCNC: 26 U/L (ref 12–45)
BILIRUB CONJ SERPL-MCNC: 0.1 MG/DL (ref 0.1–0.5)
BILIRUB INDIRECT SERPL-MCNC: 0.3 MG/DL (ref 0–1)
BILIRUB SERPL-MCNC: 0.4 MG/DL (ref 0.1–1.5)
BILIRUB UR QL STRIP.AUTO: NEGATIVE
COLOR UR: YELLOW
CULTURE IF INDICATED INDCX: NO UA CULTURE
GLUCOSE UR STRIP.AUTO-MCNC: NEGATIVE MG/DL
KETONES UR STRIP.AUTO-MCNC: NEGATIVE MG/DL
LEUKOCYTE ESTERASE UR QL STRIP.AUTO: NEGATIVE
MICRO URNS: NORMAL
NITRITE UR QL STRIP.AUTO: NEGATIVE
PH UR STRIP.AUTO: 7.5 [PH]
PROT SERPL-MCNC: 6.6 G/DL (ref 6–8.2)
PROT UR QL STRIP: NEGATIVE MG/DL
RBC UR QL AUTO: NEGATIVE
SP GR UR STRIP.AUTO: 1.01
UROBILINOGEN UR STRIP.AUTO-MCNC: 0.2 MG/DL

## 2018-01-05 PROCEDURE — 80076 HEPATIC FUNCTION PANEL: CPT

## 2018-01-05 PROCEDURE — 36415 COLL VENOUS BLD VENIPUNCTURE: CPT

## 2018-01-05 PROCEDURE — 81003 URINALYSIS AUTO W/O SCOPE: CPT

## 2018-02-05 ENCOUNTER — HOSPITAL ENCOUNTER (OUTPATIENT)
Dept: LAB | Facility: MEDICAL CENTER | Age: 48
End: 2018-02-05
Attending: PSYCHIATRY & NEUROLOGY
Payer: COMMERCIAL

## 2018-02-05 LAB
ALBUMIN SERPL BCP-MCNC: 4.6 G/DL (ref 3.2–4.9)
ALP SERPL-CCNC: 52 U/L (ref 30–99)
ALT SERPL-CCNC: 22 U/L (ref 2–50)
AST SERPL-CCNC: 18 U/L (ref 12–45)
BILIRUB CONJ SERPL-MCNC: <0.1 MG/DL (ref 0.1–0.5)
BILIRUB INDIRECT SERPL-MCNC: NORMAL MG/DL (ref 0–1)
BILIRUB SERPL-MCNC: 0.2 MG/DL (ref 0.1–1.5)
PROT SERPL-MCNC: 6.5 G/DL (ref 6–8.2)

## 2018-02-05 PROCEDURE — 36415 COLL VENOUS BLD VENIPUNCTURE: CPT

## 2018-02-05 PROCEDURE — 80076 HEPATIC FUNCTION PANEL: CPT

## 2018-02-20 ENCOUNTER — PATIENT MESSAGE (OUTPATIENT)
Dept: MEDICAL GROUP | Facility: CLINIC | Age: 48
End: 2018-02-20

## 2018-02-22 ENCOUNTER — TELEPHONE (OUTPATIENT)
Dept: MEDICAL GROUP | Facility: CLINIC | Age: 48
End: 2018-02-22

## 2018-02-22 NOTE — TELEPHONE ENCOUNTER
1. Caller Name: [facility]                                         Call Back Number: [phone]      Patient approves a detailed voicemail message: N\A    Pt's pharmacy is calling for Clarification for Elmiron Caps.  They say that the max recommended dose is 300 mg per day.  They are asking if it's ok to dispense the 400 mg per day that the Rx reads.

## 2018-02-22 NOTE — TELEPHONE ENCOUNTER
Per patient she is already on 400mg day per urology but I do not have the ability to verify this  Can we please get notes from Dr Winters--maybe call patient to clarify?    Discharge notes 6/2017 show 300mg/day only

## 2018-02-22 NOTE — TELEPHONE ENCOUNTER
Called patient and inquire about Elmiron dosage.  Patient stated she has been on 2 tab BID (400mg daily).

## 2018-02-24 ENCOUNTER — PATIENT MESSAGE (OUTPATIENT)
Dept: MEDICAL GROUP | Facility: CLINIC | Age: 48
End: 2018-02-24

## 2018-02-24 DIAGNOSIS — N30.10 IC (INTERSTITIAL CYSTITIS): ICD-10-CM

## 2018-02-25 RX ORDER — PHENAZOPYRIDINE HYDROCHLORIDE 200 MG/1
200 TABLET, FILM COATED ORAL 3 TIMES DAILY PRN
Qty: 6 TAB | Refills: 11 | Status: SHIPPED | OUTPATIENT
Start: 2018-02-25 | End: 2022-10-10 | Stop reason: SDUPTHER

## 2018-02-25 NOTE — TELEPHONE ENCOUNTER
From: Ct Montalvo  To: JUDD Briscoe  Sent: 2/24/2018 8:44 AM PST  Subject: Prescription Question    Hello,   Would you mind taking over my phenazopyridine 200 mg? I’m needing these right now since I’m in an interstitial cystitis flare b/c of reduced dosage over the last couple of weeks until I could get the elmiron debacle worked out.   [facility] [address]   I’m good for the weekend but am getting low.   Thanks,  Georgiana

## 2018-03-12 ENCOUNTER — HOSPITAL ENCOUNTER (OUTPATIENT)
Dept: LAB | Facility: MEDICAL CENTER | Age: 48
End: 2018-03-12
Attending: PSYCHIATRY & NEUROLOGY
Payer: COMMERCIAL

## 2018-03-12 LAB
ALBUMIN SERPL BCP-MCNC: 4.7 G/DL (ref 3.2–4.9)
ALP SERPL-CCNC: 57 U/L (ref 30–99)
ALT SERPL-CCNC: 21 U/L (ref 2–50)
AST SERPL-CCNC: 16 U/L (ref 12–45)
BILIRUB CONJ SERPL-MCNC: <0.1 MG/DL (ref 0.1–0.5)
BILIRUB INDIRECT SERPL-MCNC: NORMAL MG/DL (ref 0–1)
BILIRUB SERPL-MCNC: 0.3 MG/DL (ref 0.1–1.5)
PROT SERPL-MCNC: 7.3 G/DL (ref 6–8.2)

## 2018-03-12 PROCEDURE — 80076 HEPATIC FUNCTION PANEL: CPT

## 2018-03-12 PROCEDURE — 36415 COLL VENOUS BLD VENIPUNCTURE: CPT

## 2018-04-06 ENCOUNTER — HOSPITAL ENCOUNTER (OUTPATIENT)
Dept: LAB | Facility: MEDICAL CENTER | Age: 48
End: 2018-04-06
Attending: UROLOGY
Payer: COMMERCIAL

## 2018-04-06 LAB
AMBIGUOUS DTTM AMBI4: NORMAL
APPEARANCE UR: CLEAR
BACTERIA #/AREA URNS HPF: NEGATIVE /HPF
BILIRUB UR QL STRIP.AUTO: NEGATIVE
COLOR UR: YELLOW
EPI CELLS #/AREA URNS HPF: NORMAL /HPF
GLUCOSE UR STRIP.AUTO-MCNC: NEGATIVE MG/DL
HYALINE CASTS #/AREA URNS LPF: NORMAL /LPF
KETONES UR STRIP.AUTO-MCNC: NEGATIVE MG/DL
LEUKOCYTE ESTERASE UR QL STRIP.AUTO: ABNORMAL
MICRO URNS: ABNORMAL
NITRITE UR QL STRIP.AUTO: NEGATIVE
PH UR STRIP.AUTO: 7.5 [PH]
PROT UR QL STRIP: NEGATIVE MG/DL
RBC # URNS HPF: NORMAL /HPF
RBC UR QL AUTO: NEGATIVE
SP GR UR STRIP.AUTO: 1.01
UROBILINOGEN UR STRIP.AUTO-MCNC: 0.2 MG/DL
WBC #/AREA URNS HPF: NORMAL /HPF

## 2018-04-06 PROCEDURE — 81001 URINALYSIS AUTO W/SCOPE: CPT

## 2018-04-06 PROCEDURE — 87086 URINE CULTURE/COLONY COUNT: CPT

## 2018-04-08 LAB
BACTERIA UR CULT: NORMAL
SIGNIFICANT IND 70042: NORMAL
SITE SITE: NORMAL
SOURCE SOURCE: NORMAL

## 2018-04-16 ENCOUNTER — HOSPITAL ENCOUNTER (OUTPATIENT)
Dept: LAB | Facility: MEDICAL CENTER | Age: 48
End: 2018-04-16
Attending: PSYCHIATRY & NEUROLOGY
Payer: COMMERCIAL

## 2018-04-16 LAB
ALBUMIN SERPL BCP-MCNC: 4.5 G/DL (ref 3.2–4.9)
ALP SERPL-CCNC: 59 U/L (ref 30–99)
ALT SERPL-CCNC: 28 U/L (ref 2–50)
AST SERPL-CCNC: 19 U/L (ref 12–45)
BILIRUB CONJ SERPL-MCNC: <0.1 MG/DL (ref 0.1–0.5)
BILIRUB INDIRECT SERPL-MCNC: NORMAL MG/DL (ref 0–1)
BILIRUB SERPL-MCNC: 0.3 MG/DL (ref 0.1–1.5)
PROT SERPL-MCNC: 7.3 G/DL (ref 6–8.2)

## 2018-04-16 PROCEDURE — 80076 HEPATIC FUNCTION PANEL: CPT

## 2018-04-16 PROCEDURE — 36415 COLL VENOUS BLD VENIPUNCTURE: CPT

## 2018-04-23 ENCOUNTER — OFFICE VISIT (OUTPATIENT)
Dept: NEUROLOGY | Facility: MEDICAL CENTER | Age: 48
End: 2018-04-23
Payer: COMMERCIAL

## 2018-04-23 VITALS
SYSTOLIC BLOOD PRESSURE: 114 MMHG | HEIGHT: 64 IN | HEART RATE: 106 BPM | DIASTOLIC BLOOD PRESSURE: 64 MMHG | BODY MASS INDEX: 23.71 KG/M2 | OXYGEN SATURATION: 98 % | WEIGHT: 138.89 LBS | TEMPERATURE: 99.1 F

## 2018-04-23 DIAGNOSIS — G35 MS (MULTIPLE SCLEROSIS) (HCC): Primary | ICD-10-CM

## 2018-04-23 DIAGNOSIS — G43.009 MIGRAINE WITHOUT AURA AND WITHOUT STATUS MIGRAINOSUS, NOT INTRACTABLE: ICD-10-CM

## 2018-04-23 PROCEDURE — 99214 OFFICE O/P EST MOD 30 MIN: CPT | Performed by: PSYCHIATRY & NEUROLOGY

## 2018-04-23 ASSESSMENT — ENCOUNTER SYMPTOMS
TINGLING: 1
MEMORY LOSS: 0
FALLS: 0
CONSTIPATION: 0
TREMORS: 0
LOSS OF CONSCIOUSNESS: 0
BLURRED VISION: 0
SENSORY CHANGE: 1

## 2018-04-24 NOTE — PROGRESS NOTES
Subjective:      Ct Montalvo is a 47 y.o. female who presents with her  Heamnth, for follow-up, with a history of MS and migraine without aura.    HPI    MS: tC continues to do well. She has had no episodes suggestive of disease relapse. She remains on Aubagio 14 mg daily with good tolerability, and though she had developed some hair loss, this has spontaneously resolved. This transient alopecia is not unheard of. She denies issues with pruritus, icterus, recurrent infection, rash, etc. Monthly liver profiles have all been normal, the last drawn 2 weeks ago. Her last imaging of the spinal axis showed a stable burdens of disease in the brain, a new lesion in the thoracic cord, no enhancement, no edema, and the cervical cord was clean. She is scheduled for next series of imaging in a couple of months.    Symptomatically, her fatigue remains the most significant problem, on Provigil 200 mg daily, there have been brief intervals when she did not need the medicine at all, such as when she was on vacation, but now back at work, she is often using it twice a day with good tolerability and efficacy. She tried getting off at this time, and she paid the price later in the day. On the drug, she is cognitively intact, can continue to work full-time. The paresthesias in the feet are unchanged, balance has improved. She still has some of urinary urgency and discomfort related to her cystitis, she now has a new urologist to his doing some baseline studies, much to her chagrin. Bowel function has been maintained. She denies constrictive feelings across the chest or abdominal wall, vision has been maintained, she denies bulbar symptoms or focal motor deficits.    Migraine headache: These have increased slightly, though there are months when she still uses very few Maxalt 10 mg tablets. She has not been on maintenance therapies. The headaches do not typically recur on the second day, only rarely she will need to  "repeat a dose. If she waits too long, nausea kicks in, then she will have a problem. She was given Zomig 5 mg nasal spray which proves helpful, but not as effective as the Maxalt can be.    Medical, surgical and family histories reviewed in the electronic health record, there are no new drug allergies. She is on Aubagio 14 mg daily, Provigil 200 mg, twice a day, Elmiron, Synthroid, Elavil 25 mg daily at bedtime(used as a sleep aid and for her headaches, this also helps the urinary urgency), Singulair, Maxalt 10 mg as needed, Pyridium 200 mg as needed, the rest is per the electronic health record, noncontributory from my standpoint.    Review of Systems   Constitutional: Positive for malaise/fatigue.   Eyes: Negative for blurred vision.   Gastrointestinal: Negative for constipation.   Genitourinary: Negative for dysuria and frequency.   Musculoskeletal: Negative for falls.   Neurological: Positive for tingling and sensory change. Negative for tremors and loss of consciousness.   Psychiatric/Behavioral: Negative for memory loss.   All other systems reviewed and are negative.       Objective:     /64   Pulse (!) 106   Temp 37.3 °C (99.1 °F)   Ht 1.626 m (5' 4\")   Wt 63 kg (138 lb 14.2 oz)   SpO2 98%   BMI 23.84 kg/m²      Physical Exam    She appears in no acute distress. Her vital signs are stable, her pulse is slightly elevated at 106, her rhythm is regular. There is no malar rash or temporal tenderness. The neck is supple, range of motion is full, Lhermitte's phenomena is absent. Cardiac evaluation is unremarkable. Straight leg raising is negative bilaterally.    She is fully oriented, there is no facial or inattention. PERRLA/EOMI, visual fields are full to finger counting bilaterally, there is no facial asymmetry or sensory loss across the midline to light touch, the tongue and uvular midline, shoulder shrug and head rotation are intact.    Musculoskeletal exam shows normal tone throughout, there is no " tremor or drift. Strength is 5/5 in all muscle groups. Reflexes are still brisker on the left when compared to the right, the left toe is equivocal, the right is strongly downgoing.    She walks with normal station, heel, toe, and tandem walking are intact. Repetitive movements with the hands, fingers and feet are now intact and symmetric with normal amplitude and frequencies throughout. There is no appendicular dystaxia with any extremities.    Sensory exam is intact to vibration and temperature. Romberg is absent.     Assessment/Plan:     1. MS (multiple sclerosis) (CMS-HCC)  She is doing incredibly well on her present regimen, Aubagio obviously will be continued, we will continue with monthly liver profile checks for now, though the longer she remains stable, this frequency probably can be reduced. Follow-up MRI scan of the brain and thoracic cord will be ordered in June, we will call her with results if there is significant change, otherwise we will follow-up to talk specifics in 6 months. She was told she can call the office earlier if she wants if she does not hear from us about unremarkable results. She understands the symptoms suggestive of disease relapse, she knows to call the office in that circumstance.    Symptomatically, she was reassured that using Provigil twice a day is certainly safe to do. She can adjust the dose depending on need. I was upbeat as to how she is doing otherwise, she has had an amazingly good recovery and response to treatments, she is tolerating everything incredibly well. Face-to-face time was spent reviewing all the above.    - MR-BRAIN-WITH & W/O; Future  - RENAL FUNCTION PANEL; Future  - MR-THORACIC SPINE-WITH & W/O; Future    2. Migraine without aura and without status migrainosus, not intractable  Also stable, we will use the use of Maxalt 10 mg tablets as a measure of headache frequency and drug efficacy. She receives #10 tablets/month, if she starts running out before the  month is up, then we may need to reconsider maintenance therapies. Already on Elavil at bedtime, we may have difficulty pushing this upwards given her bladder issues. There are plenty of other treatment options available.    Time: Evaluation of 25 minutes for exam come review, discussion, and education  Discussion: As mentioned in assessment, over 60% of the time spent face-to-face counseling and correlating care

## 2018-05-15 ENCOUNTER — APPOINTMENT (OUTPATIENT)
Dept: LAB | Facility: MEDICAL CENTER | Age: 48
End: 2018-05-15
Payer: COMMERCIAL

## 2018-05-17 ENCOUNTER — HOSPITAL ENCOUNTER (OUTPATIENT)
Dept: LAB | Facility: MEDICAL CENTER | Age: 48
End: 2018-05-17
Attending: PSYCHIATRY & NEUROLOGY
Payer: COMMERCIAL

## 2018-05-17 LAB
ALBUMIN SERPL BCP-MCNC: 4.8 G/DL (ref 3.2–4.9)
ALP SERPL-CCNC: 59 U/L (ref 30–99)
ALT SERPL-CCNC: 33 U/L (ref 2–50)
AST SERPL-CCNC: 20 U/L (ref 12–45)
BILIRUB CONJ SERPL-MCNC: <0.1 MG/DL (ref 0.1–0.5)
BILIRUB INDIRECT SERPL-MCNC: NORMAL MG/DL (ref 0–1)
BILIRUB SERPL-MCNC: 0.3 MG/DL (ref 0.1–1.5)
BUN SERPL-MCNC: 13 MG/DL (ref 8–22)
CALCIUM SERPL-MCNC: 9.5 MG/DL (ref 8.5–10.5)
CHLORIDE SERPL-SCNC: 104 MMOL/L (ref 96–112)
CO2 SERPL-SCNC: 28 MMOL/L (ref 20–33)
CREAT SERPL-MCNC: 0.69 MG/DL (ref 0.5–1.4)
GLUCOSE SERPL-MCNC: 81 MG/DL (ref 65–99)
PHOSPHATE SERPL-MCNC: 3.7 MG/DL (ref 2.5–4.5)
POTASSIUM SERPL-SCNC: 4.2 MMOL/L (ref 3.6–5.5)
PROT SERPL-MCNC: 7.4 G/DL (ref 6–8.2)
SODIUM SERPL-SCNC: 138 MMOL/L (ref 135–145)

## 2018-05-17 PROCEDURE — 84075 ASSAY ALKALINE PHOSPHATASE: CPT

## 2018-05-17 PROCEDURE — 84155 ASSAY OF PROTEIN SERUM: CPT

## 2018-05-17 PROCEDURE — 84460 ALANINE AMINO (ALT) (SGPT): CPT

## 2018-05-17 PROCEDURE — 82248 BILIRUBIN DIRECT: CPT

## 2018-05-17 PROCEDURE — 36415 COLL VENOUS BLD VENIPUNCTURE: CPT

## 2018-05-17 PROCEDURE — 80069 RENAL FUNCTION PANEL: CPT

## 2018-05-17 PROCEDURE — 84450 TRANSFERASE (AST) (SGOT): CPT

## 2018-05-17 PROCEDURE — 82247 BILIRUBIN TOTAL: CPT

## 2018-05-22 DIAGNOSIS — G35 MS (MULTIPLE SCLEROSIS) (HCC): ICD-10-CM

## 2018-05-22 RX ORDER — MODAFINIL 200 MG/1
TABLET ORAL
Qty: 30 TAB | Refills: 0 | Status: SHIPPED | OUTPATIENT
Start: 2018-05-22 | End: 2018-05-24 | Stop reason: SDUPTHER

## 2018-05-24 DIAGNOSIS — G35 MS (MULTIPLE SCLEROSIS) (HCC): ICD-10-CM

## 2018-05-24 RX ORDER — MODAFINIL 200 MG/1
400 TABLET ORAL
Qty: 30 TAB | Refills: 0 | Status: SHIPPED | OUTPATIENT
Start: 2018-05-24 | End: 2018-06-23

## 2018-05-24 NOTE — TELEPHONE ENCOUNTER
Patient called for Dr. Shelley to see if he would change the directions so she could take 2 tabs qday. Please advise.

## 2018-05-30 ENCOUNTER — PATIENT MESSAGE (OUTPATIENT)
Dept: NEUROLOGY | Facility: MEDICAL CENTER | Age: 48
End: 2018-05-30

## 2018-05-31 NOTE — TELEPHONE ENCOUNTER
Tell Ct that if her symptoms have continued through today, Thursday, and especially they have continued to increase or worsen, this may in fact be a relapse, we can arrange for IV steroids on an outpatient basis. I believe she already has MRI scan of her brain and thoracic cord set up for June, this should be continued. Asked if she has had any recent flulike illnesses, if there has been any change in urine or bowel function with this most recent symptom complex.

## 2018-06-15 ENCOUNTER — PATIENT MESSAGE (OUTPATIENT)
Dept: MEDICAL GROUP | Facility: CLINIC | Age: 48
End: 2018-06-15

## 2018-06-15 DIAGNOSIS — Z13.21 ENCOUNTER FOR VITAMIN DEFICIENCY SCREENING: ICD-10-CM

## 2018-06-15 DIAGNOSIS — R20.2 PARESTHESIA: ICD-10-CM

## 2018-06-15 DIAGNOSIS — E03.4 HYPOTHYROIDISM DUE TO ACQUIRED ATROPHY OF THYROID: ICD-10-CM

## 2018-06-15 NOTE — TELEPHONE ENCOUNTER
From: Ct Montalvo  To: JUDD Briscoe  Sent: 6/15/2018 10:23 AM PDT  Subject: Non-Urgent Medical Question    Good morning ,   I have an appointment scheduled for June 25. Could I please have lab orders before hand so they would be available for you to view on my appointment day? I would really love to have my thyroid and vitamin D checked along with the other usual labs.     Secondly, I am experiencing tingling in my arms, more so in my right arm. I have seen Kaci Ibrahim, physical therapist already and she has done what she can. She does think it’s likely neck related, not MS related. I would like a referral to Shaneka Stock to see if she can help. I will also be in touch with my neurologist in case he would like to do further testing himself.     Thanks so much,  Georgiana

## 2018-06-17 NOTE — TELEPHONE ENCOUNTER
From: Ct Montalvo  To: JUDD Briscoe  Sent: 6/15/2018 12:56 PM PDT  Subject: Non-Urgent Medical Question     She’s a physical therapist. Sorry about that. I have used her before and you had referred me before. Thanks!  Georgiana  ----- Message -----  From: JUDD Briscoe  Sent: 6/15/2018 10:29 AM PDT  To: Ct Montalvo  Subject: RE: Non-Urgent Medical Question  I will order labs.  What type of provider is shaneka phelps so that I can place the referral ?    ----- Message -----   From: Ct Montalvo   Sent: 6/15/2018 10:23 AM PDT   To: JUDD Briscoe  Subject: Non-Urgent Medical Question    Good morning ,   I have an appointment scheduled for June 25. Could I please have lab orders before hand so they would be available for you to view on my appointment day? I would really love to have my thyroid and vitamin D checked along with the other usual labs.     Secondly, I am experiencing tingling in my arms, more so in my right arm. I have seen Kaci Ibrahim, physical therapist already and she has done what she can. She does think it’s likely neck related, not MS related. I would like a referral to Shaneka Phelps to see if she can help. I will also be in touch with my neurologist in case he would like to do further testing himself.     Thanks so much,  Georgiana

## 2018-06-18 DIAGNOSIS — G35 MS (MULTIPLE SCLEROSIS) (HCC): ICD-10-CM

## 2018-06-18 RX ORDER — TERIFLUNOMIDE 14 MG/1
1 TABLET, FILM COATED ORAL DAILY
Qty: 30 TAB | Refills: 11 | Status: SHIPPED | OUTPATIENT
Start: 2018-06-18 | End: 2019-05-31 | Stop reason: SDUPTHER

## 2018-06-19 ENCOUNTER — HOSPITAL ENCOUNTER (OUTPATIENT)
Dept: RADIOLOGY | Facility: MEDICAL CENTER | Age: 48
End: 2018-06-19
Attending: PSYCHIATRY & NEUROLOGY
Payer: COMMERCIAL

## 2018-06-19 DIAGNOSIS — G35 MS (MULTIPLE SCLEROSIS) (HCC): ICD-10-CM

## 2018-06-19 PROCEDURE — 70553 MRI BRAIN STEM W/O & W/DYE: CPT

## 2018-06-19 PROCEDURE — A9579 GAD-BASE MR CONTRAST NOS,1ML: HCPCS | Performed by: PSYCHIATRY & NEUROLOGY

## 2018-06-19 PROCEDURE — 72157 MRI CHEST SPINE W/O & W/DYE: CPT

## 2018-06-19 PROCEDURE — 700117 HCHG RX CONTRAST REV CODE 255: Performed by: PSYCHIATRY & NEUROLOGY

## 2018-06-19 RX ADMIN — GADODIAMIDE 15 ML: 287 INJECTION INTRAVENOUS at 14:00

## 2018-06-25 ENCOUNTER — HOSPITAL ENCOUNTER (OUTPATIENT)
Dept: LAB | Facility: MEDICAL CENTER | Age: 48
End: 2018-06-25
Attending: NURSE PRACTITIONER
Payer: COMMERCIAL

## 2018-06-25 ENCOUNTER — HOSPITAL ENCOUNTER (OUTPATIENT)
Dept: LAB | Facility: MEDICAL CENTER | Age: 48
End: 2018-06-25
Attending: PSYCHIATRY & NEUROLOGY
Payer: COMMERCIAL

## 2018-06-25 DIAGNOSIS — Z13.21 ENCOUNTER FOR VITAMIN DEFICIENCY SCREENING: ICD-10-CM

## 2018-06-25 DIAGNOSIS — G35 MS (MULTIPLE SCLEROSIS) (HCC): ICD-10-CM

## 2018-06-25 DIAGNOSIS — E03.4 HYPOTHYROIDISM DUE TO ACQUIRED ATROPHY OF THYROID: ICD-10-CM

## 2018-06-25 LAB
25(OH)D3 SERPL-MCNC: 53 NG/ML (ref 30–100)
ALBUMIN SERPL BCP-MCNC: 4.2 G/DL (ref 3.2–4.9)
ALBUMIN SERPL BCP-MCNC: 4.3 G/DL (ref 3.2–4.9)
ALBUMIN/GLOB SERPL: 1.7 G/DL
ALP SERPL-CCNC: 51 U/L (ref 30–99)
ALT SERPL-CCNC: 31 U/L (ref 2–50)
ANION GAP SERPL CALC-SCNC: 6 MMOL/L (ref 0–11.9)
AST SERPL-CCNC: 20 U/L (ref 12–45)
BILIRUB SERPL-MCNC: 0.5 MG/DL (ref 0.1–1.5)
BUN SERPL-MCNC: 10 MG/DL (ref 8–22)
BUN SERPL-MCNC: 10 MG/DL (ref 8–22)
CALCIUM SERPL-MCNC: 8.8 MG/DL (ref 8.5–10.5)
CALCIUM SERPL-MCNC: 9.1 MG/DL (ref 8.5–10.5)
CHLORIDE SERPL-SCNC: 105 MMOL/L (ref 96–112)
CHLORIDE SERPL-SCNC: 108 MMOL/L (ref 96–112)
CO2 SERPL-SCNC: 27 MMOL/L (ref 20–33)
CO2 SERPL-SCNC: 28 MMOL/L (ref 20–33)
CREAT SERPL-MCNC: 0.68 MG/DL (ref 0.5–1.4)
CREAT SERPL-MCNC: 0.7 MG/DL (ref 0.5–1.4)
GLOBULIN SER CALC-MCNC: 2.5 G/DL (ref 1.9–3.5)
GLUCOSE SERPL-MCNC: 77 MG/DL (ref 65–99)
GLUCOSE SERPL-MCNC: 86 MG/DL (ref 65–99)
PHOSPHATE SERPL-MCNC: 3.2 MG/DL (ref 2.5–4.5)
POTASSIUM SERPL-SCNC: 3.9 MMOL/L (ref 3.6–5.5)
POTASSIUM SERPL-SCNC: 4 MMOL/L (ref 3.6–5.5)
PROT SERPL-MCNC: 6.8 G/DL (ref 6–8.2)
SODIUM SERPL-SCNC: 139 MMOL/L (ref 135–145)
SODIUM SERPL-SCNC: 139 MMOL/L (ref 135–145)
TSH SERPL DL<=0.005 MIU/L-ACNC: 4.29 UIU/ML (ref 0.38–5.33)

## 2018-06-25 PROCEDURE — 80053 COMPREHEN METABOLIC PANEL: CPT

## 2018-06-25 PROCEDURE — 80069 RENAL FUNCTION PANEL: CPT

## 2018-06-25 PROCEDURE — 84443 ASSAY THYROID STIM HORMONE: CPT

## 2018-06-25 PROCEDURE — 36415 COLL VENOUS BLD VENIPUNCTURE: CPT

## 2018-06-25 PROCEDURE — 82306 VITAMIN D 25 HYDROXY: CPT

## 2018-06-28 ENCOUNTER — PATIENT MESSAGE (OUTPATIENT)
Dept: MEDICAL GROUP | Facility: CLINIC | Age: 48
End: 2018-06-28

## 2018-06-28 RX ORDER — ACYCLOVIR 400 MG/1
TABLET ORAL
Qty: 60 TAB | Refills: 11 | Status: SHIPPED | OUTPATIENT
Start: 2018-06-28 | End: 2019-06-10 | Stop reason: SDUPTHER

## 2018-06-28 NOTE — TELEPHONE ENCOUNTER
From: Ct Montalvo  To: JUDD Briscoe  Sent: 6/28/2018 7:16 AM PDT  Subject: Non-Urgent Medical Question    Good morning, my pharmacy told me you would not renew my Acyclovir prescription. I was just curious if there was a problem?  Thanks,  Georgiana

## 2018-07-03 ENCOUNTER — TELEPHONE (OUTPATIENT)
Dept: NEUROLOGY | Facility: CLINIC | Age: 48
End: 2018-07-03

## 2018-07-03 NOTE — TELEPHONE ENCOUNTER
Tell the patient everything is stable. There is no new disease in her brain, the thoracic cord lesion is stable also, there are no new lesions involving the thoracic spine. I am very pleased about all of this. I had already reviewed the images, and tell the patient we would have called her had I been concerned.

## 2018-07-03 NOTE — TELEPHONE ENCOUNTER
----- Message from Rogers Nieves, Med Ass't sent at 6/29/2018  9:50 AM PDT -----  Regarding: FW: Test Result Question  Contact: [phone]      ----- Message -----  From: Ct Montalvo  Sent: 6/28/2018   9:02 PM  To: Neurology Mas  Subject: Test Result Question                              Good evening,   I looked at my MRI  test results but obviously with the technical and medical jargon, I don’t quite understand them. How did they look? Do I seem to be stable? Are there any more lesions and/or have the current ones grown bigger? I don’t know what else to ask so thank you for any information you can give me.   Sincerely ,  Georgiana

## 2018-07-09 ENCOUNTER — OFFICE VISIT (OUTPATIENT)
Dept: MEDICAL GROUP | Facility: CLINIC | Age: 48
End: 2018-07-09
Payer: COMMERCIAL

## 2018-07-09 VITALS
TEMPERATURE: 98 F | WEIGHT: 142 LBS | HEIGHT: 64 IN | SYSTOLIC BLOOD PRESSURE: 114 MMHG | DIASTOLIC BLOOD PRESSURE: 60 MMHG | HEART RATE: 80 BPM | RESPIRATION RATE: 14 BRPM | BODY MASS INDEX: 24.24 KG/M2 | OXYGEN SATURATION: 99 %

## 2018-07-09 DIAGNOSIS — N30.10 IC (INTERSTITIAL CYSTITIS): ICD-10-CM

## 2018-07-09 DIAGNOSIS — G43.009 MIGRAINE WITHOUT AURA AND WITHOUT STATUS MIGRAINOSUS, NOT INTRACTABLE: ICD-10-CM

## 2018-07-09 DIAGNOSIS — E03.4 HYPOTHYROIDISM DUE TO ACQUIRED ATROPHY OF THYROID: ICD-10-CM

## 2018-07-09 DIAGNOSIS — Z13.220 SCREENING, LIPID: ICD-10-CM

## 2018-07-09 DIAGNOSIS — G35 MS (MULTIPLE SCLEROSIS) (HCC): ICD-10-CM

## 2018-07-09 DIAGNOSIS — N91.4 SECONDARY OLIGOMENORRHEA: ICD-10-CM

## 2018-07-09 PROCEDURE — 99396 PREV VISIT EST AGE 40-64: CPT | Performed by: NURSE PRACTITIONER

## 2018-07-09 ASSESSMENT — PATIENT HEALTH QUESTIONNAIRE - PHQ9: CLINICAL INTERPRETATION OF PHQ2 SCORE: 0

## 2018-07-09 NOTE — PROGRESS NOTES
CC: Annual Exam (And discuss periods)        HPI:     Ct presents today for the followin. MS (multiple sclerosis) (HCC)/Migraine without aura and without status migrainosus, not intractable  Currently followed by neurology, Dr. Belcher.  Overall her migraines are well controlled without any new symptoms.  She uses Maxalt for this as needed.  Recent brain MRI came back good in terms of lesions for MS.    3. Hypothyroidism due to acquired atrophy of thyroid  Compliant with her levothyroxine, she has no adverse reaction or problems taking this medication routinely.  Her last thyroid level was euthyroid drawn last month    4. IC (interstitial cystitis)  Followed by urology, Dr. Smalls.  She is doing cystoscopy today.  He plans on keeping her on Kike on.  She is doing well overall.    5. Secondary oligomenorrhea  Stating she had a concern because a.  Is been slightly irregular.  She states that it has been about 2+ months since her last menstruation.  She has had some memory issues more like lack of concentration however denies any hot flashes.        Current Outpatient Prescriptions   Medication Sig Dispense Refill   • acyclovir (ZOVIRAX) 400 MG tablet TAKE 2 TABLETS BY MOUTH TWICE A DAY FOR 5 DAYS 60 Tab 11   • Teriflunomide (AUBAGIO) 14 MG Tab Take 1 Tab by mouth every day. 30 Tab 11   • amitriptyline (ELAVIL) 25 MG Tab TAKE 1/2 TO 1 TABLET BY MOUTH AT BEDTIME AS NEEDED 90 Tab 3   • phenazopyridine (PYRIDIUM) 200 MG Tab Take 1 Tab by mouth 3 times a day as needed. 6 Tab 11   • pentosan (ELMIRON) 100 MG Cap Take 1 Cap by mouth 3 times a day before meals. 270 Cap 3   • montelukast (SINGULAIR) 10 MG Tab TAKE 1 TABLET BY ORAL ROUTE EVERY DAY IN THE EVENING 90 Tab 3   • rizatriptan (MAXALT) 10 MG tablet TAKE 1 TAB BY MOUTH ONCE FOR MIGRAINE FOR UP TO 1 DOSE. 10 Tab 6   • Misc. Devices Misc 1 Units by Does not apply route every day. 1 Units 0   • levothyroxine (SYNTHROID) 25 MCG Tab TAKE 1 TAB BY MOUTH EVERY  "MORNING ON AN EMPTY STOMACH. 90 Tab 2   • mometasone (NASONEX) 50 MCG/ACT nasal spray Spray 2 Sprays in nose 2 Times a Day. 1 Inhaler 11   • vitamin D (CHOLECALCIFEROL) 1000 UNIT Tab Take  by mouth every day.     • cetirizine (ZYRTEC) 10 MG Tab Take 10 mg by mouth every day.       No current facility-administered medications for this visit.      Social History   Substance Use Topics   • Smoking status: Never Smoker   • Smokeless tobacco: Never Used   • Alcohol use Yes      Comment: social     I reviewed patients allergies, problem list and medications today in [facility].    ROS: Any/all pertinent positives listed in the HPI, otherwise all others reviewed are negative today.      /60   Pulse 80   Temp 36.7 °C (98 °F)   Resp 14   Ht 1.626 m (5' 4\")   Wt 64.4 kg (142 lb)   LMP 04/25/2018   SpO2 99%   Breastfeeding? No   BMI 24.37 kg/m²     Exam:    Gen: Alert and oriented, No apparent distress. WDWN  Psych: A+Ox3, normal affect and mood  Skin: Warm, dry and intact. Good turgor   No rashes in visible areas.  Eye: Conjunctiva clear, lids normal  ENMT: Lips without lesions, good dentition  Neck: No Lymphadenopathy, Thyromegaly, Bruits.   Trachea midline, no masses  Lungs: Clear to auscultation bilaterally, no rales or rhonchi   Unlabored respiratory effort.   CV: Regular rate and rhythm, S1, S2. No murmurs.   No Edema  Abd: Soft non tender, non distended. Normal active bowel sounds.    No Hepatosplenomegaly, No pulsatile masses.         Assessment and Plan.   47 y.o. female with the following issues.    1. MS (multiple sclerosis) (HCC)/Migraine without aura and without status migrainosus, not intractable  Stable and doing well.  Continue care with neurology  - COMP METABOLIC PANEL; Future    2. Hypothyroidism due to acquired atrophy of thyroid  Stable continue medication.  Will recheck levels in around a year.  We did discuss given she is on such a low-dose we can always do a trial without medication if she " chooses for now we will not however.  - TSH; Future    4. IC (interstitial cystitis)  Stable continue medication and care with urology    5. Secondary oligomenorrhea  Reassurance.  We will continue to watch.  This may be a perimenopausal type symptom which we discussed to be fairly normal.  We discussed other common symptoms that can occur.  We discussed that she is having heavy menses, which was defined to the patient, she is to notify the office we can prevent anemia.    6. Screening, lipid  Ordered as she is required for lipid panel with her biometrics for work  - LIPID PROFILE; Future

## 2018-07-25 ENCOUNTER — HOSPITAL ENCOUNTER (OUTPATIENT)
Dept: LAB | Facility: MEDICAL CENTER | Age: 48
End: 2018-07-25
Attending: NURSE PRACTITIONER
Payer: COMMERCIAL

## 2018-07-25 DIAGNOSIS — G43.009 MIGRAINE WITHOUT AURA AND WITHOUT STATUS MIGRAINOSUS, NOT INTRACTABLE: ICD-10-CM

## 2018-07-25 LAB
ALBUMIN SERPL BCP-MCNC: 4.7 G/DL (ref 3.2–4.9)
ALP SERPL-CCNC: 48 U/L (ref 30–99)
ALT SERPL-CCNC: 34 U/L (ref 2–50)
AST SERPL-CCNC: 25 U/L (ref 12–45)
BILIRUB CONJ SERPL-MCNC: <0.1 MG/DL (ref 0.1–0.5)
BILIRUB INDIRECT SERPL-MCNC: NORMAL MG/DL (ref 0–1)
BILIRUB SERPL-MCNC: 0.4 MG/DL (ref 0.1–1.5)
PROT SERPL-MCNC: 6.7 G/DL (ref 6–8.2)

## 2018-07-25 PROCEDURE — 80076 HEPATIC FUNCTION PANEL: CPT

## 2018-07-25 PROCEDURE — 36415 COLL VENOUS BLD VENIPUNCTURE: CPT

## 2018-07-26 RX ORDER — RIZATRIPTAN BENZOATE 10 MG/1
TABLET ORAL
Qty: 10 TAB | Refills: 11 | Status: SHIPPED | OUTPATIENT
Start: 2018-07-26 | End: 2019-07-18 | Stop reason: SDUPTHER

## 2018-08-08 ENCOUNTER — HOSPITAL ENCOUNTER (OUTPATIENT)
Facility: MEDICAL CENTER | Age: 48
End: 2018-08-08
Attending: NURSE PRACTITIONER
Payer: COMMERCIAL

## 2018-08-08 DIAGNOSIS — R19.7 DIARRHEA, UNSPECIFIED TYPE: ICD-10-CM

## 2018-08-08 PROCEDURE — 87045 FECES CULTURE AEROBIC BACT: CPT

## 2018-08-08 PROCEDURE — 87899 AGENT NOS ASSAY W/OPTIC: CPT

## 2018-08-08 PROCEDURE — 87493 C DIFF AMPLIFIED PROBE: CPT

## 2018-08-08 PROCEDURE — 87046 STOOL CULTR AEROBIC BACT EA: CPT

## 2018-08-09 DIAGNOSIS — R19.7 DIARRHEA, UNSPECIFIED TYPE: ICD-10-CM

## 2018-08-09 LAB
C DIFF DNA SPEC QL NAA+PROBE: NEGATIVE
C DIFF TOX GENS STL QL NAA+PROBE: NEGATIVE

## 2018-08-10 LAB
E COLI SXT1+2 STL IA: NORMAL
SIGNIFICANT IND 70042: NORMAL
SITE SITE: NORMAL
SOURCE SOURCE: NORMAL

## 2018-08-12 LAB
BACTERIA STL CULT: NORMAL
E COLI SXT1+2 STL IA: NORMAL
SIGNIFICANT IND 70042: NORMAL
SITE SITE: NORMAL
SOURCE SOURCE: NORMAL

## 2018-08-14 ENCOUNTER — OFFICE VISIT (OUTPATIENT)
Dept: MEDICAL GROUP | Facility: MEDICAL CENTER | Age: 48
End: 2018-08-14
Payer: COMMERCIAL

## 2018-08-14 VITALS
TEMPERATURE: 98.5 F | DIASTOLIC BLOOD PRESSURE: 70 MMHG | SYSTOLIC BLOOD PRESSURE: 112 MMHG | RESPIRATION RATE: 14 BRPM | OXYGEN SATURATION: 99 % | HEART RATE: 74 BPM | HEIGHT: 64 IN

## 2018-08-14 DIAGNOSIS — R19.7 DIARRHEA, UNSPECIFIED TYPE: ICD-10-CM

## 2018-08-14 PROCEDURE — 99214 OFFICE O/P EST MOD 30 MIN: CPT | Performed by: NURSE PRACTITIONER

## 2018-08-14 NOTE — PROGRESS NOTES
CC: Follow-Up (GI issues continues; )        HPI:     Ct presents today for the followin. Diarrhea, unspecified type  Here today following up for loose watery stools, urgency and frequency of stools starting on .  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  She denies any recent travel, no recent antibiotics.  Stools are not black or bloody.  No associated fevers.  She will have some associated mild lower abdominal cramping which is often relieved by a bowel movement however no other pain per se.  No nausea or vomiting occasional disinterest in food or extremely mild nausea with eating.    She did do stool studies last week which showed she was negative for C. difficile and a negative stool culture.    Since that time she has been working on doing a bland/brat diet which does help her symptoms a little bit.  Cutting coffee out of her diet has helped greatly       Current Outpatient Prescriptions   Medication Sig Dispense Refill   • rizatriptan (MAXALT) 10 MG tablet TAKE 1 TAB BY MOUTH ONCE FOR MIGRAINE FOR UP TO 1 DOSE. 10 Tab 11   • acyclovir (ZOVIRAX) 400 MG tablet TAKE 2 TABLETS BY MOUTH TWICE A DAY FOR 5 DAYS 60 Tab 11   • Teriflunomide (AUBAGIO) 14 MG Tab Take 1 Tab by mouth every day. 30 Tab 11   • amitriptyline (ELAVIL) 25 MG Tab TAKE 1/2 TO 1 TABLET BY MOUTH AT BEDTIME AS NEEDED 90 Tab 3   • phenazopyridine (PYRIDIUM) 200 MG Tab Take 1 Tab by mouth 3 times a day as needed. 6 Tab 11   • pentosan (ELMIRON) 100 MG Cap Take 1 Cap by mouth 3 times a day before meals. 270 Cap 3   • montelukast (SINGULAIR) 10 MG Tab TAKE 1 TABLET BY ORAL ROUTE EVERY DAY IN THE EVENING 90 Tab 3   • Misc. Devices Misc 1 Units by Does not apply route every day. 1 Units 0   • levothyroxine (SYNTHROID) 25 MCG Tab TAKE 1 TAB BY MOUTH EVERY MORNING ON AN EMPTY STOMACH. 90 Tab 2   • mometasone (NASONEX) 50 MCG/ACT nasal spray Spray 2 Sprays in nose 2 Times a Day. 1 Inhaler 11   • vitamin D (CHOLECALCIFEROL) 1000  "UNIT Tab Take  by mouth every day.     • cetirizine (ZYRTEC) 10 MG Tab Take 10 mg by mouth every day.       No current facility-administered medications for this visit.      Social History   Substance Use Topics   • Smoking status: Never Smoker   • Smokeless tobacco: Never Used   • Alcohol use Yes      Comment: social     I reviewed patients allergies, problem list and medications today in [facility].    ROS: Any/all pertinent positives listed in the HPI, otherwise all others reviewed are negative today.      /70   Pulse 74   Temp 36.9 °C (98.5 °F)   Resp 14   Ht 1.626 m (5' 4\")   SpO2 99%     Exam:    Gen: Alert and oriented, No apparent distress. WDWN  Psych: A+Ox3, normal affect and mood  Skin: Warm, dry and intact. Good turgor   No rashes in visible areas.  Eye: Conjunctiva clear, lids normal  ENMT: Lips without lesions, good dentition  Lungs: Clear to auscultation bilaterally, no rales or rhonchi   Unlabored respiratory effort.   CV: Regular rate and rhythm, S1, S2. No murmurs.   No Edema  Abd: Soft non tender, non distended. Normal active bowel sounds.    No Hepatosplenomegaly, No pulsatile masses.   Ext: No clubbing, cyanosis, edema.       Assessment and Plan.   48 y.o. female with the following issues.    1. Diarrhea, unspecified type  Stable.  Possible IBS with diarrhea.  She was given information on IBS, Fahd map diet we did discuss in detail food and diet.  She may slowly start adding in fiber.  If no improvement will refer her to gastroenterology referral was placed today.  - REFERRAL TO GASTROENTEROLOGY      "

## 2018-08-16 ENCOUNTER — TELEPHONE (OUTPATIENT)
Dept: NEUROLOGY | Facility: MEDICAL CENTER | Age: 48
End: 2018-08-16

## 2018-08-16 NOTE — TELEPHONE ENCOUNTER
----- Message from Rogers Nieves, Med Ass't sent at 8/16/2018  9:21 AM PDT -----  Regarding: FW: Non-Urgent Medical Question  Contact: [phone]      ----- Message -----  From: Ct Montalvo  Sent: 8/14/2018   7:09 PM  To: Neurology Mas  Subject: Non-Urgent Medical Question                      Hi,  I have been having gut issues since 8/5, specifically diarrhea multiple times per day.  I have communicated with my primary who did a few stool tests ( which you can see on my chart if you like) but all came back negative.  I saw her today and she isn't sure but is guessing IBS.  My stomach/gut has always been like steel.  Nothing gets it upset other than loose stools in the morning because of  taking the elmiron at night.  I've been on the BRAT diet or a modified version thereof to no avail. She is referring me to a gastroenterologist but just thought I'd check in with you.  Could this be a result of the aubagio or part of the inflammatory process of MS?    ThanksGeorgiana

## 2018-08-16 NOTE — TELEPHONE ENCOUNTER
The Aubagio certainly could be a cause of the diarrhea, especially if she never had this problem before hand. The only way to know is that she can stop her drug briefly for a week or more to see if the diarrhea resolves. She will not lose efficacy in that short interval.

## 2018-08-19 RX ORDER — LEVOTHYROXINE SODIUM 0.03 MG/1
25 TABLET ORAL
Qty: 90 TAB | Refills: 2 | Status: SHIPPED | OUTPATIENT
Start: 2018-08-19 | End: 2019-04-22

## 2018-08-21 ENCOUNTER — TELEPHONE (OUTPATIENT)
Dept: NEUROLOGY | Facility: MEDICAL CENTER | Age: 48
End: 2018-08-21

## 2018-08-21 NOTE — TELEPHONE ENCOUNTER
Tell the patient she will need to be on a different type of disease modifying treatment. I will let her know what I think will probably be the best option for her.

## 2018-08-21 NOTE — TELEPHONE ENCOUNTER
"----- Message from Rogers ESTRADA Ezequiel, Med Ass't sent at 8/21/2018  3:19 PM PDT -----  Regarding: FW:Aubagio  Contact: [phone]      ----- Message -----  From: Ct Montalvo  Sent: 8/21/2018  10:07 AM  To: Rogers ESTRADA Ezequiel, Med Ass't  Subject: RE:Aubagio                                       Hi Doctor Karsten,  I did as instructed and stopped the aubagio on Saturday, August 18th and within 24 hours my trouble was gone.  I am doing totally fine now.  Since the aubagio has been so effective other than that, should I consider changing my diet to address the intestinal symptoms or is it actually doing me harm, that over time could cause lasting problems?  Should I go back on it to make sure the aubagio was the cause?  If it is your recommendation to switch meds, what would you suggest?  I've heard negative opinions of the injectibles causing injection site pain and scarring over time.  Are the IV options better?  I'll defer to your professional judgment.  At this time, I've been off aubagio 4 days.  Thank you,  Georgiana      ----- Message -----  From: Rogers ESTRADA Ezequeil, Med Ass't  Sent: 8/17/2018 10:16 AM PDT  To: Ct Montalvo  Subject: Aubagio  Greg Fofana-    Per Dr. Shelley:    \"The Aubagio certainly could be a cause of the diarrhea, especially if she never had this problem before hand. The only way to know is that she can stop her drug briefly for a week or more to see if the diarrhea resolves. She will not lose efficacy in that short interval.\"    "

## 2018-08-29 ENCOUNTER — PATIENT MESSAGE (OUTPATIENT)
Dept: NEUROLOGY | Facility: MEDICAL CENTER | Age: 48
End: 2018-08-29

## 2018-08-29 DIAGNOSIS — G35 MS (MULTIPLE SCLEROSIS) (HCC): ICD-10-CM

## 2018-08-29 NOTE — TELEPHONE ENCOUNTER
"----- Message from Rogers ESTRADA Ezequiel, Med Ass't sent at 8/28/2018  9:09 AM PDT -----  Regarding: FW:Reply from neurology  Contact: [phone]  Were you going to change her medication?  ----- Message -----  From: Ct Montalvo  Sent: 8/27/2018   3:53 PM  To: Rogers SAEN Ezequiel, Med Ass't  Subject: RE:Reply from neurology                          Hi,   Just touching base about a new MS med to replace the aubagio.  I am on day 9 without any meds.  I appreciate your hard work on my behalf.  Sincerely,  Georgiana Montalvo  ----- Message -----  From: Rogers ESTRADA Ezequiel, Med Ass't  Sent: 8/23/2018  2:37 PM PDT  To: Ct Montalvo  Subject: Reply from neurology  Hi Ct-    Per Dr. Shelley:    \"Tell the patient she will need to be on a different type of disease modifying treatment. I will let her know what I think will probably be the best option for her.\"      "

## 2018-08-29 NOTE — TELEPHONE ENCOUNTER
I would recommend Tecfidera or Gylenia. The latter has less of a GI side effect profile, but she does need some more pre-treatment evaluations including ophthalmologist and also observation for the day with her first dose because of the bradycardia that can occur. Tecfidera is easier to monitor but the side effects include flushing sensations which can be a little bit of a difficulty. Both are very effective.

## 2018-08-31 ENCOUNTER — TELEPHONE (OUTPATIENT)
Dept: NEUROLOGY | Facility: MEDICAL CENTER | Age: 48
End: 2018-08-31

## 2018-09-01 NOTE — TELEPHONE ENCOUNTER
"----- Message from Rogers Nieves, Med Ass't sent at 8/31/2018  3:18 PM PDT -----  Regarding: FW: Non-Urgent Medical Question  Contact: [phone]      ----- Message -----  From: Ct Montalvo  Sent: 8/29/2018   3:54 PM  To: Rogers ESTRADA Ezequiel, Med Ass't  Subject: Non-Urgent Medical Question                      I feel like I can't wait to see an ophthalmologist - this could be a 6 week wait to get in to see someone.  I don't feel like I could make it without going into a flare. Right?  Am I missing something?    I'm not thrilled with the possible flushing symptom of the Tecfidera but it feels like my only option if I wanted to get started right away and reduce the chance of flare/further damage.    This is a very hard and stressful decision.  Which one would you take or which one would you give your wife?    Georgiana  ----- Message -----  From: Rogers ESTRADA Ezequiel, Med Ass't  Sent: 8/29/2018  3:37 PM PDT  To: Ct Montalvo  Subject: RE: Non-Urgent Medical Question  Greg Fofana-    Per Dr. Shelley:    \"I would recommend Tecfidera or Gylenia. The latter has less of a GI side effect profile, but she does need some more pre-treatment evaluations including ophthalmologist and also observation for the day with her first dose because of the bradycardia that can occur. Tecfidera is easier to monitor but the side effects include flushing sensations which can be a little bit of a difficulty. Both are very effective.\"    Did you have a preference on which one you wanted to try?     ----- Message -----     From: Ct Montalvo     Sent: 8/29/2018  5:06 AM PDT       To: Jean Marie Shelley M.D.  Subject: Non-Urgent Medical Question    Hi,  I’m getting very symptomatic & thus very anxious. After yoga yesterday, my legs felt like  I’d run 10 miles & are no better this morning. My left leg is also getting very numb & I’m back to having to walk slowly to avoid tripping.   Please advise as soon as possible. I’m so worried I’m " headed into a  flare.   Georgiana Montalvo

## 2018-09-01 NOTE — TELEPHONE ENCOUNTER
Please let the patient know that we can always use IV steroids to help control the symptoms that might be progressing. Having just stop the Aubagio, I don't think this is rebound phenomena from discontinuing the drug. Still, this could be a relapse of her disease. She should start some type of disease modifying treatment, regardless, sooner rather than later. Tecfidera is always the easiest one to be started on. I can forward a prescription to the pharmacy, some blood work will be necessary, but it won't entail seeing an ophthalmologist,. She can do this without having to see me ahead of time. Given how far out my next appointment is, it would be easier and sooner for her to see an ophthalmologist and start Gylenia.

## 2018-09-16 DIAGNOSIS — G35 MS (MULTIPLE SCLEROSIS) (HCC): ICD-10-CM

## 2018-09-17 RX ORDER — MOMETASONE FUROATE 50 UG/1
SPRAY, METERED NASAL
Qty: 1 INHALER | Refills: 11 | Status: SHIPPED | OUTPATIENT
Start: 2018-09-17 | End: 2019-10-07 | Stop reason: SDUPTHER

## 2018-09-17 RX ORDER — MODAFINIL 200 MG/1
TABLET ORAL
Qty: 60 TAB | Refills: 0 | Status: SHIPPED | OUTPATIENT
Start: 2018-09-17 | End: 2018-10-22 | Stop reason: SDUPTHER

## 2018-10-19 ENCOUNTER — HOSPITAL ENCOUNTER (OUTPATIENT)
Dept: LAB | Facility: MEDICAL CENTER | Age: 48
End: 2018-10-19
Attending: PSYCHIATRY & NEUROLOGY
Payer: COMMERCIAL

## 2018-10-19 LAB
ALBUMIN SERPL BCP-MCNC: 4.6 G/DL (ref 3.2–4.9)
BUN SERPL-MCNC: 11 MG/DL (ref 8–22)
CALCIUM SERPL-MCNC: 9.9 MG/DL (ref 8.5–10.5)
CHLORIDE SERPL-SCNC: 104 MMOL/L (ref 96–112)
CO2 SERPL-SCNC: 28 MMOL/L (ref 20–33)
CREAT SERPL-MCNC: 0.77 MG/DL (ref 0.5–1.4)
GLUCOSE SERPL-MCNC: 113 MG/DL (ref 65–99)
PHOSPHATE SERPL-MCNC: 4.1 MG/DL (ref 2.5–4.5)
POTASSIUM SERPL-SCNC: 4.2 MMOL/L (ref 3.6–5.5)
SODIUM SERPL-SCNC: 137 MMOL/L (ref 135–145)

## 2018-10-19 PROCEDURE — 80069 RENAL FUNCTION PANEL: CPT

## 2018-10-19 PROCEDURE — 36415 COLL VENOUS BLD VENIPUNCTURE: CPT

## 2018-10-22 ENCOUNTER — OFFICE VISIT (OUTPATIENT)
Dept: NEUROLOGY | Facility: MEDICAL CENTER | Age: 48
End: 2018-10-22
Payer: COMMERCIAL

## 2018-10-22 VITALS
WEIGHT: 139 LBS | HEIGHT: 64 IN | RESPIRATION RATE: 16 BRPM | HEART RATE: 98 BPM | TEMPERATURE: 99.1 F | BODY MASS INDEX: 23.73 KG/M2 | OXYGEN SATURATION: 98 % | DIASTOLIC BLOOD PRESSURE: 76 MMHG | SYSTOLIC BLOOD PRESSURE: 128 MMHG

## 2018-10-22 DIAGNOSIS — E03.4 HYPOTHYROIDISM DUE TO ACQUIRED ATROPHY OF THYROID: ICD-10-CM

## 2018-10-22 DIAGNOSIS — G43.009 MIGRAINE WITHOUT AURA AND WITHOUT STATUS MIGRAINOSUS, NOT INTRACTABLE: ICD-10-CM

## 2018-10-22 DIAGNOSIS — G35 MS (MULTIPLE SCLEROSIS) (HCC): Primary | ICD-10-CM

## 2018-10-22 PROCEDURE — 99213 OFFICE O/P EST LOW 20 MIN: CPT | Performed by: PSYCHIATRY & NEUROLOGY

## 2018-10-22 RX ORDER — MODAFINIL 200 MG/1
200 TABLET ORAL DAILY
Qty: 90 TAB | Refills: 3 | Status: SHIPPED | OUTPATIENT
Start: 2018-10-22 | End: 2019-02-28 | Stop reason: SDUPTHER

## 2018-10-22 ASSESSMENT — ENCOUNTER SYMPTOMS
WEIGHT LOSS: 0
LOSS OF CONSCIOUSNESS: 0
SENSORY CHANGE: 1
MEMORY LOSS: 0
TINGLING: 0
INSOMNIA: 0
TREMORS: 0
FALLS: 0

## 2018-10-22 NOTE — PROGRESS NOTES
Subjective:      Ct Montalvo is a 48 y.o. female who presents with her  Hemanth, as always, for follow-up, with history of mild MS and migraine without aura, now complaining of a noticeable loss of energy and increased fatigue over the last several weeks.     HPI    MS: Since last seen, she denies any episodes suggestive of actual disease relapse. Though her fatigue is still problematic, she uses Provigil 200 mg daily with good benefit. She is sleeping well. She denies any changes with urinary function problems, still on Elavil to help with the urgency, she denies any symptoms suggestive of reactivation of her cystitis. She denies constrictive sensations across the chest or Lhermitte's phenomena. Walking and balance are stable. Vision has not been adversely affected, she denies focal motor or sensory distortions.    Her last liver profile from June of this year was completely normal. Last week a BMP was drawn, remarkable for an elevated glucose of 113, typically they're in the range of 70-90. MRI of the brain and thoracic cords were both done, with and without contrast, revealing stable burden of disease without enhancement in the latter, the former revealing the same number of lesions in the white matter and subcortical region bilaterally. There is some mild atrophy, no evidence of black holes or enhancement. This is unchanged from her study of one year prior.    Migraine: Stable, she has Maxalt to use as needed, she has not been overusing the drug.    She was told to stop her Synthroid about 4 months ago, she has not had a follow-up thyroid function done, she denies issues of edema, alopecia, change and thickening of skin texture, loss of appetite, weight increase, dizziness or syncope, etc.    Medical, surgical and family histories are reviewed, there are no new drug allergies. She is on Aubagio 14 mg daily, Provigil 200 mg daily, Elavil 25 mg daily at bedtime, Singulair, Maxalt 10 mg when necessary,  "the rest as per the electronic health record, noncontributory from my standpoint.    Review of Systems   Constitutional: Positive for malaise/fatigue. Negative for weight loss.   Musculoskeletal: Negative for falls.   Skin: Negative for itching.   Neurological: Positive for sensory change. Negative for tingling, tremors and loss of consciousness.   Psychiatric/Behavioral: Negative for memory loss. The patient does not have insomnia.    All other systems reviewed and are negative.       Objective:     /76 (BP Location: Left arm, Patient Position: Sitting, BP Cuff Size: Adult)   Pulse 98   Temp 37.3 °C (99.1 °F) (Temporal)   Resp 16   Ht 1.626 m (5' 4.02\")   Wt 63 kg (139 lb)   SpO2 98%   BMI 23.85 kg/m²      Physical Exam    She appears in no acute distress. Her vital signs are stable. There is no malar rash, jaw or temporal tenderness, or jaw claudication. The neck is supple, range of motion is full, Lhermitte's phenomena is absent. Cardiac evaluation remains normal, with a regular rhythm.    She is fully oriented, there is no focal cognitive or language deficit. PERRLA/EOMI, visual fields are full, facial movement symmetric, there is no bulbar dysfunction with the tongue and uvula midline, sensory exam is intact to light touch and temperature bilaterally, shoulder shrug and head rotation are intact.    Musculoskeletal exam reveals no tremor, asterixis, or drift. Strength is intact in all 4 extremities, reflexes are present at all points without asymmetries, both toes are downgoing. There is no ankle clonus.    She walks with normal station, heel, toe, and tandem walking are done easily. There is no appendicular dystaxia, fine motor control is intact in all 4 extremities with normal amplitude and frequencies throughout.    Sensory exam is intact to vibration and temperature. Romberg is absent.     Assessment/Plan:     1. MS (multiple sclerosis) (HCC)  Clinically and radiographically stable, we will " continue Aubagio. She has been on the drug for over one year, typically liver dysfunction occurs within the first 6 months of starting the drug, so she no longer requires monthly liver panel checks, this can be done once a year. As for malaise, we will continue the Provigil, I will check a thyroid function just in case we are seeing changes now that she been off her Synthroid for upwards of 4 months. She will notify Ms. Mata, her PCP in this regard. I did recommend she get back on her thyroid supplement if her TSH values are elevated. Assuming she remains stable, another MRI scan will be done in June, 2019. We will follow-up otherwise in 6 months.    - modafinil (PROVIGIL) 200 MG Tab; Take 1 Tab by mouth every day.  Dispense: 90 Tab; Refill: 3    2. Migraine without aura and without status migrainosus, not intractable  Stable, she has her Maxalt 10 mg to use as needed.    3. Hypothyroidism due to acquired atrophy of thyroid  Again, hopefully this will be an explanation of the increased malaise and fatigue she has noticed over the last several weeks. She lacks other symptoms that might be seen with hypothyroidism, but again this is fairly early. She will be calling her PCP in this regard, regardless. I am disinclined to believe that this fairly sudden increase in fatigue is a manifestation of her MS, though it is certainly possible.    - TSH; Future    Time: 20 minutes spent face-to-face for exam, review, discussion, and education, of this over 50% of the time spent counseling and coordinating care

## 2018-10-22 NOTE — Clinical Note
Tena See my note specifically, but I am a little concerned about Ct's sudden increase in her malaise and fatigue, though I'm disinclined to believe it is her MS, you had gotten her off Synthroid about 4 months ago, so I took the liberty of ordering a TSH, so she may be calling you especially if the TSH value is elevated. In that circumstance I recommended she restart the Synthroid but talk with you about specifics. Jean Marie

## 2018-11-01 ENCOUNTER — NON-PROVIDER VISIT (OUTPATIENT)
Dept: URGENT CARE | Facility: PHYSICIAN GROUP | Age: 48
End: 2018-11-01
Payer: COMMERCIAL

## 2018-11-01 ENCOUNTER — HOSPITAL ENCOUNTER (OUTPATIENT)
Dept: LAB | Facility: MEDICAL CENTER | Age: 48
End: 2018-11-01
Attending: PSYCHIATRY & NEUROLOGY
Payer: COMMERCIAL

## 2018-11-01 DIAGNOSIS — Z23 NEED FOR VACCINATION: Primary | ICD-10-CM

## 2018-11-01 DIAGNOSIS — E03.4 HYPOTHYROIDISM DUE TO ACQUIRED ATROPHY OF THYROID: ICD-10-CM

## 2018-11-01 LAB — TSH SERPL DL<=0.005 MIU/L-ACNC: 2.39 UIU/ML (ref 0.38–5.33)

## 2018-11-01 PROCEDURE — 84443 ASSAY THYROID STIM HORMONE: CPT

## 2018-11-01 PROCEDURE — 36415 COLL VENOUS BLD VENIPUNCTURE: CPT

## 2018-11-01 PROCEDURE — 90471 IMMUNIZATION ADMIN: CPT | Performed by: FAMILY MEDICINE

## 2018-11-01 PROCEDURE — 90686 IIV4 VACC NO PRSV 0.5 ML IM: CPT | Performed by: FAMILY MEDICINE

## 2018-12-03 ENCOUNTER — HOSPITAL ENCOUNTER (OUTPATIENT)
Dept: LAB | Facility: MEDICAL CENTER | Age: 48
End: 2018-12-03
Attending: OBSTETRICS & GYNECOLOGY
Payer: COMMERCIAL

## 2018-12-03 LAB
ESTRADIOL SERPL-MCNC: 103 PG/ML
FSH SERPL-ACNC: 24.3 MIU/ML

## 2018-12-03 PROCEDURE — 36415 COLL VENOUS BLD VENIPUNCTURE: CPT

## 2018-12-03 PROCEDURE — 83001 ASSAY OF GONADOTROPIN (FSH): CPT

## 2018-12-03 PROCEDURE — 82670 ASSAY OF TOTAL ESTRADIOL: CPT

## 2018-12-30 RX ORDER — MONTELUKAST SODIUM 10 MG/1
TABLET ORAL
Qty: 90 TAB | Refills: 3 | Status: SHIPPED | OUTPATIENT
Start: 2018-12-30 | End: 2019-10-11 | Stop reason: SDUPTHER

## 2019-01-02 ENCOUNTER — PATIENT MESSAGE (OUTPATIENT)
Dept: MEDICAL GROUP | Facility: MEDICAL CENTER | Age: 49
End: 2019-01-02

## 2019-01-02 DIAGNOSIS — G89.29 CHRONIC LOW BACK PAIN WITHOUT SCIATICA, UNSPECIFIED BACK PAIN LATERALITY: ICD-10-CM

## 2019-01-02 DIAGNOSIS — M54.50 CHRONIC LOW BACK PAIN WITHOUT SCIATICA, UNSPECIFIED BACK PAIN LATERALITY: ICD-10-CM

## 2019-01-02 DIAGNOSIS — M25.561 RIGHT KNEE PAIN, UNSPECIFIED CHRONICITY: ICD-10-CM

## 2019-01-08 ENCOUNTER — HOSPITAL ENCOUNTER (OUTPATIENT)
Dept: RADIOLOGY | Facility: MEDICAL CENTER | Age: 49
End: 2019-01-08
Attending: NURSE PRACTITIONER
Payer: COMMERCIAL

## 2019-01-08 DIAGNOSIS — Z12.39 SCREENING BREAST EXAMINATION: ICD-10-CM

## 2019-01-08 PROCEDURE — 77063 BREAST TOMOSYNTHESIS BI: CPT

## 2019-02-07 DIAGNOSIS — G43.109 MIGRAINE WITH AURA AND WITHOUT STATUS MIGRAINOSUS, NOT INTRACTABLE: ICD-10-CM

## 2019-02-08 RX ORDER — ZOLMITRIPTAN 5 MG/1
SPRAY, METERED NASAL
Qty: 6 EACH | Refills: 4 | Status: SHIPPED | OUTPATIENT
Start: 2019-02-08 | End: 2019-09-27 | Stop reason: SDUPTHER

## 2019-02-28 DIAGNOSIS — G35 MS (MULTIPLE SCLEROSIS) (HCC): ICD-10-CM

## 2019-02-28 RX ORDER — MODAFINIL 200 MG/1
200 TABLET ORAL DAILY
Qty: 90 TAB | Refills: 1 | Status: SHIPPED | OUTPATIENT
Start: 2019-02-28 | End: 2019-06-01 | Stop reason: SDUPTHER

## 2019-04-03 ENCOUNTER — OFFICE VISIT (OUTPATIENT)
Dept: NEUROLOGY | Facility: MEDICAL CENTER | Age: 49
End: 2019-04-03
Payer: COMMERCIAL

## 2019-04-03 VITALS
WEIGHT: 139 LBS | TEMPERATURE: 98.2 F | HEIGHT: 64 IN | OXYGEN SATURATION: 98 % | RESPIRATION RATE: 16 BRPM | SYSTOLIC BLOOD PRESSURE: 124 MMHG | DIASTOLIC BLOOD PRESSURE: 72 MMHG | BODY MASS INDEX: 23.73 KG/M2 | HEART RATE: 75 BPM

## 2019-04-03 DIAGNOSIS — G35 MS (MULTIPLE SCLEROSIS) (HCC): ICD-10-CM

## 2019-04-03 PROCEDURE — 99214 OFFICE O/P EST MOD 30 MIN: CPT | Performed by: PHYSICIAN ASSISTANT

## 2019-04-03 ASSESSMENT — PATIENT HEALTH QUESTIONNAIRE - PHQ9: CLINICAL INTERPRETATION OF PHQ2 SCORE: 0

## 2019-04-03 NOTE — PROGRESS NOTES
Cc:  - MA    Established patient of Dr. Shelley  who suffers from MS  Here today for completion of paperwork for FMLA/disability/related to their condition.    They report to me that they are unable to continue working full time due to the work load and stress and their severe fatigue. Plan is to work 2 days week and schedule a sub with sick leave to finish out term and then retire.    Review of the medical chart and interviewing patient, I completed paperwork and it is scanned into media.     RTC as previously scheduled.    Total time with this visit: 25    Minutes face-to-face with patient. More than 50% of this visit was spent reviewing medical chart and speaking with the patient about their condition and how it affects their ability to do their job.

## 2019-04-22 ENCOUNTER — OFFICE VISIT (OUTPATIENT)
Dept: NEUROLOGY | Facility: MEDICAL CENTER | Age: 49
End: 2019-04-22
Payer: COMMERCIAL

## 2019-04-22 VITALS
BODY MASS INDEX: 23.8 KG/M2 | HEART RATE: 92 BPM | WEIGHT: 139.4 LBS | DIASTOLIC BLOOD PRESSURE: 70 MMHG | TEMPERATURE: 98.4 F | SYSTOLIC BLOOD PRESSURE: 122 MMHG | OXYGEN SATURATION: 99 % | HEIGHT: 64 IN

## 2019-04-22 DIAGNOSIS — G43.009 MIGRAINE WITHOUT AURA AND WITHOUT STATUS MIGRAINOSUS, NOT INTRACTABLE: ICD-10-CM

## 2019-04-22 DIAGNOSIS — G35 MS (MULTIPLE SCLEROSIS) (HCC): Primary | ICD-10-CM

## 2019-04-22 PROCEDURE — 99214 OFFICE O/P EST MOD 30 MIN: CPT | Performed by: PSYCHIATRY & NEUROLOGY

## 2019-04-22 ASSESSMENT — ENCOUNTER SYMPTOMS
CONSTIPATION: 0
DEPRESSION: 1
NERVOUS/ANXIOUS: 1
FOCAL WEAKNESS: 0
TINGLING: 1
MEMORY LOSS: 1
SENSORY CHANGE: 1
LOSS OF CONSCIOUSNESS: 0

## 2019-04-23 NOTE — PROGRESS NOTES
Subjective:      Ct Montalvo is a 48 y.o. female who presents with her  Hemanth, as always, for follow-up, with a history of MS and intractable migraine without aura.     HPI    MS: Since last seen, Ct states that she has had no attacks or episodes suggestive of disease relapse.  Her fatigue is still problematic, Provigil 200 mg daily has helped to a degree but only to a degree.  Fortunately, her recurrent cystitis has not remained problematic.    What she has noted more consistently is a cognitive slowing that has now gotten to the point where she is retiring early, FMLA and disability paperwork were completed through this office.  She is having much greater difficulty completing and assuming responsibility for the tasks that have been required of her.  It is slower to do these things, she feels she is being left behind in meetings.  She is having more word finding difficulties, she gets easily distracted, for today's appointment she actually drove to a different hospital, realized the mistake and was able to get to her appointment on time.  She may repeat herself while at home.  Her distractibility makes it more difficult for her to multitask though she can  where she leaves off and finish.  Routine and rote tasks can still be done accurately and with the same degree of alacrity.    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  She is feeling more downtrodden and anxious about all of the above, this makes it more difficult to mentally focus when she needs to.  She is eating well.    The occasional paresthesias involving the lower extremities, left greater than right, are happening less often.  She denies Lhermitte's phenomena.  Vision has been stable, she denies issues with bulbar function, swallowing difficulties, focal motor or cerebellar deficits, compressive sensations across the chest or Lhermitte's phenomena.    For her disease, she is on  "Provigil 200 mg daily, Elmiron, Aubagio 14 mg daily, as well as Elavil, Pyridium, vitamin D and calcium.    Migraine without aura: Now with Botox infusions, she does get good headache control though she is still using Zomig nasal spray in the evening when the headaches begin.    Medical, surgical and family histories are reviewed in the electronic health record, there are no new drug allergies.  She is on medications as above, in addition to Elavil 25 mg every evening, Singulair, Nasonex and Zyrtec.    Review of Systems   Constitutional: Positive for malaise/fatigue.   Gastrointestinal: Negative for constipation.   Genitourinary: Positive for frequency and urgency. Negative for hematuria.   Neurological: Positive for tingling and sensory change. Negative for focal weakness and loss of consciousness.   Psychiatric/Behavioral: Positive for depression and memory loss. The patient is nervous/anxious.    All other systems reviewed and are negative.       Objective:     /70   Pulse 92   Temp 36.9 °C (98.4 °F) (Temporal)   Ht 1.626 m (5' 4.02\")   Wt 63.2 kg (139 lb 6.4 oz)   SpO2 99%   BMI 23.91 kg/m²      Physical Exam    She appears in no acute distress.  Her vital signs are stable.  There is no malar rash.  The neck is supple, range of motion is full, Lhermitte's phenomena is absent.  Cardiac evaluation is unremarkable.    She is fully oriented, she has a little difficulty with multistep commands, but there is no aphasia, agnosia, apraxia, or inattention.  Her mood is euthymic, though she does become quite emotional, crying easily when having to recount the cognitive difficulties she is having.    PERRLA/EOMI, visual fields are full to finger counting on confrontation bilaterally, facial movements are symmetric, sensory exam is intact to temperature bilaterally, the tongue and uvula are midline without bulbar dysfunction, shoulder shrug and head rotation are intact and symmetric.    Musculoskeletal exam " reveals normal tone without tremor, asterixis, or drift.  Strength is 5/5 throughout, reflexes are present, brisk throughout, especially in the lower extremities, the left toe is equivocal, the right downgoing on plantar stimulation.    She walks easily, station is normal, arm swing is symmetric, heel, toe, and tandem walking can all be done easily.  There is no appendicular dystaxia and fine motor control with repetitive movements also is intact and symmetric in all 4 extremities with normal amplitude and frequencies.    Sensory exam is intact to vibration and temperature, Romberg is absent.     Assessment/Plan:     1. MS (multiple sclerosis) ([facility])  What she is describing from a cognitive standpoint is certainly documented in patients with MS, often cognitive symptoms can exceed the physical, visual and sensory deficits that patients have with their illness.  Quite often there is no direct correlation with the lesion burden and the degree of fatigue and cognitive symptomatology.  For her it is probably best that she takes the MCFP early on since she may not be able to handle the cognitive requirements with her present job moving forwards.    Unfortunately, her fatigue, the sleep deprivation she has, anxiety, etc. are all going to make the cognitive symptoms that she probably has from her illness appear worse.  Instead of adding medication, since she will be retiring in the next 2 months, I encouraged her to work through all of this and let see what happens 1 month after she has removed herself from her work circumstance.  We can still talk about medication afterwards but at least the gallo will be less muddied.  She understands all of the above.  MRI of the brain and thoracic cord will be ordered as follow-up, but also to see if there is a sudden increase in burden of disease, previous scans showing a very small number of lesions.  If there is a meaningful increase, we would probably need to change her to a  different DMT, we will call her with these results otherwise follow-up in the next several months.    - MR-BRAIN-WITH & W/O; Future  - MR-THORACIC SPINE-WITH & W/O; Future  - Renal Function Panel; Future    2. Migraine without aura and without status migrainosus, not intractable  Stable, the headaches themselves seem to be better controlled, chronic migraine headache is associated with cognitive symptomatology, fortunately I do not think this is the bigger issue for her.  She will continue her Botox.    Time: 25 minutes spent face-to-face for exam, review, discussion, and education, of this over 60% of the time spent face-to-face counseling and coordinating care surrounding all of the above issues.

## 2019-04-29 ENCOUNTER — HOSPITAL ENCOUNTER (OUTPATIENT)
Dept: LAB | Facility: MEDICAL CENTER | Age: 49
End: 2019-04-29
Attending: PSYCHIATRY & NEUROLOGY
Payer: COMMERCIAL

## 2019-04-29 DIAGNOSIS — G35 MS (MULTIPLE SCLEROSIS) (HCC): ICD-10-CM

## 2019-04-29 LAB
ALBUMIN SERPL BCP-MCNC: 4.7 G/DL (ref 3.2–4.9)
BUN SERPL-MCNC: 9 MG/DL (ref 8–22)
CALCIUM SERPL-MCNC: 9.6 MG/DL (ref 8.5–10.5)
CHLORIDE SERPL-SCNC: 104 MMOL/L (ref 96–112)
CO2 SERPL-SCNC: 27 MMOL/L (ref 20–33)
CREAT SERPL-MCNC: 0.58 MG/DL (ref 0.5–1.4)
GLUCOSE SERPL-MCNC: 101 MG/DL (ref 65–99)
PHOSPHATE SERPL-MCNC: 3.4 MG/DL (ref 2.5–4.5)
POTASSIUM SERPL-SCNC: 3.8 MMOL/L (ref 3.6–5.5)
SODIUM SERPL-SCNC: 140 MMOL/L (ref 135–145)

## 2019-04-29 PROCEDURE — 36415 COLL VENOUS BLD VENIPUNCTURE: CPT

## 2019-04-29 PROCEDURE — 80069 RENAL FUNCTION PANEL: CPT

## 2019-05-09 ENCOUNTER — HOSPITAL ENCOUNTER (OUTPATIENT)
Dept: RADIOLOGY | Facility: MEDICAL CENTER | Age: 49
End: 2019-05-09
Attending: PSYCHIATRY & NEUROLOGY
Payer: COMMERCIAL

## 2019-05-09 DIAGNOSIS — G35 MS (MULTIPLE SCLEROSIS) (HCC): ICD-10-CM

## 2019-05-09 PROCEDURE — 700117 HCHG RX CONTRAST REV CODE 255: Performed by: PSYCHIATRY & NEUROLOGY

## 2019-05-09 PROCEDURE — A9585 GADOBUTROL INJECTION: HCPCS | Performed by: PSYCHIATRY & NEUROLOGY

## 2019-05-09 PROCEDURE — 72157 MRI CHEST SPINE W/O & W/DYE: CPT

## 2019-05-09 PROCEDURE — 70553 MRI BRAIN STEM W/O & W/DYE: CPT

## 2019-05-09 RX ORDER — GADOBUTROL 604.72 MG/ML
6 INJECTION INTRAVENOUS ONCE
Status: COMPLETED | OUTPATIENT
Start: 2019-05-09 | End: 2019-05-09

## 2019-05-09 RX ADMIN — GADOBUTROL 6 ML: 604.72 INJECTION INTRAVENOUS at 16:02

## 2019-05-17 ENCOUNTER — TELEPHONE (OUTPATIENT)
Dept: NEUROLOGY | Facility: MEDICAL CENTER | Age: 49
End: 2019-05-17

## 2019-05-17 NOTE — TELEPHONE ENCOUNTER
"You are right, I would have called her had I been concerned, the scans are stable.  I also have no idea how to \"release\" records so they are available to patients.  "

## 2019-05-17 NOTE — TELEPHONE ENCOUNTER
Patient called for Dr. Shelley and is wondering if he is able [facility] She was also curious about the results but I did inform her that the doctor would go over the results at her next office visit. He would have called if they came back abnormal.

## 2019-05-20 RX ORDER — PENTOSAN POLYSULFATE SODIUM 100 MG/1
CAPSULE, GELATIN COATED ORAL
Qty: 270 CAP | Refills: 2 | Status: SHIPPED | OUTPATIENT
Start: 2019-05-20 | End: 2019-05-20 | Stop reason: SDUPTHER

## 2019-05-20 RX ORDER — AMITRIPTYLINE HYDROCHLORIDE 25 MG/1
TABLET, FILM COATED ORAL
Qty: 90 TAB | Refills: 3 | Status: SHIPPED | OUTPATIENT
Start: 2019-05-20 | End: 2019-05-20 | Stop reason: SDUPTHER

## 2019-05-20 RX ORDER — AMITRIPTYLINE HYDROCHLORIDE 25 MG/1
TABLET, FILM COATED ORAL
Qty: 90 TAB | Refills: 3 | Status: SHIPPED | OUTPATIENT
Start: 2019-05-20 | End: 2020-08-07

## 2019-05-31 DIAGNOSIS — G35 MS (MULTIPLE SCLEROSIS) (HCC): ICD-10-CM

## 2019-05-31 RX ORDER — TERIFLUNOMIDE 14 MG/1
1 TABLET, FILM COATED ORAL DAILY
Qty: 30 TAB | Refills: 11 | Status: SHIPPED | OUTPATIENT
Start: 2019-05-31 | End: 2020-12-22 | Stop reason: SDUPTHER

## 2019-06-01 DIAGNOSIS — G35 MS (MULTIPLE SCLEROSIS) (HCC): ICD-10-CM

## 2019-06-04 RX ORDER — MODAFINIL 200 MG/1
TABLET ORAL
Qty: 90 TAB | Refills: 1 | Status: SHIPPED | OUTPATIENT
Start: 2019-06-04 | End: 2019-12-17

## 2019-06-10 RX ORDER — ACYCLOVIR 400 MG/1
TABLET ORAL
Qty: 60 TAB | Refills: 0 | Status: SHIPPED | OUTPATIENT
Start: 2019-06-10 | End: 2021-06-10 | Stop reason: SDUPTHER

## 2019-06-24 ENCOUNTER — HOSPITAL ENCOUNTER (OUTPATIENT)
Dept: LAB | Facility: MEDICAL CENTER | Age: 49
End: 2019-06-24
Attending: NURSE PRACTITIONER
Payer: COMMERCIAL

## 2019-06-24 DIAGNOSIS — Z13.220 SCREENING, LIPID: ICD-10-CM

## 2019-06-24 DIAGNOSIS — G35 MS (MULTIPLE SCLEROSIS) (HCC): ICD-10-CM

## 2019-06-24 DIAGNOSIS — E03.4 HYPOTHYROIDISM DUE TO ACQUIRED ATROPHY OF THYROID: ICD-10-CM

## 2019-06-24 LAB
ALBUMIN SERPL BCP-MCNC: 4.4 G/DL (ref 3.2–4.9)
ALBUMIN/GLOB SERPL: 1.9 G/DL
ALP SERPL-CCNC: 59 U/L (ref 30–99)
ALT SERPL-CCNC: 41 U/L (ref 2–50)
ANION GAP SERPL CALC-SCNC: 6 MMOL/L (ref 0–11.9)
AST SERPL-CCNC: 29 U/L (ref 12–45)
BILIRUB SERPL-MCNC: 0.5 MG/DL (ref 0.1–1.5)
BUN SERPL-MCNC: 11 MG/DL (ref 8–22)
CALCIUM SERPL-MCNC: 9.7 MG/DL (ref 8.5–10.5)
CHLORIDE SERPL-SCNC: 107 MMOL/L (ref 96–112)
CHOLEST SERPL-MCNC: 234 MG/DL (ref 100–199)
CO2 SERPL-SCNC: 28 MMOL/L (ref 20–33)
CREAT SERPL-MCNC: 0.71 MG/DL (ref 0.5–1.4)
FASTING STATUS PATIENT QL REPORTED: NORMAL
GLOBULIN SER CALC-MCNC: 2.3 G/DL (ref 1.9–3.5)
GLUCOSE SERPL-MCNC: 90 MG/DL (ref 65–99)
HDLC SERPL-MCNC: 74 MG/DL
LDLC SERPL CALC-MCNC: 150 MG/DL
POTASSIUM SERPL-SCNC: 4.2 MMOL/L (ref 3.6–5.5)
PROT SERPL-MCNC: 6.7 G/DL (ref 6–8.2)
SODIUM SERPL-SCNC: 141 MMOL/L (ref 135–145)
TRIGL SERPL-MCNC: 52 MG/DL (ref 0–149)
TSH SERPL DL<=0.005 MIU/L-ACNC: 3.95 UIU/ML (ref 0.38–5.33)

## 2019-06-24 PROCEDURE — 80061 LIPID PANEL: CPT

## 2019-06-24 PROCEDURE — 36415 COLL VENOUS BLD VENIPUNCTURE: CPT

## 2019-06-24 PROCEDURE — 84443 ASSAY THYROID STIM HORMONE: CPT

## 2019-06-24 PROCEDURE — 80053 COMPREHEN METABOLIC PANEL: CPT

## 2019-07-16 PROBLEM — K59.1 FUNCTIONAL DIARRHEA: Status: ACTIVE | Noted: 2019-07-16

## 2019-07-18 DIAGNOSIS — G43.009 MIGRAINE WITHOUT AURA AND WITHOUT STATUS MIGRAINOSUS, NOT INTRACTABLE: ICD-10-CM

## 2019-07-19 RX ORDER — RIZATRIPTAN BENZOATE 10 MG/1
TABLET ORAL
Qty: 10 TAB | Refills: 11 | Status: SHIPPED | OUTPATIENT
Start: 2019-07-19 | End: 2020-07-08

## 2019-07-22 ENCOUNTER — OFFICE VISIT (OUTPATIENT)
Dept: MEDICAL GROUP | Facility: MEDICAL CENTER | Age: 49
End: 2019-07-22
Payer: COMMERCIAL

## 2019-07-22 ENCOUNTER — OFFICE VISIT (OUTPATIENT)
Dept: NEUROLOGY | Facility: MEDICAL CENTER | Age: 49
End: 2019-07-22
Payer: COMMERCIAL

## 2019-07-22 VITALS
SYSTOLIC BLOOD PRESSURE: 104 MMHG | TEMPERATURE: 99.3 F | DIASTOLIC BLOOD PRESSURE: 64 MMHG | HEART RATE: 90 BPM | BODY MASS INDEX: 22.88 KG/M2 | OXYGEN SATURATION: 98 % | HEIGHT: 64 IN | WEIGHT: 134 LBS | RESPIRATION RATE: 14 BRPM

## 2019-07-22 VITALS
WEIGHT: 134.2 LBS | DIASTOLIC BLOOD PRESSURE: 66 MMHG | RESPIRATION RATE: 16 BRPM | TEMPERATURE: 97.6 F | HEART RATE: 98 BPM | OXYGEN SATURATION: 97 % | HEIGHT: 64 IN | BODY MASS INDEX: 22.91 KG/M2 | SYSTOLIC BLOOD PRESSURE: 104 MMHG

## 2019-07-22 DIAGNOSIS — G35 MS (MULTIPLE SCLEROSIS) (HCC): ICD-10-CM

## 2019-07-22 DIAGNOSIS — E78.5 HYPERLIPIDEMIA, UNSPECIFIED HYPERLIPIDEMIA TYPE: ICD-10-CM

## 2019-07-22 DIAGNOSIS — G43.009 MIGRAINE WITHOUT AURA AND WITHOUT STATUS MIGRAINOSUS, NOT INTRACTABLE: ICD-10-CM

## 2019-07-22 DIAGNOSIS — R19.7 DIARRHEA, UNSPECIFIED TYPE: ICD-10-CM

## 2019-07-22 DIAGNOSIS — N30.10 IC (INTERSTITIAL CYSTITIS): ICD-10-CM

## 2019-07-22 DIAGNOSIS — E03.4 HYPOTHYROIDISM DUE TO ACQUIRED ATROPHY OF THYROID: ICD-10-CM

## 2019-07-22 PROCEDURE — 99214 OFFICE O/P EST MOD 30 MIN: CPT | Performed by: PSYCHIATRY & NEUROLOGY

## 2019-07-22 PROCEDURE — 99396 PREV VISIT EST AGE 40-64: CPT | Performed by: NURSE PRACTITIONER

## 2019-07-22 ASSESSMENT — ENCOUNTER SYMPTOMS
SENSORY CHANGE: 1
DOUBLE VISION: 0
DIARRHEA: 1
INSOMNIA: 0
PHOTOPHOBIA: 0
TINGLING: 1
CONSTIPATION: 0
MEMORY LOSS: 1
HEADACHES: 1

## 2019-07-22 NOTE — PROGRESS NOTES
CC: Annual Exam        HPI:     Ct presents today for the followin. Diarrhea, unspecified type  Last year in July she had a bout of diarrhea.  Again equally this year around the last several weeks she had some diarrhea which is since improved.  It was not black or bloody.  Not associated with pain or fever.  She said no recent travel, no history recent antibiotics or recent hospitalization.  She does get symptom improvement with Pepto-Bismol.  Did not get any improvement with the brat diet.  She was wondering if this potentially could be an MS flare    2. MS (multiple sclerosis) (HCC)  Fairly stable and on medication regimen.  Followed by Dr. Belcher.  She has an upcoming appointment with him.  Recently retired due to the excessive fatigue    3. Migraine without aura and without status migrainosus, not intractable  Does well with her medications.  Followed by Dr. Belcher    4. IC (interstitial cystitis)  Symptoms controlled with her medications    5. Hypothyroidism due to acquired atrophy of thyroid  Currently she is has remained euthyroid off medication.  We will continue to check it yearly    6. Hyperlipidemia, unspecified hyperlipidemia type  Did have an interestingly spike in her LDL this year.  She states she had been using coconut oil and she since taking this out of her diet.  She is currently using a small amount of walnut oil    Current Outpatient Prescriptions   Medication Sig Dispense Refill   • rizatriptan (MAXALT) 10 MG tablet TAKE 1 TAB BY MOUTH ONCE FOR MIGRAINE FOR UP TO 1 DOSE. 10 Tab 11   • acyclovir (ZOVIRAX) 400 MG tablet TAKE 2 TABLETS BY MOUTH TWICE A DAY FOR 5 DAYS 60 Tab 0   • modafinil (PROVIGIL) 200 MG Tab TAKE 1 TABLET BY MOUTH EVERY DAY 90 Tab 1   • Teriflunomide (AUBAGIO) 14 MG Tab Take 1 Tab by mouth every day. 30 Tab 11   • pentosan (ELMIRON) 100 MG Cap TAKE 1 CAPSULE 3 TIMES DAILY BEFORE MEALS 270 Cap 2   • amitriptyline (ELAVIL) 25 MG Tab TAKE 1/2 TO 1 TABLET BY MOUTH AT  "BEDTIME AS NEEDED 90 Tab 3   • ZOMIG 5 MG nasal solution SPRAY 1 SPRAY IN NOSE ONCE AS NEEDED FOR HEADACHE FOR UP TO 1 DOSE. 6 Each 4   • montelukast (SINGULAIR) 10 MG Tab TAKE 1 TABLET BY ORAL ROUTE EVERY DAY IN THE EVENING 90 Tab 3   • mometasone (NASONEX) 50 MCG/ACT nasal spray SPRAY 2 SPRAYS IN EACH NOSTRIL TWICE A DAY. 1 Inhaler 11   • phenazopyridine (PYRIDIUM) 200 MG Tab Take 1 Tab by mouth 3 times a day as needed. 6 Tab 11   • Misc. Devices Misc 1 Units by Does not apply route every day. 1 Units 0   • vitamin D (CHOLECALCIFEROL) 1000 UNIT Tab Take  by mouth every day.     • cetirizine (ZYRTEC) 10 MG Tab Take 10 mg by mouth every day.       No current facility-administered medications for this visit.      Social History   Substance Use Topics   • Smoking status: Never Smoker   • Smokeless tobacco: Never Used   • Alcohol use Yes      Comment: social     I reviewed patients allergies, problem list and medications today in [facility].    ROS: Any/all pertinent positives listed in the HPI, otherwise all others reviewed are negative today.      /64 (BP Location: Left arm, Patient Position: Sitting, BP Cuff Size: Adult)   Pulse 90   Temp 37.4 °C (99.3 °F) (Temporal)   Resp 14   Ht 1.626 m (5' 4\")   Wt 60.8 kg (134 lb)   SpO2 98%   BMI 23.00 kg/m²     Exam:    Gen: Alert and oriented, No apparent distress. WDWN  Psych: A+Ox3, normal affect and mood  Skin: Warm, dry and intact. Good turgor   No rashes in visible areas.  Eye: Conjunctiva clear, lids normal  ENMT: Lips without lesions, good dentition  Neck: No Lymphadenopathy, Thyromegaly, Bruits.   Trachea midline, no masses  Lungs: Clear to auscultation bilaterally, no rales or rhonchi   Unlabored respiratory effort.   CV: Regular rate and rhythm, S1, S2. No murmurs.   No Edema        Assessment and Plan.   48 y.o. female with the following issues.    1. Diarrhea, unspecified type  Currently resolved and improved.  This may be more related to some IBS from " certain foods that are seasonal.  I did give her the FODMAP diet.  If her symptoms continue to linger or change we will consider having her see gastroenterology.  Patient is in agreements    2. MS (multiple sclerosis) (HCC)  Stable, continue treatment and follow-up with neurology    3. Migraine without aura and without status migrainosus, not intractable  Stable, continue meds    4. IC (interstitial cystitis)  Stable continue meds    5. Hypothyroidism due to acquired atrophy of thyroid  Stable monitor TSH yearly    6. Hyperlipidemia, unspecified hyperlipidemia type  She will continue to eliminate coconut oil from her diet.  Recheck in around 3 months  - Lipid Profile; Future

## 2019-07-23 NOTE — PROGRESS NOTES
Subjective:      Ct Montalvo is a 48 y.o. female who presents with her  Hemanth, as always, for follow-up, with a history of MS and now increasing problem with migraine.    HPI    MS: Clinically, she seems to be doing well, she has not had any symptom exacerbation consistent with disease relapse.  Though she does have this problem with fluctuating diarrhea, with this, there has never been additional symptomatology such as worsening of her urinary urgency, increased fatigue, or focal motor, sensory or cerebellar abnormalities.  Even the paresthesias in the feet has improved.  The cognitive dysfunction has proved so problematic that she actually had to take early halfway.  The fatigue itself is responding to her Provigil.    Originally she had developed diarrhea, on Aubagio at the time, we stopped the drug and things seemed to improve, but the diarrhea recurred, she got back on the drug and even though we discussed changing her to a different disease modifying treatment, given the potential side effects, etc., she opted in for staying on Aubagio.  Imaging over the last 2 years has been stable, we repeated MRI of the brain and thoracic cord in May, 2019, I reviewed the images, showing a stable burden of disease in all locations, no evidence of enhancement or other signs of active disease.    Migraine without aura: For the last 3 months there has been a sudden increase in the headaches, for unclear reasons.  There is still unilateral, incapacitating with pulsatile quality, impaired concentration, heightened sensitivities, etc.  The problem is that the headaches are lasting for consecutive days.  Though the Zomig nasal spray works, even the Maxalt-MLT wafers are providing more limited benefit, the headache recurs the following day.  She figures an average she is having to treat more than 10 headaches in a month.    Medical, surgical and family histories are reviewed in the electronic health record, there  "are no new drug allergies.  She is on Aubagio 14 mg daily, Provigil 200 mg daily, Maxalt 10 mg and Zomig 5 mg nasal spray as needed, Pyridium 200 mg 3 times daily as needed, Elavil 25 mg nightly as needed, and the rest as per the electronic health record, noncontributory from my standpoint.    Review of Systems   Constitutional: Positive for malaise/fatigue.   Eyes: Negative for double vision and photophobia.   Gastrointestinal: Positive for diarrhea. Negative for constipation.   Genitourinary: Positive for frequency and urgency.   Neurological: Positive for tingling, sensory change and headaches.   Psychiatric/Behavioral: Positive for memory loss. The patient does not have insomnia.    All other systems reviewed and are negative.       Objective:     /66 (BP Location: Left arm, Patient Position: Sitting)   Pulse 98   Temp 36.4 °C (97.6 °F)   Resp 16   Ht 1.626 m (5' 4\")   Wt 60.9 kg (134 lb 3.2 oz)   SpO2 97%   BMI 23.04 kg/m²      Physical Exam    She appears in no acute distress.  Her vital signs are stable.  There is no malar rash.  There is no temporal or jaw tenderness or jaw claudication, the occipital ridge and notch are nontender bilaterally.  Cervical spine shows a full range of motion, compression maneuvers are negative, Lhermitte's phenomena is absent.  Cardiac evaluation reveals a regular rhythm.    Cognition is grossly intact, there is no evidence of focal cognitive or language deficit.  She is fully oriented.    PERRLA/EOMI, visual fields are full to finger counting on confrontation bilaterally, facial movements are symmetric, the tongue and uvula are midline without bulbar dysfunction, sensory exam is intact to temperature light touch bilaterally, shoulder shrug and head rotation are intact and symmetric.    Musculoskeletal exam reveals normal tone throughout, there is no tremor, asterixis, or drift.  Strength is 5/5 in all 4 extremities, this is some improvement in the lower " extremities.  Reflexes are slightly brisk with the left leg than the on the right, the left toe is upgoing, the right downgoing, reflexes are otherwise brisk and symmetric in the upper extremities.    She stands easily, heel, toe, and tandem walking can all be done without issue.  There is no circumduction and arm swing is symmetric.  There is no appendicular dystaxia, repetitive movements are symmetric throughout.    Sensory exam is intact to temperature and vibration.  Romberg is absent.     Assessment/Plan:     1. MS (multiple sclerosis) (HCC)  Clinically and radiographically stable, we can continue the Aubagio.  When we follow-up next year in 12 months, repeat studies including LFT and CBC can also be checked.  Clinically she knows the signs and symptoms suggestive of disease activity, she was reassured that the fluctuating diarrhea is not a sign of disease relapse, let alone disease progression.  We will continue the Provigil.    2. Migraine without aura and without status migrainosus, not intractable  A bigger issue, simply because of quality of life, she was reassured that this is a separate issue than the MS, there is no singular location for an MS lesion to be placed for migraine to develop or worsen.  For now I am a little concerned about the increasing frequency as well as duration of the headache she is suffering from, there may be a loss of efficacy of her rescue as well, and these are 3 criteria that would warrant considering maintenance therapy initiation.  She will think about this for another month to see if this pattern of escalation continues, she was told to call the office if this were the case.  Otherwise she will continue the Zomig and Maxalt use as needed, we will follow-up in 6 months.    Time: 25 minutes spent face-to-face for exam, review, discussion, and education, of this over 50% of the time spent face-to-face counseling and coordinating care.

## 2019-09-16 RX ORDER — ACYCLOVIR 400 MG/1
TABLET ORAL
Qty: 60 TAB | Refills: 11 | Status: SHIPPED | OUTPATIENT
Start: 2019-09-16 | End: 2020-04-22 | Stop reason: SDUPTHER

## 2019-09-27 DIAGNOSIS — G43.109 MIGRAINE WITH AURA AND WITHOUT STATUS MIGRAINOSUS, NOT INTRACTABLE: ICD-10-CM

## 2019-10-02 RX ORDER — ZOLMITRIPTAN 5 MG/1
SPRAY, METERED NASAL
Qty: 6 EACH | Refills: 4 | Status: SHIPPED | OUTPATIENT
Start: 2019-10-02 | End: 2020-10-27 | Stop reason: SDUPTHER

## 2019-10-07 RX ORDER — MOMETASONE FUROATE 50 UG/1
SPRAY, METERED NASAL
Qty: 17 G | Refills: 11 | Status: SHIPPED | OUTPATIENT
Start: 2019-10-07 | End: 2020-09-21

## 2019-10-09 ENCOUNTER — PATIENT MESSAGE (OUTPATIENT)
Dept: MEDICAL GROUP | Facility: MEDICAL CENTER | Age: 49
End: 2019-10-09

## 2019-10-13 RX ORDER — MONTELUKAST SODIUM 10 MG/1
TABLET ORAL
Qty: 90 TAB | Refills: 3 | Status: SHIPPED | OUTPATIENT
Start: 2019-10-13 | End: 2020-12-04 | Stop reason: SDUPTHER

## 2019-10-21 ENCOUNTER — HOSPITAL ENCOUNTER (OUTPATIENT)
Dept: LAB | Facility: MEDICAL CENTER | Age: 49
End: 2019-10-21
Attending: NURSE PRACTITIONER
Payer: COMMERCIAL

## 2019-10-21 DIAGNOSIS — E78.5 HYPERLIPIDEMIA, UNSPECIFIED HYPERLIPIDEMIA TYPE: ICD-10-CM

## 2019-10-21 LAB
CHOLEST SERPL-MCNC: 227 MG/DL (ref 100–199)
FASTING STATUS PATIENT QL REPORTED: NORMAL
HDLC SERPL-MCNC: 73 MG/DL
LDLC SERPL CALC-MCNC: 140 MG/DL
TRIGL SERPL-MCNC: 68 MG/DL (ref 0–149)

## 2019-10-21 PROCEDURE — 80061 LIPID PANEL: CPT

## 2019-10-21 PROCEDURE — 36415 COLL VENOUS BLD VENIPUNCTURE: CPT

## 2019-12-17 DIAGNOSIS — G35 MS (MULTIPLE SCLEROSIS) (HCC): ICD-10-CM

## 2019-12-17 RX ORDER — MODAFINIL 200 MG/1
TABLET ORAL
Qty: 90 TAB | Refills: 1 | Status: SHIPPED | OUTPATIENT
Start: 2019-12-17 | End: 2020-06-15

## 2020-01-14 ENCOUNTER — PATIENT MESSAGE (OUTPATIENT)
Dept: NEUROLOGY | Facility: MEDICAL CENTER | Age: 50
End: 2020-01-14

## 2020-01-14 NOTE — TELEPHONE ENCOUNTER
Regarding: RE: Prescription Question  ----- Message from Jean Marie Shelley M.D. sent at 1/10/2020  6:06 PM PST -----       ----- Message sent from Florencio Mujica't to Ct Montalvo at 1/10/2020  9:38 AM -----   I have forwarded him this message.       ----- Message -----     From: Ct Montalvo     Sent: 1/10/2020  9:21 AM PST       To: Jean Marie Shelley M.D.  Subject: RE: Prescription Question    Hi,  1. My health insurance has switched specialty pharmacies to [facility]  who does not have aubagio on their formulary. I am hoping you will be able to ask my health provider to allow me to continue to receive insurance coverage so I can take aubagio  as prescribed. I am working with a nurse from aubagio who most likely will be sending you prior authorizations to sign. If there is anything else you feel could be useful in getting the insurance to cover me I would be very appreciative. I have 8 left.  2. My hands/arms tingle when in certain positions like driving or texting and most especially at night when trying to sleep. It wakes me up and often I am unable to find a position that takes the tingling away. Please advise.  Thank you in advance.

## 2020-01-15 NOTE — TELEPHONE ENCOUNTER
Talked to patients specialty pharmacy spoke with CJ about the medication Aubagio stated that they only have the 30 pill count not the 28 pill count for this,  I stated to them well the prescription was for 30 days with not the 28 pill count they stated they dont have anymore of the 28 pill count I stated that is fine just send her the the 30 with 11 refills that way it lasts her for a yr.

## 2020-01-17 ENCOUNTER — APPOINTMENT (OUTPATIENT)
Dept: RADIOLOGY | Facility: MEDICAL CENTER | Age: 50
End: 2020-01-17
Attending: OBSTETRICS & GYNECOLOGY

## 2020-01-27 ENCOUNTER — HOSPITAL ENCOUNTER (OUTPATIENT)
Dept: RADIOLOGY | Facility: MEDICAL CENTER | Age: 50
End: 2020-01-27
Attending: OBSTETRICS & GYNECOLOGY
Payer: COMMERCIAL

## 2020-01-27 DIAGNOSIS — Z12.31 VISIT FOR SCREENING MAMMOGRAM: ICD-10-CM

## 2020-01-27 PROCEDURE — 77067 SCR MAMMO BI INCL CAD: CPT

## 2020-03-25 ENCOUNTER — APPOINTMENT (OUTPATIENT)
Dept: NEUROLOGY | Facility: MEDICAL CENTER | Age: 50
End: 2020-03-25
Payer: COMMERCIAL

## 2020-05-12 ENCOUNTER — TELEPHONE (OUTPATIENT)
Dept: NEUROLOGY | Facility: MEDICAL CENTER | Age: 50
End: 2020-05-12

## 2020-05-12 DIAGNOSIS — G35 MULTIPLE SCLEROSIS (HCC): ICD-10-CM

## 2020-05-13 NOTE — TELEPHONE ENCOUNTER
Let the patient know I put the orders in for the MRI scans of her thoracic spine and brain, good through May, 2021.  She is can set these up prior to the office visit she has in August of this year.  Remind her she needs to get her blood work to make sure kidney function is stable.

## 2020-05-13 NOTE — TELEPHONE ENCOUNTER
----- Message from Florencio Arguello Ass't sent at 5/12/2020  2:22 PM PDT -----  Regarding: MRI for Patient  Good afternoon,    I re-scheduled patient from 6/1/20 to 8/24/20 added them to the wait list also, patient asked if we could order her yearly MRI's she would like to get them done prior to her August appt.    Thank you,    Melinda

## 2020-06-02 ENCOUNTER — HOSPITAL ENCOUNTER (OUTPATIENT)
Dept: RADIOLOGY | Facility: MEDICAL CENTER | Age: 50
End: 2020-06-02
Attending: PSYCHIATRY & NEUROLOGY
Payer: COMMERCIAL

## 2020-06-02 DIAGNOSIS — G35 MULTIPLE SCLEROSIS (HCC): ICD-10-CM

## 2020-06-02 PROCEDURE — A9576 INJ PROHANCE MULTIPACK: HCPCS | Performed by: PSYCHIATRY & NEUROLOGY

## 2020-06-02 PROCEDURE — 70553 MRI BRAIN STEM W/O & W/DYE: CPT

## 2020-06-02 PROCEDURE — 72157 MRI CHEST SPINE W/O & W/DYE: CPT

## 2020-06-02 PROCEDURE — 700117 HCHG RX CONTRAST REV CODE 255: Performed by: PSYCHIATRY & NEUROLOGY

## 2020-06-02 RX ADMIN — GADOTERIDOL 10 ML: 279.3 INJECTION, SOLUTION INTRAVENOUS at 15:57

## 2020-06-04 ENCOUNTER — PATIENT MESSAGE (OUTPATIENT)
Dept: MEDICAL GROUP | Facility: MEDICAL CENTER | Age: 50
End: 2020-06-04

## 2020-06-04 DIAGNOSIS — E03.4 HYPOTHYROIDISM DUE TO ACQUIRED ATROPHY OF THYROID: ICD-10-CM

## 2020-06-04 DIAGNOSIS — G35 MS (MULTIPLE SCLEROSIS) (HCC): ICD-10-CM

## 2020-06-04 DIAGNOSIS — Z13.21 ENCOUNTER FOR VITAMIN DEFICIENCY SCREENING: ICD-10-CM

## 2020-06-04 DIAGNOSIS — N30.10 IC (INTERSTITIAL CYSTITIS): ICD-10-CM

## 2020-06-04 DIAGNOSIS — E78.5 HYPERLIPIDEMIA, UNSPECIFIED HYPERLIPIDEMIA TYPE: ICD-10-CM

## 2020-06-14 DIAGNOSIS — G35 MS (MULTIPLE SCLEROSIS) (HCC): ICD-10-CM

## 2020-06-15 RX ORDER — MODAFINIL 200 MG/1
TABLET ORAL
Qty: 90 TAB | Refills: 1 | Status: SHIPPED | OUTPATIENT
Start: 2020-06-15 | End: 2020-12-15

## 2020-06-17 ENCOUNTER — HOSPITAL ENCOUNTER (OUTPATIENT)
Dept: LAB | Facility: MEDICAL CENTER | Age: 50
End: 2020-06-17
Attending: PSYCHIATRY & NEUROLOGY
Payer: COMMERCIAL

## 2020-06-17 ENCOUNTER — HOSPITAL ENCOUNTER (OUTPATIENT)
Dept: LAB | Facility: MEDICAL CENTER | Age: 50
End: 2020-06-17
Attending: NURSE PRACTITIONER
Payer: COMMERCIAL

## 2020-06-17 DIAGNOSIS — N30.10 IC (INTERSTITIAL CYSTITIS): ICD-10-CM

## 2020-06-17 DIAGNOSIS — G35 MS (MULTIPLE SCLEROSIS) (HCC): ICD-10-CM

## 2020-06-17 DIAGNOSIS — G35 MULTIPLE SCLEROSIS (HCC): ICD-10-CM

## 2020-06-17 DIAGNOSIS — Z13.21 ENCOUNTER FOR VITAMIN DEFICIENCY SCREENING: ICD-10-CM

## 2020-06-17 DIAGNOSIS — E03.4 HYPOTHYROIDISM DUE TO ACQUIRED ATROPHY OF THYROID: ICD-10-CM

## 2020-06-17 DIAGNOSIS — E78.5 HYPERLIPIDEMIA, UNSPECIFIED HYPERLIPIDEMIA TYPE: ICD-10-CM

## 2020-06-17 LAB
25(OH)D3 SERPL-MCNC: 60 NG/ML (ref 30–100)
ALBUMIN SERPL BCP-MCNC: 4.4 G/DL (ref 3.2–4.9)
ALBUMIN SERPL BCP-MCNC: 4.4 G/DL (ref 3.2–4.9)
ALBUMIN/GLOB SERPL: 1.8 G/DL
ALP SERPL-CCNC: 65 U/L (ref 30–99)
ALT SERPL-CCNC: 33 U/L (ref 2–50)
ANION GAP SERPL CALC-SCNC: 14 MMOL/L (ref 7–16)
AST SERPL-CCNC: 27 U/L (ref 12–45)
BASOPHILS # BLD AUTO: 1.5 % (ref 0–1.8)
BASOPHILS # BLD: 0.06 K/UL (ref 0–0.12)
BILIRUB SERPL-MCNC: 0.5 MG/DL (ref 0.1–1.5)
BUN SERPL-MCNC: 10 MG/DL (ref 8–22)
BUN SERPL-MCNC: 9 MG/DL (ref 8–22)
CALCIUM SERPL-MCNC: 9.6 MG/DL (ref 8.5–10.5)
CALCIUM SERPL-MCNC: 9.7 MG/DL (ref 8.5–10.5)
CHLORIDE SERPL-SCNC: 104 MMOL/L (ref 96–112)
CHLORIDE SERPL-SCNC: 105 MMOL/L (ref 96–112)
CHOLEST SERPL-MCNC: 245 MG/DL (ref 100–199)
CO2 SERPL-SCNC: 24 MMOL/L (ref 20–33)
CO2 SERPL-SCNC: 26 MMOL/L (ref 20–33)
CREAT SERPL-MCNC: 0.65 MG/DL (ref 0.5–1.4)
CREAT SERPL-MCNC: 0.67 MG/DL (ref 0.5–1.4)
EOSINOPHIL # BLD AUTO: 0.25 K/UL (ref 0–0.51)
EOSINOPHIL NFR BLD: 6.4 % (ref 0–6.9)
ERYTHROCYTE [DISTWIDTH] IN BLOOD BY AUTOMATED COUNT: 47.4 FL (ref 35.9–50)
FASTING STATUS PATIENT QL REPORTED: NORMAL
GLOBULIN SER CALC-MCNC: 2.5 G/DL (ref 1.9–3.5)
GLUCOSE SERPL-MCNC: 87 MG/DL (ref 65–99)
GLUCOSE SERPL-MCNC: 88 MG/DL (ref 65–99)
HCT VFR BLD AUTO: 40.4 % (ref 37–47)
HDLC SERPL-MCNC: 85 MG/DL
HGB BLD-MCNC: 13.1 G/DL (ref 12–16)
IMM GRANULOCYTES # BLD AUTO: 0 K/UL (ref 0–0.11)
IMM GRANULOCYTES NFR BLD AUTO: 0 % (ref 0–0.9)
LDLC SERPL CALC-MCNC: 145 MG/DL
LYMPHOCYTES # BLD AUTO: 1.58 K/UL (ref 1–4.8)
LYMPHOCYTES NFR BLD: 40.7 % (ref 22–41)
MCH RBC QN AUTO: 32.8 PG (ref 27–33)
MCHC RBC AUTO-ENTMCNC: 32.4 G/DL (ref 33.6–35)
MCV RBC AUTO: 101.3 FL (ref 81.4–97.8)
MONOCYTES # BLD AUTO: 0.43 K/UL (ref 0–0.85)
MONOCYTES NFR BLD AUTO: 11.1 % (ref 0–13.4)
NEUTROPHILS # BLD AUTO: 1.56 K/UL (ref 2–7.15)
NEUTROPHILS NFR BLD: 40.3 % (ref 44–72)
NRBC # BLD AUTO: 0 K/UL
NRBC BLD-RTO: 0 /100 WBC
PHOSPHATE SERPL-MCNC: 3 MG/DL (ref 2.5–4.5)
PLATELET # BLD AUTO: 313 K/UL (ref 164–446)
PMV BLD AUTO: 10.1 FL (ref 9–12.9)
POTASSIUM SERPL-SCNC: 4.2 MMOL/L (ref 3.6–5.5)
POTASSIUM SERPL-SCNC: 4.3 MMOL/L (ref 3.6–5.5)
PROT SERPL-MCNC: 6.9 G/DL (ref 6–8.2)
RBC # BLD AUTO: 3.99 M/UL (ref 4.2–5.4)
SODIUM SERPL-SCNC: 142 MMOL/L (ref 135–145)
SODIUM SERPL-SCNC: 142 MMOL/L (ref 135–145)
TRIGL SERPL-MCNC: 75 MG/DL (ref 0–149)
TSH SERPL DL<=0.005 MIU/L-ACNC: 3.14 UIU/ML (ref 0.38–5.33)
WBC # BLD AUTO: 3.9 K/UL (ref 4.8–10.8)

## 2020-06-17 PROCEDURE — 82306 VITAMIN D 25 HYDROXY: CPT

## 2020-06-17 PROCEDURE — 80061 LIPID PANEL: CPT

## 2020-06-17 PROCEDURE — 36415 COLL VENOUS BLD VENIPUNCTURE: CPT

## 2020-06-17 PROCEDURE — 80053 COMPREHEN METABOLIC PANEL: CPT

## 2020-06-17 PROCEDURE — 80069 RENAL FUNCTION PANEL: CPT

## 2020-06-17 PROCEDURE — 85025 COMPLETE CBC W/AUTO DIFF WBC: CPT

## 2020-06-17 PROCEDURE — 84443 ASSAY THYROID STIM HORMONE: CPT

## 2020-06-18 PROBLEM — D70.8 OTHER NEUTROPENIA (HCC): Status: ACTIVE | Noted: 2020-06-18

## 2020-06-18 PROBLEM — R19.7 DIARRHEA: Status: RESOLVED | Noted: 2019-07-22 | Resolved: 2020-06-18

## 2020-06-18 PROBLEM — R20.2 PARESTHESIA: Status: RESOLVED | Noted: 2017-06-19 | Resolved: 2020-06-18

## 2020-06-22 ENCOUNTER — TELEPHONE (OUTPATIENT)
Dept: NEUROLOGY | Facility: MEDICAL CENTER | Age: 50
End: 2020-06-22

## 2020-06-22 NOTE — TELEPHONE ENCOUNTER
I called the pharmacy gave verbal for modafinil (PROVIGIL) 200 MG Tab,1 TABLET BY MOUTH EVERY DAY after the pharmacy said they didn't receive the prescription from last week

## 2020-07-07 DIAGNOSIS — G43.009 MIGRAINE WITHOUT AURA AND WITHOUT STATUS MIGRAINOSUS, NOT INTRACTABLE: ICD-10-CM

## 2020-07-08 RX ORDER — RIZATRIPTAN BENZOATE 10 MG/1
TABLET ORAL
Qty: 10 TAB | Refills: 11 | Status: SHIPPED | OUTPATIENT
Start: 2020-07-08 | End: 2020-10-27 | Stop reason: SDUPTHER

## 2020-07-10 ENCOUNTER — PATIENT MESSAGE (OUTPATIENT)
Dept: MEDICAL GROUP | Facility: MEDICAL CENTER | Age: 50
End: 2020-07-10

## 2020-07-10 NOTE — TELEPHONE ENCOUNTER
From: Ct Montalvo  To: JUDD Perez  Sent: 7/10/2020 9:27 AM PDT  Subject: Non-Urgent Medical Question    Hi,  I have an appointment Tuesday. Do you do teleconferencing thru zoom or is it important that I come to the hospital?   If I need to come in, I was hoping for some direction on where to park, If I’ll need to plan time to be screened, etc so I’m not late to my appointment.   Thank you.  Georgiana

## 2020-07-14 ENCOUNTER — TELEMEDICINE (OUTPATIENT)
Dept: MEDICAL GROUP | Facility: MEDICAL CENTER | Age: 50
End: 2020-07-14
Payer: COMMERCIAL

## 2020-07-14 VITALS — HEIGHT: 64 IN | BODY MASS INDEX: 23.64 KG/M2 | WEIGHT: 138.5 LBS

## 2020-07-14 DIAGNOSIS — E03.4 HYPOTHYROIDISM DUE TO ACQUIRED ATROPHY OF THYROID: ICD-10-CM

## 2020-07-14 DIAGNOSIS — G35 MULTIPLE SCLEROSIS (HCC): ICD-10-CM

## 2020-07-14 DIAGNOSIS — D70.8 OTHER NEUTROPENIA (HCC): ICD-10-CM

## 2020-07-14 DIAGNOSIS — E78.5 HYPERLIPIDEMIA, UNSPECIFIED HYPERLIPIDEMIA TYPE: ICD-10-CM

## 2020-07-14 PROCEDURE — 99213 OFFICE O/P EST LOW 20 MIN: CPT | Mod: 95,CR | Performed by: NURSE PRACTITIONER

## 2020-07-14 RX ORDER — MULTIVIT WITH MINERALS/LUTEIN
TABLET ORAL
COMMUNITY
End: 2021-07-02

## 2020-07-14 RX ORDER — MULTIVIT WITH MINERALS/LUTEIN
1 TABLET ORAL DAILY
COMMUNITY
End: 2020-10-27

## 2020-07-14 RX ORDER — BACITRACIN ZINC 500 [USP'U]/G
OINTMENT TOPICAL 2 TIMES DAILY
COMMUNITY
End: 2020-10-27

## 2020-07-14 ASSESSMENT — FIBROSIS 4 INDEX: FIB4 SCORE: 0.74

## 2020-07-14 NOTE — PROGRESS NOTES
Telemedicine Visit: Established Patient     This encounter was conducted via Zoom .   Verbal consent was obtained. Patient's identity was verified.    Subjective:   CC:  Cristino Fofana Dasia Montalvo is a 49 y.o. female presenting for evaluation and management of:    1. Hyperlipidemia, unspecified hyperlipidemia type  Cholesterol spike that occurred about 2-3 years ago.  She has a very healthy diet she eats good healthy fat she is decreased coconut oil and coconut milk.  She exercises 5 to 6 days a week including hiking, aerobic dancing and yoga.  She did inquire and she does not have a family history of high cholesterol.  She is low risk and has no cardiovascular disease    2. Other neutropenia (HCC)  Intermittent over the last several years low white blood cells and red blood cells.  H&H stable.  No platelet dysfunction unsure if could be related to medications    3. Hypothyroidism due to acquired atrophy of thyroid  Stable off medication    4. MS (multiple sclerosis) (HCC)  Followed by [facility].  Stable and doing well on her medication      ROS: Any/all pertinent positives listed in the HPI, otherwise all others reviewed are negative today.        Allergies   Allergen Reactions   • Sulfa Drugs Unspecified     Internal burning sensation       Current medicines (including changes today)  Current Outpatient Medications   Medication Sig Dispense Refill   • vitamin E (VITAMIN E) 1000 UNIT Cap Take 1 Cap by mouth every day.     • bacitracin 500 UNIT/GM Ointment Apply  to affected area(s) 2 times a day.     • Nutritional Supplements (JUICE PLUS FIBRE PO) Take  by mouth.     • Ascorbic Acid (VITAMIN C) 1000 MG Tab Take  by mouth.     • rizatriptan (MAXALT) 10 MG tablet TAKE 1 TABLET BY MOUTH ONCE FOR 1 DOSE FOR MIGRAINE 10 Tab 11   • modafinil (PROVIGIL) 200 MG Tab TAKE 1 TABLET BY MOUTH EVERY DAY 90 Tab 1   • acyclovir (ZOVIRAX) 400 MG tablet 1 po  Tab 3   • pentosan (ELMIRON) 100 MG Cap TAKE 1 CAPSULE 3  TIMES DAILY BEFORE MEALS 270 Cap 3   • montelukast (SINGULAIR) 10 MG Tab TAKE 1 TABLET BY MOUTH EVERY DAY IN THE EVENING 90 Tab 3   • ZOMIG 5 MG nasal solution SPRAY 1 SPRAY IN NOSE ONCE AS NEEDED FOR HEADACHE FOR UP TO 1 DOSE. 6 Each 4   • acyclovir (ZOVIRAX) 400 MG tablet TAKE 2 TABLETS BY MOUTH TWICE A DAY FOR 5 DAYS 60 Tab 0   • Teriflunomide (AUBAGIO) 14 MG Tab Take 1 Tab by mouth every day. 30 Tab 11   • amitriptyline (ELAVIL) 25 MG Tab TAKE 1/2 TO 1 TABLET BY MOUTH AT BEDTIME AS NEEDED 90 Tab 3   • phenazopyridine (PYRIDIUM) 200 MG Tab Take 1 Tab by mouth 3 times a day as needed. 6 Tab 11   • vitamin D (CHOLECALCIFEROL) 1000 UNIT Tab Take  by mouth every day.     • cetirizine (ZYRTEC) 10 MG Tab Take 10 mg by mouth every day.     • Misc. Devices Misc Shingrix vaccine IM x 1 dose, repeat x 2 months after first vaccine. 1 Dose 1   • mometasone (NASONEX) 50 MCG/ACT nasal spray SPRAY 2 SPRAYS IN EACH NOSTRIL TWICE A DAY. 17 g 11   • Misc. Devices Misc 1 Units by Does not apply route every day. 1 Units 0     No current facility-administered medications for this visit.        Patient Active Problem List    Diagnosis Date Noted   • Hyperlipidemia 07/22/2019     Priority: Medium   • MS (multiple sclerosis) ([facility]) 06/21/2017     Priority: Medium   • IC (interstitial cystitis) 06/14/2016     Priority: Medium   • Functional diarrhea 07/16/2019     Priority: Low   • Herpes labialis 06/15/2016     Priority: Low   • Migraine without aura, not intractable 06/14/2016     Priority: Low   • Hypothyroidism due to acquired atrophy of thyroid 06/14/2016     Priority: Low   • Other neutropenia ([facility]) 06/18/2020       Family History   Problem Relation Age of Onset   • Heart Disease Mother         chf   • Stroke Mother    • Cancer Mother         minor skin cancer   • Hyperlipidemia Brother    • Cancer Maternal Grandmother         brca-surv; gyn ca-surviv   • Diabetes Paternal Grandmother        She  has a past medical history of GERD  "(gastroesophageal reflux disease), Hyperlipidemia (7/22/2019), Kidney disease, Muscle disorder, and Thyroid disease. She also has no past medical history of Anxiety, Arrhythmia, Asthma, Blood transfusion without reported diagnosis, Cancer (HCC), CHF (congestive heart failure) (HCC), Clotting disorder (HCC), COPD (chronic obstructive pulmonary disease) (HCC), Depression, Diabetes (HCC), Diabetic neuropathy (HCC), Goiter, Heart attack (HCC), Heart murmur, Hypertension, Osteoporosis, Pulmonary emphysema (HCC), Seizure (HCC), Stroke (HCC), Substance abuse (HCC), or Tuberculosis.  She  has no past surgical history on file.       Objective:   Ht 1.626 m (5' 4\")   Wt 62.8 kg (138 lb 8 oz)   BMI 23.77 kg/m²     Physical Exam:  Constitutional: Alert, no distress, well-groomed.  Skin: No rashes in visible areas.  Eye: Round. Conjunctiva clear, lids normal. No icterus.   ENMT: Lips pink without lesions, good dentition, moist mucous membranes. Phonation normal.  Neck: No masses, no thyromegaly. Moves freely without pain.  CV no data  Respiratory: Unlabored respiratory effort, no cough or audible wheeze  Psych: Alert and oriented x3, normal affect and mood.       Assessment and Plan:   The following treatment plan was discussed:       1. Hyperlipidemia, unspecified hyperlipidemia type  At this point she has very good diet and exercise.  She is low risk.  She does not have a family history of hypercholesterolemia.  We did discuss she could try something like red rice yeast or cholesterol but she needs to inquire with her pharmacist if there is any interactions with her medications.  Otherwise we will continue to monitor and she will continue her healthy lifestyle.  If continues to increase we may consider statin  - Lipid Profile; Future    2. Other neutropenia (HCC)  Check again she has a pending lab.  To get this done whenever she has her cholesterol checked.  Unsure if could be related to medication effect with her Aubagio  - " CBC WITH DIFFERENTIAL; Future    3. Hypothyroidism due to acquired atrophy of thyroid  Stable monitor yearly    4. MS (multiple sclerosis) (HCC)  Continue care with neurology.  Has an appointment next month.      Follow-up: Earlier after she has the pending lab work drawn.

## 2020-08-07 RX ORDER — AMITRIPTYLINE HYDROCHLORIDE 25 MG/1
TABLET, FILM COATED ORAL
Qty: 90 TAB | Refills: 0 | Status: SHIPPED | OUTPATIENT
Start: 2020-08-07 | End: 2020-09-28

## 2020-08-24 ENCOUNTER — APPOINTMENT (OUTPATIENT)
Dept: NEUROLOGY | Facility: MEDICAL CENTER | Age: 50
End: 2020-08-24
Payer: COMMERCIAL

## 2020-09-21 RX ORDER — MOMETASONE FUROATE 50 UG/1
SPRAY, METERED NASAL
Qty: 17 G | Refills: 3 | Status: SHIPPED | OUTPATIENT
Start: 2020-09-21 | End: 2020-09-28

## 2020-09-28 RX ORDER — AMITRIPTYLINE HYDROCHLORIDE 25 MG/1
TABLET, FILM COATED ORAL
Qty: 90 TAB | Refills: 1 | Status: SHIPPED | OUTPATIENT
Start: 2020-09-28 | End: 2021-01-27 | Stop reason: SDUPTHER

## 2020-09-28 RX ORDER — MOMETASONE FUROATE 50 UG/1
SPRAY, METERED NASAL
Qty: 1 EACH | Refills: 4 | Status: SHIPPED | OUTPATIENT
Start: 2020-09-28 | End: 2021-03-01 | Stop reason: SDUPTHER

## 2020-10-07 ENCOUNTER — NURSE TRIAGE (OUTPATIENT)
Dept: HEALTH INFORMATION MANAGEMENT | Facility: OTHER | Age: 50
End: 2020-10-07

## 2020-10-07 NOTE — TELEPHONE ENCOUNTER
Regarding: PT DAUGHTER EXPOSED THROUGH WORK TO COVID  ----- Message from Antonia Chairez sent at 10/7/2020  7:50 AM PDT -----  PT DAUGHTER EXPOSED THROUGH WORK TO COVID

## 2020-10-07 NOTE — TELEPHONE ENCOUNTER
1. Caller Name: Ct Montalvo                 Call Back Number: [phone]  [facility] PCP or Specialty Provider: Yes         2.  In the last two weeks, has the patient had any new or worsening symptoms (not explained by alternative diagnosis)? No.    3.  Does patient have any comoribidities? MS    4.  Has the patient traveled in the last 14 days OR had any known contact with someone who is suspected or confirmed to have COVID-19?  No travel, daughter who lives w/pt (daughter moved out on 10/3/20, but was  in physical contact w/pt on 10/5/20) was informed that a co-worker tested + for covid & she needed to get tested & isolate.  Pt has appt w/Dr. Shelley on 10/7 @ 6821.  Pt has no symptoms.  Daughter has no symptoms.      Called transferred to neurology office for rescheduling.

## 2020-10-27 ENCOUNTER — OFFICE VISIT (OUTPATIENT)
Dept: NEUROLOGY | Facility: MEDICAL CENTER | Age: 50
End: 2020-10-27
Payer: COMMERCIAL

## 2020-10-27 VITALS
TEMPERATURE: 98.8 F | HEART RATE: 78 BPM | DIASTOLIC BLOOD PRESSURE: 60 MMHG | SYSTOLIC BLOOD PRESSURE: 102 MMHG | WEIGHT: 141.76 LBS | HEIGHT: 64 IN | BODY MASS INDEX: 24.2 KG/M2 | OXYGEN SATURATION: 97 %

## 2020-10-27 DIAGNOSIS — G35 MULTIPLE SCLEROSIS (HCC): Primary | ICD-10-CM

## 2020-10-27 DIAGNOSIS — G43.009 MIGRAINE WITHOUT AURA AND WITHOUT STATUS MIGRAINOSUS, NOT INTRACTABLE: ICD-10-CM

## 2020-10-27 PROCEDURE — 99214 OFFICE O/P EST MOD 30 MIN: CPT | Performed by: PSYCHIATRY & NEUROLOGY

## 2020-10-27 RX ORDER — LANOLIN ALCOHOL/MO/W.PET/CERES
1000 CREAM (GRAM) TOPICAL DAILY
COMMUNITY
End: 2021-07-02

## 2020-10-27 RX ORDER — ZOLMITRIPTAN 5 MG/1
SPRAY, METERED NASAL
Qty: 6 EACH | Refills: 4 | Status: SHIPPED | OUTPATIENT
Start: 2020-10-27 | End: 2021-03-02

## 2020-10-27 RX ORDER — RIZATRIPTAN BENZOATE 10 MG/1
TABLET ORAL
Qty: 10 TAB | Refills: 11 | Status: SHIPPED | OUTPATIENT
Start: 2020-10-27 | End: 2021-09-07

## 2020-10-27 ASSESSMENT — ENCOUNTER SYMPTOMS
CONSTIPATION: 0
MEMORY LOSS: 0
FOCAL WEAKNESS: 0
DOUBLE VISION: 0
FALLS: 0
HEADACHES: 1
TINGLING: 0
BLURRED VISION: 0

## 2020-10-27 ASSESSMENT — PATIENT HEALTH QUESTIONNAIRE - PHQ9: CLINICAL INTERPRETATION OF PHQ2 SCORE: 0

## 2020-10-27 ASSESSMENT — FIBROSIS 4 INDEX: FIB4 SCORE: 0.75

## 2020-10-27 NOTE — PROGRESS NOTES
"Subjective:      Ct Montalvo is a 50 y.o. female who presents with her  Hemanth, for follow-up, with a history of MS and migraine without aura.  She is now complaining of bilateral knee \"weakness\" that started a couple of weeks ago but which seems to have improved.     HPI    MS: Ct states that she has been doing well.  She has had no subsequent episodes with bilateral lower extremity weakness, paresthesias, etc. with which she presented years ago.  Fatigue has never been an issue, she still uses Elmiron for her bladder urgency.  Provigil is used for the fatigue, her knee has not increased.  She denies any other episodes with increased visual loss or double vision, hemiataxia or hemiparesis, etc.    Couple weeks ago she began to notice some \"weakness\" in her knees.  At the time she was very active with dance aerobics, there was some discomfort medially under the right patella, she began to back down on the activities, and things seem to have gotten better.  There were no other symptoms in association.  She was able to stand and walk without constraint.  She was not unsteady.  She has been on Aubagio 14 mg daily with good tolerability.    From June of this year, MRI scan of the brain and thoracic spines were repeated, the latter revealing only a stable mid thoracic cord lesion, actually less conspicuous on the recent exam when compared to that from the year before.  There is limited burden of disease in the brain, again no increase in number of lesions or evidence of active inflammation.  CBC and liver profiles were both unremarkable.  There has been an incidental increase in her total cholesterol, but her PCP is monitoring this.    Migraine: Headaches are still well controlled.  She has not required the use of maintenance therapy, she does use Elavil 25 mg tablets, 0.5 tablet nightly if needed, higher doses of the drug were never well-tolerated and did nothing for the headaches.  She can be several " "weeks without a headache and then the headache will last for about 4 days.  Zomig nasal spray works effectively, she will use Maxalt on subsequent days.    Medical, surgical and family history is reviewed, there are no new drug allergies.  She is on Aubagio 14 mg daily, Elmiron 100mg daily, Zomig 5 mg nasal spray and Maxalt 10 mg tablet as needed, Elavil as above, Zovirax, vitamin D with calcium, and the rest as per the electronic health record, noncontributory from my standpoint.    Review of Systems   Eyes: Negative for blurred vision and double vision.   Gastrointestinal: Negative for constipation.   Genitourinary: Positive for urgency.   Musculoskeletal: Negative for falls.   Neurological: Positive for headaches. Negative for tingling and focal weakness.   Psychiatric/Behavioral: Negative for memory loss.   All other systems reviewed and are negative.       Objective:     /60 (BP Location: Right arm, Patient Position: Sitting, BP Cuff Size: Adult)   Pulse 78   Temp 37.1 °C (98.8 °F) (Temporal)   Ht 1.626 m (5' 4\")   Wt 64.3 kg (141 lb 12.1 oz)   SpO2 97%   BMI 24.33 kg/m²      Physical Exam    She appears in no acute distress.  Vital signs are stable.  There is no malar rash or jaw claudication.  The neck is supple, range of motion is full, Lhermitte's phenomena is absent.  Cardiac evaluation reveals a regular rhythm.  Straight leg raising is negative bilaterally.  There is no tenderness of the knee joints themselves, there is no laxity.  There is no apparent swelling.    Cognition is intact, there is no aphasia or inattention.    PERRLA/EOMI, visual fields are full to finger counting on confrontation bilaterally, blind spot is not expanded as well.  Facial movements are symmetric, there is no bulbar dysfunction with the tongue and uvula midline.  Sensory exam is intact to temperature and light touch bilaterally.  Shoulder shrug and head rotation are intact.    Musculoskeletal exam reveals normal " "tone without tremor, asterixis, or drift.  Strength is 5/5 in all muscle groups in all 4 extremities.  Reflexes are brisk throughout, there are no obvious asymmetries, just a little brisker in the lower extremities when compared to the upper extremities, both toes are equivocal on plantar stimulation.    Repetitive movements with the hands and feet are normal and symmetric from right to left with maintained amplitude and frequencies throughout.  There is no appendicular dystaxia with any of the extremities.  She walks easily, arm swing is symmetric, gait is normal in station and stride length.    Sensory exam is intact to vibration and temperature.     Assessment/Plan:     1. MS (multiple sclerosis) (HCC)  Clinically and radiographically stable, serial imaging was first started in 2017 with her first relapse.  We will continue annual imaging, even though she has been stable, for another 2 years, and at that point if things remain radiographically and clinically stable, we can then probably push out the frequency a little bit, or simply image when symptoms occur.  She will continue the Aubagio, she is doing well with tolerability, liver and CBC profiles are unremarkable, and no evidence of disease activity.  Symptomatically she is doing well as well.  MRI of the brain and thoracic spines will be repeated next June, 2021, with follow-up afterwards.    The knee \"weakness\" I doubt is related to her disease, more likely a musculoskeletal issue.  She will talk with her PCP about getting a referral.  I recommended Gabriele Montes MD.    - MR-BRAIN-WITH & W/O; Future  - MR-THORACIC SPINE-WITH & W/O; Future  - Renal Function Panel; Future    2. Migraine without aura and without status migrainosus, not intractable  Stable, though the headaches are infrequent, they do last about 4 days each, but she is certainly not overusing her rescue.  We can maintain her present treatment regimen without issue.  We talked about other " options that are available were the headaches to begin to increase in frequency, duration, etc.  At present, we can simply observe.    - zolmitriptan (ZOMIG) 5 MG nasal solution; SPRAY 1 SPRAY IN NOSE ONCE AS NEEDED FOR HEADACHE FOR UP TO 1 DOSE.  Dispense: 6 Each; Refill: 4  - rizatriptan (MAXALT) 10 MG tablet; 1 tab at headache onset; repeat every hour as needed up to #3 tab in 24 hours  Dispense: 10 Tab; Refill: 11    Time: 25-minute spent face-to-face for exam, review, discussion, and education, of this over 50% of the time spent counseling and coordinating care.

## 2020-12-04 ENCOUNTER — TELEMEDICINE (OUTPATIENT)
Dept: MEDICAL GROUP | Facility: PHYSICIAN GROUP | Age: 50
End: 2020-12-04
Payer: COMMERCIAL

## 2020-12-04 VITALS — HEIGHT: 64 IN | WEIGHT: 140 LBS | BODY MASS INDEX: 23.9 KG/M2

## 2020-12-04 DIAGNOSIS — G43.009 MIGRAINE WITHOUT AURA AND WITHOUT STATUS MIGRAINOSUS, NOT INTRACTABLE: ICD-10-CM

## 2020-12-04 DIAGNOSIS — J30.2 SEASONAL ALLERGIES: ICD-10-CM

## 2020-12-04 DIAGNOSIS — E78.5 HYPERLIPIDEMIA, UNSPECIFIED HYPERLIPIDEMIA TYPE: ICD-10-CM

## 2020-12-04 DIAGNOSIS — G35 MULTIPLE SCLEROSIS (HCC): Chronic | ICD-10-CM

## 2020-12-04 DIAGNOSIS — N30.10 CHRONIC INTERSTITIAL CYSTITIS: ICD-10-CM

## 2020-12-04 PROBLEM — K59.1 FUNCTIONAL DIARRHEA: Status: RESOLVED | Noted: 2019-07-16 | Resolved: 2020-12-04

## 2020-12-04 PROCEDURE — 99215 OFFICE O/P EST HI 40 MIN: CPT | Mod: 95,CR | Performed by: INTERNAL MEDICINE

## 2020-12-04 RX ORDER — MONTELUKAST SODIUM 10 MG/1
10 TABLET ORAL
Qty: 90 TAB | Refills: 0 | Status: SHIPPED | OUTPATIENT
Start: 2020-12-04 | End: 2021-02-25

## 2020-12-04 ASSESSMENT — FIBROSIS 4 INDEX: FIB4 SCORE: 0.75

## 2020-12-04 NOTE — ASSESSMENT & PLAN NOTE
This is a chronic condition.  The patient is requesting refill for Singulair.  She denies shortness of breath wheezing fever or chills.  She also take antihistamine over-the-counter Zyrtec

## 2020-12-04 NOTE — ASSESSMENT & PLAN NOTE
This is a chronic condition but the patient is currently followed by neurology service.  She is currently stable on Aubagio

## 2020-12-04 NOTE — PROGRESS NOTES
This visit was conducted via  [employer] Virtual Visit using secure and encrypted videoconferencing technology. The patient was in a private location in the state of [place].   The patient's identity was confirmed and verbal consent was obtained for this virtual visit.  -------------------------------------------------------------------------------    CC: Request refill for montelukast.  Unable to see PCP until January 2021         HPI: This is a 50 y.o. pt.  Pt's medical history is notable for:      MS (multiple sclerosis) (HCC)  This is a chronic condition but the patient is currently followed by neurology service.  She is currently stable on Aubagio    Migraine without aura, not intractable  Chronic stable condition.  Also followed by neurology service.  Patient takes Zomig as needed.  No significant flareup noted recently.    IC (interstitial cystitis)  Chronic stable condition on Elmiron    Hyperlipidemia  Chronic condition patient currently on diet therapy.    Seasonal allergies  This is a chronic condition.  The patient is requesting refill for Singulair.  She denies shortness of breath wheezing fever or chills.  She also take antihistamine over-the-counter Zyrtec              REVIEW OF SYSTEMS:     Constitutional:  no fever / chills   Eyes: no changes in vision  ENT: no sore throat, no hearing loss  CV:  no chest pain, no palpitations  Pulmonary: no SOB, no cough    GI: no nausea / vomiting, no diarrhea, no constipation  :  no dysuria, no hematuria       Allergies: Sulfa drugs    Current Outpatient Medications Ordered in Epic   Medication Sig Dispense Refill   • montelukast (SINGULAIR) 10 MG Tab Take 1 Tab by mouth every day. N THE EVENING 90 Tab 0   • zolmitriptan (ZOMIG) 5 MG nasal solution SPRAY 1 SPRAY IN NOSE ONCE AS NEEDED FOR HEADACHE FOR UP TO 1 DOSE. 6 Each 4   • rizatriptan (MAXALT) 10 MG tablet 1 tab at headache onset; repeat every hour as needed up to #3 tab in 24 hours 10 Tab 11   •  amitriptyline (ELAVIL) 25 MG Tab TAKE 1/2 TO 1 TABLET BY MOUTH AT BEDTIME AS NEEDED 90 Tab 1   • mometasone (NASONEX) 50 MCG/ACT nasal spray SPRAY 2 SPRAYS IN EACH NOSTRIL TWICE A DAY. 1 Each 4   • pentosan (ELMIRON) 100 MG Cap TAKE 1 CAPSULE 3 TIMES DAILY BEFORE MEALS 270 Cap 3   • acyclovir (ZOVIRAX) 400 MG tablet TAKE 2 TABLETS BY MOUTH TWICE A DAY FOR 5 DAYS 60 Tab 0   • Teriflunomide (AUBAGIO) 14 MG Tab Take 1 Tab by mouth every day. 30 Tab 11   • Zinc 50 MG Cap Take 50 mg by mouth every day.     • Cyanocobalamin (VITAMIN B-12) 1000 MCG Tab Take 1,000 mcg by mouth every day.     • Nutritional Supplements (JUICE PLUS FIBRE PO) Take  by mouth.     • Ascorbic Acid (VITAMIN C) 1000 MG Tab Take  by mouth.     • modafinil (PROVIGIL) 200 MG Tab TAKE 1 TABLET BY MOUTH EVERY DAY 90 Tab 1   • acyclovir (ZOVIRAX) 400 MG tablet 1 po  Tab 3   • phenazopyridine (PYRIDIUM) 200 MG Tab Take 1 Tab by mouth 3 times a day as needed. 6 Tab 11   • vitamin D (CHOLECALCIFEROL) 1000 UNIT Tab Take  by mouth every day.     • cetirizine (ZYRTEC) 10 MG Tab Take 10 mg by mouth every day. Half daily       No current [facility]-ordered facility-administered medications on file.        Past Medical History:   Diagnosis Date   • GERD (gastroesophageal reflux disease)     no improvement with omeprazole.  on prevacid   • Hyperlipidemia 7/22/2019   • Kidney disease     kidney infection as a child, once only, inpatient   • Muscle disorder    • Thyroid disease     US NL. no treatments or procedures        History reviewed. No pertinent surgical history.     Family History   Problem Relation Age of Onset   • Heart Disease Mother         chf   • Stroke Mother    • Cancer Mother         minor skin cancer   • Hyperlipidemia Brother    • Cancer Maternal Grandmother         brca-surv; gyn ca-surviv   • Diabetes Paternal Grandmother         Social History     Tobacco Use   Smoking Status Never Smoker   Smokeless Tobacco Never Used          Social History      Substance and Sexual Activity   Alcohol Use Yes    Comment: social        ---------------------------------------------------------------------  There were no vitals filed for this visit.    PHYSICAL EXAM:   Psych:  A&O x 3, mood and affect appropriate  Constitutional: no distress  Skin: No apparent rashes  Eye: Conjunctiva clear, no icterus  ENMT: Lips without lesions. Phonation normal.  Neck: No obvious masses visible, no thyromegaly.   Respiratory: Unlabored respiratory effort    ---------------------------------------------------------------------    ASSESSMENT:   1. MS (multiple sclerosis) (HCC)     2. Migraine without aura and without status migrainosus, not intractable     3. Seasonal allergies     4. IC (interstitial cystitis)     5. Hyperlipidemia, unspecified hyperlipidemia type            MEDICAL DECISION MAKING: TODAY'S DISCUSSION / STATUS / PLAN:      Refill for montelukast given.   Advised the patient to continue with current medications  Continue follow-up with neurologist and to keep the appointment to see her PCP in January 2021.  Recommend lab work to be done however the patient request to see her PCP to order  Recommend low-fat low-cholesterol low-carb diet continue exercise a regular basis.           PATIENT EDUCATION:  -If any problems should arise, patient was advised to contact our office or go to ER to be evaluated.      Please note that this dictation was created using voice recognition software. I have made every reasonable attempt to correct obvious errors, but it is possible there are errors of grammar and possibly content that I did not discover before finalizing the note.

## 2020-12-04 NOTE — ASSESSMENT & PLAN NOTE
Chronic stable condition.  Also followed by neurology service.  Patient takes Zomig as needed.  No significant flareup noted recently.

## 2020-12-16 DIAGNOSIS — G35 MULTIPLE SCLEROSIS (HCC): Chronic | ICD-10-CM

## 2020-12-16 NOTE — TELEPHONE ENCOUNTER
Received request via: Pharmacy    Was the patient seen in the last year in this department? Yes    Does the patient have an active prescription (recently filled or refills available) for medication(s) requested? Yes.     Medication was discontinued on 6/15/20 by you . IS she starting this medication again ?

## 2020-12-17 RX ORDER — MODAFINIL 200 MG/1
TABLET ORAL
Qty: 90 TAB | Refills: 1 | Status: SHIPPED | OUTPATIENT
Start: 2020-12-17 | End: 2021-07-22

## 2020-12-22 DIAGNOSIS — G35 MS (MULTIPLE SCLEROSIS) (HCC): ICD-10-CM

## 2020-12-22 RX ORDER — RENAGEL 800 MG/1
1 TABLET ORAL DAILY
Qty: 30 TAB | Refills: 11 | Status: SHIPPED | OUTPATIENT
Start: 2020-12-22 | End: 2020-12-24

## 2021-01-20 ENCOUNTER — HOSPITAL ENCOUNTER (OUTPATIENT)
Dept: LAB | Facility: MEDICAL CENTER | Age: 51
End: 2021-01-20
Attending: NURSE PRACTITIONER
Payer: COMMERCIAL

## 2021-01-20 DIAGNOSIS — D70.8 OTHER NEUTROPENIA (HCC): ICD-10-CM

## 2021-01-20 LAB
BASOPHILS # BLD AUTO: 1.4 % (ref 0–1.8)
BASOPHILS # BLD: 0.05 K/UL (ref 0–0.12)
CHOLEST SERPL-MCNC: 242 MG/DL (ref 100–199)
EOSINOPHIL # BLD AUTO: 0.11 K/UL (ref 0–0.51)
EOSINOPHIL NFR BLD: 3 % (ref 0–6.9)
ERYTHROCYTE [DISTWIDTH] IN BLOOD BY AUTOMATED COUNT: 47.5 FL (ref 35.9–50)
FASTING STATUS PATIENT QL REPORTED: NORMAL
HCT VFR BLD AUTO: 41.8 % (ref 37–47)
HDLC SERPL-MCNC: 78 MG/DL
HGB BLD-MCNC: 13.4 G/DL (ref 12–16)
IMM GRANULOCYTES # BLD AUTO: 0.01 K/UL (ref 0–0.11)
IMM GRANULOCYTES NFR BLD AUTO: 0.3 % (ref 0–0.9)
LDLC SERPL CALC-MCNC: 146 MG/DL
LYMPHOCYTES # BLD AUTO: 1.83 K/UL (ref 1–4.8)
LYMPHOCYTES NFR BLD: 50 % (ref 22–41)
MCH RBC QN AUTO: 33.2 PG (ref 27–33)
MCHC RBC AUTO-ENTMCNC: 32.1 G/DL (ref 33.6–35)
MCV RBC AUTO: 103.5 FL (ref 81.4–97.8)
MONOCYTES # BLD AUTO: 0.39 K/UL (ref 0–0.85)
MONOCYTES NFR BLD AUTO: 10.7 % (ref 0–13.4)
NEUTROPHILS # BLD AUTO: 1.27 K/UL (ref 2–7.15)
NEUTROPHILS NFR BLD: 34.6 % (ref 44–72)
NRBC # BLD AUTO: 0 K/UL
NRBC BLD-RTO: 0 /100 WBC
PLATELET # BLD AUTO: 296 K/UL (ref 164–446)
PMV BLD AUTO: 10.7 FL (ref 9–12.9)
RBC # BLD AUTO: 4.04 M/UL (ref 4.2–5.4)
TRIGL SERPL-MCNC: 91 MG/DL (ref 0–149)
WBC # BLD AUTO: 3.7 K/UL (ref 4.8–10.8)

## 2021-01-20 PROCEDURE — 85025 COMPLETE CBC W/AUTO DIFF WBC: CPT

## 2021-01-20 PROCEDURE — 80061 LIPID PANEL: CPT

## 2021-01-20 PROCEDURE — 36415 COLL VENOUS BLD VENIPUNCTURE: CPT

## 2021-01-27 ENCOUNTER — TELEMEDICINE (OUTPATIENT)
Dept: MEDICAL GROUP | Facility: PHYSICIAN GROUP | Age: 51
End: 2021-01-27
Payer: COMMERCIAL

## 2021-01-27 VITALS — WEIGHT: 140 LBS | RESPIRATION RATE: 12 BRPM | BODY MASS INDEX: 23.9 KG/M2 | HEIGHT: 64 IN

## 2021-01-27 DIAGNOSIS — Z12.11 COLON CANCER SCREENING: ICD-10-CM

## 2021-01-27 DIAGNOSIS — M25.561 ACUTE PAIN OF RIGHT KNEE: ICD-10-CM

## 2021-01-27 DIAGNOSIS — G35 MULTIPLE SCLEROSIS (HCC): Chronic | ICD-10-CM

## 2021-01-27 DIAGNOSIS — E78.2 MIXED HYPERLIPIDEMIA: ICD-10-CM

## 2021-01-27 DIAGNOSIS — R71.8 ELEVATED MCV: ICD-10-CM

## 2021-01-27 DIAGNOSIS — E55.9 VITAMIN D DEFICIENCY: ICD-10-CM

## 2021-01-27 DIAGNOSIS — N30.10 CHRONIC INTERSTITIAL CYSTITIS: ICD-10-CM

## 2021-01-27 DIAGNOSIS — G43.009 MIGRAINE WITHOUT AURA AND WITHOUT STATUS MIGRAINOSUS, NOT INTRACTABLE: ICD-10-CM

## 2021-01-27 DIAGNOSIS — Z12.31 ENCOUNTER FOR SCREENING MAMMOGRAM FOR MALIGNANT NEOPLASM OF BREAST: ICD-10-CM

## 2021-01-27 PROBLEM — M25.569 KNEE PAIN: Status: ACTIVE | Noted: 2021-01-27

## 2021-01-27 PROCEDURE — 99214 OFFICE O/P EST MOD 30 MIN: CPT | Performed by: INTERNAL MEDICINE

## 2021-01-27 RX ORDER — AMITRIPTYLINE HYDROCHLORIDE 25 MG/1
25 TABLET, FILM COATED ORAL
Qty: 90 TAB | Refills: 3 | Status: SHIPPED | OUTPATIENT
Start: 2021-01-27 | End: 2022-02-08

## 2021-01-27 ASSESSMENT — PATIENT HEALTH QUESTIONNAIRE - PHQ9: CLINICAL INTERPRETATION OF PHQ2 SCORE: 0

## 2021-01-27 ASSESSMENT — FIBROSIS 4 INDEX: FIB4 SCORE: 0.79

## 2021-01-27 NOTE — ASSESSMENT & PLAN NOTE
This is a chronic problem.  The patient states her symptoms are well controlled on amitriptyline 25 mg and pentosan 100 mg 3 times daily .  She has not required any phenazopyridine for her symptoms.

## 2021-01-27 NOTE — ASSESSMENT & PLAN NOTE
The patient reports any pain.  She has been using Advil and wearing her brace, both of which helped.  She is requesting a referral to physical therapy.

## 2021-01-27 NOTE — ASSESSMENT & PLAN NOTE
This is a chronic problem.  The patient is followed by Dr. Jean Marie Shelley.  She was diagnosed June 2017.  She has no had no flares requiring hospitalization since that time.  Her main symptoms are fatigue and leg numbness.  She is on Aubagio 14 mg daily.  She also takes modafinil 200 mg for MS associated fatigue.

## 2021-01-27 NOTE — ASSESSMENT & PLAN NOTE
This is a chronic problem.  She takes rizatriptan 10 mg or zolmitriptan 5 mg nasal spray as needed for her migraines.  She states she typically gets them at night and the abortive treatments work effectively.  She has tried amitriptyline suppressive therapy but was unable to tolerate the dose required.

## 2021-01-27 NOTE — PROGRESS NOTES
"Virtual Visit: Established Patient   This visit was conducted via {Platform used:42311::\"Zoom\"} using secure and encrypted videoconferencing technology. The patient was in a private location in the state of {State:[address]::\"[place]\"}.    The patient's identity was confirmed and verbal consent was obtained for this virtual visit.    Subjective:   CC:   Chief Complaint   Patient presents with   • Establish Care       Ct Montalvo is a 50 y.o. female presenting for evaluation and management of:    ***  The 10-year ASCVD risk score (Sarahyulia LIGHT Jr., et al., 2013) is: 0.7%      ROS ***  Denies any recent fevers or chills. No nausea or vomiting. No chest pains or shortness of breath.     Allergies   Allergen Reactions   • Sulfa Drugs Unspecified     Internal burning sensation       Current medicines (including changes today)  Current Outpatient Medications   Medication Sig Dispense Refill   • AUBAGIO 14 MG Tab TAKE 1 TABLET BY MOUTH EVERY DAY 90 Tab 3   • modafinil (PROVIGIL) 200 MG Tab TAKE 1 TABLET BY MOUTH EVERY DAY 90 Tab 1   • montelukast (SINGULAIR) 10 MG Tab Take 1 Tab by mouth every day. N THE EVENING 90 Tab 0   • Zinc 50 MG Cap Take 50 mg by mouth every day.     • Cyanocobalamin (VITAMIN B-12) 1000 MCG Tab Take 1,000 mcg by mouth every day.     • zolmitriptan (ZOMIG) 5 MG nasal solution SPRAY 1 SPRAY IN NOSE ONCE AS NEEDED FOR HEADACHE FOR UP TO 1 DOSE. 6 Each 4   • rizatriptan (MAXALT) 10 MG tablet 1 tab at headache onset; repeat every hour as needed up to #3 tab in 24 hours 10 Tab 11   • amitriptyline (ELAVIL) 25 MG Tab TAKE 1/2 TO 1 TABLET BY MOUTH AT BEDTIME AS NEEDED 90 Tab 1   • mometasone (NASONEX) 50 MCG/ACT nasal spray SPRAY 2 SPRAYS IN EACH NOSTRIL TWICE A DAY. 1 Each 4   • Nutritional Supplements (JUICE PLUS FIBRE PO) Take  by mouth.     • Ascorbic Acid (VITAMIN C) 1000 MG Tab Take  by mouth.     • pentosan (ELMIRON) 100 MG Cap TAKE 1 CAPSULE 3 TIMES DAILY BEFORE MEALS 270 Cap 3   • acyclovir " "(ZOVIRAX) 400 MG tablet TAKE 2 TABLETS BY MOUTH TWICE A DAY FOR 5 DAYS 60 Tab 0   • phenazopyridine (PYRIDIUM) 200 MG Tab Take 1 Tab by mouth 3 times a day as needed. 6 Tab 11   • vitamin D (CHOLECALCIFEROL) 1000 UNIT Tab Take  by mouth every day.     • cetirizine (ZYRTEC) 10 MG Tab Take 10 mg by mouth every day. Half daily       No current facility-administered medications for this visit.        Patient Active Problem List    Diagnosis Date Noted   • Seasonal allergies 12/04/2020     Priority: Medium   • Other neutropenia (HCC) 06/18/2020     Priority: Medium   • Hyperlipidemia 07/22/2019     Priority: Medium   • MS (multiple sclerosis) ([facility]) 06/21/2017     Priority: Medium   • Migraine without aura, not intractable 06/14/2016     Priority: Medium   • IC (interstitial cystitis) 06/14/2016     Priority: Medium   • Herpes labialis 06/15/2016     Priority: Low       Family History   Problem Relation Age of Onset   • Heart Disease Mother         chf   • Stroke Mother    • Cancer Mother         minor skin cancer   • Hyperlipidemia Brother    • Cancer Maternal Grandmother         brca-surv; gyn ca-surviv   • Diabetes Paternal Grandmother        She  has a past medical history of GERD (gastroesophageal reflux disease), Hyperlipidemia (7/22/2019), Kidney disease, Muscle disorder, and Thyroid disease. She also has no past medical history of Anxiety, Arrhythmia, Asthma, Blood transfusion without reported diagnosis, Cancer ([facility]), CHF (congestive heart failure) ([facility]), Clotting disorder (HCC), COPD (chronic obstructive pulmonary disease) ([facility]), Depression, Diabetes ([facility]), Diabetic neuropathy (HCC), Goiter, Heart attack (HCC), Heart murmur, Hypertension, Osteoporosis, Pulmonary emphysema (HCC), Seizure (HCC), Stroke (HCC), Substance abuse ([facility]), or Tuberculosis.  She  has no past surgical history on file.       Objective:   Resp 12   Ht 1.626 m (5' 4\")   Wt 63.5 kg (140 lb)   BMI 24.03 kg/m²     Physical " Exam:***  Constitutional: Alert, no distress, well-groomed.  Skin: No rashes in visible areas.  Eye: Round. Conjunctiva clear, lids normal. No icterus.   ENMT: Lips pink without lesions, good dentition, moist mucous membranes. Phonation normal.  Neck: No masses, no thyromegaly. Moves freely without pain.  Respiratory: Unlabored respiratory effort, no cough or audible wheeze  Psych: Alert and oriented x3, normal affect and mood.       Assessment and Plan:   The following treatment plan was discussed:     1. Colon cancer screening  - REFERRAL TO GI FOR COLONOSCOPY    2. Encounter for screening mammogram for malignant neoplasm of breast  - MA-SCREENING MAMMO BILAT W/TOMOSYNTHESIS W/CAD; Future        Follow-up: No follow-ups on file.
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
## 2021-02-01 ENCOUNTER — TELEPHONE (OUTPATIENT)
Dept: NEUROLOGY | Facility: MEDICAL CENTER | Age: 51
End: 2021-02-01

## 2021-02-01 NOTE — TELEPHONE ENCOUNTER
Called [employer] in regards to her needing assistance program for medication . Left voicemail for Radha to return call .

## 2021-02-12 ENCOUNTER — TELEPHONE (OUTPATIENT)
Dept: NEUROLOGY | Facility: MEDICAL CENTER | Age: 51
End: 2021-02-12

## 2021-02-12 NOTE — TELEPHONE ENCOUNTER
Tried reaching out to [employer] regarding patients medication . I have not been able to  Reach anyone . Spoke to Aubagio rep she had let me know a payment did go out to the account on the 5th . And that medication was sent out from  .

## 2021-02-25 RX ORDER — MONTELUKAST SODIUM 10 MG/1
TABLET ORAL
Qty: 90 TABLET | Refills: 1 | Status: SHIPPED | OUTPATIENT
Start: 2021-02-25 | End: 2021-05-25 | Stop reason: SDUPTHER

## 2021-02-28 DIAGNOSIS — G43.009 MIGRAINE WITHOUT AURA AND WITHOUT STATUS MIGRAINOSUS, NOT INTRACTABLE: ICD-10-CM

## 2021-03-02 RX ORDER — MOMETASONE FUROATE 50 UG/1
SPRAY, METERED NASAL
Qty: 1 EACH | Refills: 4 | Status: SHIPPED | OUTPATIENT
Start: 2021-03-02 | End: 2021-03-25

## 2021-03-02 RX ORDER — ZOLMITRIPTAN 5 MG/1
SPRAY NASAL
Qty: 6 EACH | Refills: 4 | Status: SHIPPED | OUTPATIENT
Start: 2021-03-02 | End: 2021-08-09

## 2021-03-02 NOTE — TELEPHONE ENCOUNTER
Received request via: Pharmacy    Was the patient seen in the last year in this department? Yes    Does the patient have an active prescription (recently filled or refills available) for medication(s) requested? No     Patient last seen on 10/27/2020 , medication last filled on 10/27/2020.    Patient has a follow up scheduled on 03/10/2021.

## 2021-03-10 ENCOUNTER — OFFICE VISIT (OUTPATIENT)
Dept: NEUROLOGY | Facility: MEDICAL CENTER | Age: 51
End: 2021-03-10
Attending: PSYCHIATRY & NEUROLOGY
Payer: COMMERCIAL

## 2021-03-10 ENCOUNTER — TELEPHONE (OUTPATIENT)
Dept: NEUROLOGY | Facility: MEDICAL CENTER | Age: 51
End: 2021-03-10

## 2021-03-10 ENCOUNTER — TELEPHONE (OUTPATIENT)
Dept: MEDICAL GROUP | Facility: PHYSICIAN GROUP | Age: 51
End: 2021-03-10

## 2021-03-10 VITALS
HEIGHT: 64 IN | TEMPERATURE: 99.2 F | WEIGHT: 141.98 LBS | SYSTOLIC BLOOD PRESSURE: 124 MMHG | OXYGEN SATURATION: 98 % | BODY MASS INDEX: 24.24 KG/M2 | DIASTOLIC BLOOD PRESSURE: 70 MMHG | HEART RATE: 89 BPM

## 2021-03-10 DIAGNOSIS — M79.89 LEFT LEG SWELLING: ICD-10-CM

## 2021-03-10 DIAGNOSIS — G43.009 MIGRAINE WITHOUT AURA AND WITHOUT STATUS MIGRAINOSUS, NOT INTRACTABLE: ICD-10-CM

## 2021-03-10 DIAGNOSIS — G35 MULTIPLE SCLEROSIS (HCC): Primary | Chronic | ICD-10-CM

## 2021-03-10 PROCEDURE — 99215 OFFICE O/P EST HI 40 MIN: CPT | Performed by: PSYCHIATRY & NEUROLOGY

## 2021-03-10 PROCEDURE — 99212 OFFICE O/P EST SF 10 MIN: CPT | Performed by: PSYCHIATRY & NEUROLOGY

## 2021-03-10 ASSESSMENT — ENCOUNTER SYMPTOMS
FOCAL WEAKNESS: 0
MEMORY LOSS: 0
FALLS: 0
SENSORY CHANGE: 0

## 2021-03-10 ASSESSMENT — FIBROSIS 4 INDEX: FIB4 SCORE: 0.79

## 2021-03-10 NOTE — Clinical Note
Anika, I am a little concerned about the asymmetry in size of Ct's legs.  She did show me a picture which was pretty convincing, on exam, there does seem to be some edema in the left which is not present on the right side.  Would you consider getting a vascular study?  I think it very unlikely that her MS is playing a role, I also do not think this is true atrophy of the right leg secondary to something like ALS.  What do you think?  Jean Marie

## 2021-03-10 NOTE — PROGRESS NOTES
Subjective:      Ct Montalvo is a 50 y.o. female who presents with her  Hemanth, as always, for follow-up, with a history of MS, now with concerns about loss of coverage for her Aubagio through her insurance.    HPI    Since last seen in April, 2020, she has had no episodes suggestive of disease relapse.  The left leg can still get heavy, balance only slightly curtailed, though this fluctuates without sustained worsening.  She denies any sensory distortions.  Cognition and cranial nerve functions have always been maintained, she denies changes in bowel or bladder function, she still uses the Pyridium as needed for the latter.  She has not had problems with constrictive sensations across the chest or abdominal wall.  She denies Lhermitte's phenomena.  Fatigue is still treated adequately with Provigil once a day.    On Aubagio 14 mg daily, annual liver profile and CBC have been stable.  She is scheduled for her repeat imaging of the brain and thoracic cord next month, annually since 2017, her burden of disease has been stable, she has not shown signs of active disease with enhancement or ADC abnormalities.    Her insurance company has changed, evidently Aubagio was no longer on a tier 1 coverage, she is concerned about the expense.  If the drug cannot be taken because of this, she has questions about what the other options are.    Her migraines are controlled, she uses Maxalt or Zomig 5 mg nasal solution as needed, she remains on Elavil 25 mg nightly.  The frequency has not increased to the point where she warrants maintenance therapies.    Which she has noted on a continuing basis over the last year or so, is an asymmetry in the size of both lower extremities.  She showed a picture on her phone which did clearly show the asymmetries both proximally and distally in the legs.  Whether the left leg is actually getting bigger or the right one smaller, she does not know, but there is a clear size difference.   "She denies pain, fasciculations in the muscles in either leg, neither leg feels more swollen.  There is no weakness with either of the lower extremities other than the minimal difficulty with the left leg related to her disease.    Medical, surgical and family histories are reviewed, there are no drug allergies.  She is on Provigil 200 mg daily, Aubagio 14 mg daily, Maxalt 10 mg and Zomig 5 mg nasal spray as needed, Pyridium 200 mg three times a day as needed, Elavil 25 mg nightly, Zovirax 400 mg daily, vitamin D with calcium, vitamin C, B12, and several other supplements as per the record, noncontributory from my standpoint.     Review of Systems   Constitutional: Positive for malaise/fatigue.   Cardiovascular: Negative for leg swelling.   Genitourinary: Positive for urgency.   Musculoskeletal: Negative for falls.   Neurological: Negative for sensory change and focal weakness.   Psychiatric/Behavioral: Negative for memory loss.   All other systems reviewed and are negative.       Objective:     /70 (BP Location: Right arm, Patient Position: Sitting, BP Cuff Size: Adult)   Pulse 89   Temp 37.3 °C (99.2 °F) (Temporal)   Ht 1.626 m (5' 4\")   Wt 64.4 kg (141 lb 15.6 oz)   SpO2 98%   BMI 24.37 kg/m²      Physical Exam    She appears in no acute distress.  Vital signs are stable.  There is no malar rash.  The neck is supple.  Range of motion is full, Lhermitte's phenomena is absent, compression maneuvers are negative.  Cardiac evaluation is unremarkable.  There is some mild pretibial edema in the left lower extremity only, distal pulses are intact.  The feet are both cool to touch.    Fully oriented, there is no aphasia, apraxia, or inattention.    PERRLA/EOMI, visual fields are full to movement detection on confrontation bilaterally, facial movements are symmetric, the tongue and uvula are midline without bulbar dysfunction.  Jaw jerk is present.  Facial sensory perception is intact to temperature, light " touch and pinprick.  Shoulder shrug and head rotation are intact and symmetric.    Musculoskeletal exam reveals normal tone throughout, there is no tremor, asterixis, or drift.  There is clear asymmetry in size between the lower extremities, the right seemingly more scalloped in the thigh like an inverted champagne bottle, but no fasciculations are seen.  Strength assessment reveals only the weakness in the distal left lower extremity, unchanged.  There are no clear asymmetries in reflexes in the lower extremities.    She stands easily, arm swing is symmetric, as she walks there is no clear circumduction in either lower extremity.  Heel, toe, and tandem walking are intact.  Repetitive movements with the left foot are a little slow when compared to the right, but that is the only asymmetry throughout.  Movements are intact in the upper extremities.  There is no appendicular dystaxia with any of the extremities.    Sensory exam is intact to vibration, temperature and pinprick in all four extremities.     Assessment/Plan:     1. MS (multiple sclerosis) (HCC)  Clinically, her disease remains stable, she has been stable on Aubagio for the last 4 years.  There is absolutely no clinical reason why she should be forced to change to a different DMT simply because an insurance company refuses to cover for financial reasons.  Hopefully with a peer-two-peer evaluation, I can get her insurance company to cover the expense of her Aubagio.  We had a long discussion about other treatment options, I also discussed the case with Dr. Gustavo Canas MD, our MS specialist.  Tecfidera would probably be the next best option, going with other, potentially more toxic, drugs is legitimate but still problematic.  We will cross that bridge when we get there.    She will follow through with her MRI imaging studies previously scheduled, we will call her with results if significant, otherwise we can follow-up in 1 year.  She will continue with her  annual liver profile and CBC checks.    As for the asymmetry in size of the lower extremities, I cannot explain this purely on the basis of her MS, there is some edema, this may be part of the issue with the left leg at least, I recommended she talk with Dr. Roque, her primary care physician, about getting some vascular studies done.  If the right leg itself is getting smaller, suspicious for something like motor neuron disease, there is nothing by history to suggest this, there is no family history of myopathic or motor neuron disease as well.  Clinically her examination is benign.    2. Migraine without aura and without status migrainosus, not intractable  Stable, she will continue the Elavil at night as well as her Zomig and Maxalt rescues.    Time: 40-minute spent face-to-face for exam, review, discussion, and education, of this over 50% of the time spent counseling and coordinating care.

## 2021-03-10 NOTE — TELEPHONE ENCOUNTER
Order placed for bilateral lower extremity arterial ultrasound for longstanding leg asymmetry, left greater than right.

## 2021-03-10 NOTE — TELEPHONE ENCOUNTER
Notified the patient on the order for the ultrasound that  placed and gave the patient the number to schedule her appt. for the ultrasound at [facility] [phone]./IS

## 2021-03-11 ENCOUNTER — TELEPHONE (OUTPATIENT)
Dept: NEUROLOGY | Facility: MEDICAL CENTER | Age: 51
End: 2021-03-11

## 2021-03-12 NOTE — TELEPHONE ENCOUNTER
Called Tr, I was informed that this pharmacy plan does not cover any subspecialty drugs such as her Aubagio.  I was informed that [facility] must [facility] and ask for their guidance in medication coverage.  The number there is [phone].

## 2021-03-15 ENCOUNTER — TELEPHONE (OUTPATIENT)
Dept: NEUROLOGY | Facility: MEDICAL CENTER | Age: 51
End: 2021-03-15

## 2021-03-15 NOTE — TELEPHONE ENCOUNTER
Spoke to patient in regards to Dr Dante Shelley . [employer] does not cover any specialty medication . Dr Dante Shelley called to do a pier to pier . However he stated they are not going to cover Aubagio medication . Gave the patient [facility] number to see if she can talk to them about getting discounted rates .

## 2021-03-18 ENCOUNTER — ANCILLARY PROCEDURE (OUTPATIENT)
Dept: VASCULAR LAB | Facility: MEDICAL CENTER | Age: 51
End: 2021-03-18
Attending: INTERNAL MEDICINE
Payer: COMMERCIAL

## 2021-03-18 DIAGNOSIS — Z23 NEED FOR VACCINATION: ICD-10-CM

## 2021-03-18 PROCEDURE — 99999 MODERNA SARS-COV-2 VACCINE: CPT | Performed by: INTERNAL MEDICINE

## 2021-03-19 ENCOUNTER — HOSPITAL ENCOUNTER (OUTPATIENT)
Dept: RADIOLOGY | Facility: MEDICAL CENTER | Age: 51
End: 2021-03-19
Attending: INTERNAL MEDICINE
Payer: COMMERCIAL

## 2021-03-19 DIAGNOSIS — M79.89 LEFT LEG SWELLING: ICD-10-CM

## 2021-03-19 PROCEDURE — 93922 UPR/L XTREMITY ART 2 LEVELS: CPT

## 2021-03-19 NOTE — NON-PROVIDER
[facility]    Pt consented to receiving immunization today.     Osmel Galvin, PharmD

## 2021-03-25 ENCOUNTER — OFFICE VISIT (OUTPATIENT)
Dept: MEDICAL GROUP | Facility: PHYSICIAN GROUP | Age: 51
End: 2021-03-25
Payer: COMMERCIAL

## 2021-03-25 VITALS
HEART RATE: 91 BPM | OXYGEN SATURATION: 97 % | SYSTOLIC BLOOD PRESSURE: 110 MMHG | HEIGHT: 64 IN | BODY MASS INDEX: 24.41 KG/M2 | TEMPERATURE: 99.4 F | WEIGHT: 143 LBS | DIASTOLIC BLOOD PRESSURE: 70 MMHG

## 2021-03-25 DIAGNOSIS — I89.0 LYMPHEDEMA OF LEFT LOWER EXTREMITY: ICD-10-CM

## 2021-03-25 DIAGNOSIS — Z13.29 THYROID DISORDER SCREEN: ICD-10-CM

## 2021-03-25 PROCEDURE — 99214 OFFICE O/P EST MOD 30 MIN: CPT | Performed by: INTERNAL MEDICINE

## 2021-03-25 RX ORDER — MOMETASONE FUROATE 50 UG/1
SPRAY, METERED NASAL
Qty: 1 EACH | Refills: 3 | Status: SHIPPED | OUTPATIENT
Start: 2021-03-25 | End: 2022-05-17

## 2021-03-25 ASSESSMENT — FIBROSIS 4 INDEX: FIB4 SCORE: 0.79

## 2021-03-25 NOTE — PROGRESS NOTES
Subjective:     CC: Follow-up on lower extremity ultrasound    HPI:   Ct Montalvo is a 50 y.o. female presenting for follow-up of a recent lower extremity vascular ultrasound.    The patient reports a year-long history of her left lower extremity increasing in diameter.  Measurements taking by her physical therapist show the following:  RLE 5 inches above the knee 19 inches  LLE 5 inches above the knee 20.75 inches  RLE 2 inches above the knee 16.5 inch  LLE 2 inches above the knee 17.25  Right calf 13.25 inches  Left calf 14 inches    Arterial ultrasound of the lower extremities on 3/19/2021 showed a normal bilateral ankle-brachial index in the common femoral artery with high amplitude and triphasic waveform.     The patient states that it is her left lower extremity that has been affected by the MS, with numbness/tingling as well as intermittent weakness.  But she currently denies overt weakness of the leg.  She denies any antecedent trauma.      Past Medical History:   Diagnosis Date   • GERD (gastroesophageal reflux disease)     no improvement with omeprazole.  on prevacid   • Hyperlipidemia 7/22/2019   • Kidney disease     kidney infection as a child, once only, inpatient   • Muscle disorder    • Thyroid disease     [facility]. no treatments or procedures       Social History     Tobacco Use   • Smoking status: Never Smoker   • Smokeless tobacco: Never Used   Substance Use Topics   • Alcohol use: Yes     Comment: social   • Drug use: No       Current Outpatient Medications Ordered in Epic   Medication Sig Dispense Refill   • zolmitriptan (ZOMIG) 5 MG nasal solution SPRAY 1 SPRAY IN NOSE ONCE A DAY AS NEEDED FOR HEADACHE FOR UP TO 1 DOSE 6 Each 4   • mometasone (NASONEX) 50 MCG/ACT nasal spray SPRAY 2 SPRAYS IN EACH NOSTRIL TWICE A DAY. 1 Each 4   • montelukast (SINGULAIR) 10 MG Tab TAKE 1 TABLET BY MOUTH IN THE EVENING 90 tablet 1   • amitriptyline (ELAVIL) 25 MG Tab Take 1 Tab by mouth every bedtime. 90  "Tab 3   • AUBAGIO 14 MG Tab TAKE 1 TABLET BY MOUTH EVERY DAY 90 Tab 3   • modafinil (PROVIGIL) 200 MG Tab TAKE 1 TABLET BY MOUTH EVERY DAY 90 Tab 1   • Cyanocobalamin (VITAMIN B-12) 1000 MCG Tab Take 1,000 mcg by mouth every day.     • rizatriptan (MAXALT) 10 MG tablet 1 tab at headache onset; repeat every hour as needed up to #3 tab in 24 hours 10 Tab 11   • Nutritional Supplements (JUICE PLUS FIBRE PO) Take  by mouth.     • Ascorbic Acid (VITAMIN C) 1000 MG Tab Take  by mouth.     • pentosan (ELMIRON) 100 MG Cap TAKE 1 CAPSULE 3 TIMES DAILY BEFORE MEALS 270 Cap 3   • acyclovir (ZOVIRAX) 400 MG tablet TAKE 2 TABLETS BY MOUTH TWICE A DAY FOR 5 DAYS 60 Tab 0   • phenazopyridine (PYRIDIUM) 200 MG Tab Take 1 Tab by mouth 3 times a day as needed. 6 Tab 11   • vitamin D (CHOLECALCIFEROL) 1000 UNIT Tab Take  by mouth every day.     • cetirizine (ZYRTEC) 10 MG Tab Take 10 mg by mouth every day. Half daily       No current [facility]-ordered facility-administered medications on file.       Allergies:  Sulfa drugs    Health Maintenance: Completed    ROS:   Gen: no fevers/chills  Pulm: no sob, no cough  CV: no chest pain, no palpitations  GI: no nausea/vomiting, no diarrhea  Neuro: no headaches, no numbness/tingling      Objective:     Exam:  /70   Pulse 91   Temp 37.4 °C (99.4 °F)   Ht 1.626 m (5' 4\")   Wt 64.9 kg (143 lb)   SpO2 97%   BMI 24.55 kg/m²  Body mass index is 24.55 kg/m².    Lower extremity exam:  Skin is warm to touch.  No discoloration.  No edema.  2+ distal pulses bilaterally.  RLE 5 inches above the knee 19 inches  LLE 5 inches above the knee 20.75 inches  RLE 2 inches above the knee 16.5 inch  LLE 2 inches above the knee 17.25  Right calf 13.25 inches  Left calf 14 inches      Assessment & Plan:     50 y.o. female with the following -     Lymphedema of left lower extremity  This is a chronic problem.  Progressive.  Etiology of the asymmetric leg sizes is unclear.  No antecedent trauma.  The leg does " not appear edematous.  Vascular ultrasound was normal.  Query whether this is related to her MS as it is that leg that was initially affected, however Dr. Shelley feels it is unrelated. Will refer to vascular surgery for further evaluation.  May consider EMG/NCS or lymphoscintigraphy studies pending vascular surgery recommendations.  - REFERRAL TO VASCULAR SURGERY  - TSH WITH REFLEX TO FT4; Future      I spent a total of 30  minutes with record review, exam, communication with the patient, communication with other providers, and documentation of this encounter.      Return in about 6 months (around 9/25/2021).    Please note that this dictation was created using voice recognition software. I have made every reasonable attempt to correct obvious errors, but I expect that there are errors of grammar and possibly content that I did not discover before finalizing the note.

## 2021-03-25 NOTE — PROGRESS NOTES
Virtual Visit: Established Patient   This visit was conducted via Zoom using secure and encrypted videoconferencing technology. The patient was in a private location in the state of [place].    The patient's identity was confirmed and verbal consent was obtained for this virtual visit.    Subjective:   CC: Follow-up vascular ultrasound    Ct Montalvo is a 50 y.o. female presenting for follow-up of a recent lower extremity vascular ultrasound.      ROS  Denies any recent fevers or chills. No nausea or vomiting. No chest pains or shortness of breath.     Allergies   Allergen Reactions   • Sulfa Drugs Unspecified     Internal burning sensation       Current medicines (including changes today)  Current Outpatient Medications   Medication Sig Dispense Refill   • zolmitriptan (ZOMIG) 5 MG nasal solution SPRAY 1 SPRAY IN NOSE ONCE A DAY AS NEEDED FOR HEADACHE FOR UP TO 1 DOSE 6 Each 4   • mometasone (NASONEX) 50 MCG/ACT nasal spray SPRAY 2 SPRAYS IN EACH NOSTRIL TWICE A DAY. 1 Each 4   • montelukast (SINGULAIR) 10 MG Tab TAKE 1 TABLET BY MOUTH IN THE EVENING 90 tablet 1   • amitriptyline (ELAVIL) 25 MG Tab Take 1 Tab by mouth every bedtime. 90 Tab 3   • AUBAGIO 14 MG Tab TAKE 1 TABLET BY MOUTH EVERY DAY 90 Tab 3   • modafinil (PROVIGIL) 200 MG Tab TAKE 1 TABLET BY MOUTH EVERY DAY 90 Tab 1   • Zinc 50 MG Cap Take 50 mg by mouth every day.     • Cyanocobalamin (VITAMIN B-12) 1000 MCG Tab Take 1,000 mcg by mouth every day.     • rizatriptan (MAXALT) 10 MG tablet 1 tab at headache onset; repeat every hour as needed up to #3 tab in 24 hours 10 Tab 11   • Nutritional Supplements (JUICE PLUS FIBRE PO) Take  by mouth.     • Ascorbic Acid (VITAMIN C) 1000 MG Tab Take  by mouth.     • pentosan (ELMIRON) 100 MG Cap TAKE 1 CAPSULE 3 TIMES DAILY BEFORE MEALS 270 Cap 3   • acyclovir (ZOVIRAX) 400 MG tablet TAKE 2 TABLETS BY MOUTH TWICE A DAY FOR 5 DAYS 60 Tab 0   • phenazopyridine (PYRIDIUM) 200 MG Tab Take 1 Tab by mouth 3  times a day as needed. 6 Tab 11   • vitamin D (CHOLECALCIFEROL) 1000 UNIT Tab Take  by mouth every day.     • cetirizine (ZYRTEC) 10 MG Tab Take 10 mg by mouth every day. Half daily       No current facility-administered medications for this visit.       Patient Active Problem List    Diagnosis Date Noted   • Seasonal allergies 12/04/2020     Priority: Medium   • Other neutropenia ([facility]) 06/18/2020     Priority: Medium   • Hyperlipidemia 07/22/2019     Priority: Medium   • MS (multiple sclerosis) ([facility]) 06/21/2017     Priority: Medium   • Migraine without aura, not intractable 06/14/2016     Priority: Medium   • IC (interstitial cystitis) 06/14/2016     Priority: Medium   • Herpes labialis 06/15/2016     Priority: Low   • Knee pain 01/27/2021       Family History   Problem Relation Age of Onset   • Heart Disease Mother         chf   • Stroke Mother    • Cancer Mother         minor skin cancer   • Hyperlipidemia Brother    • Cancer Maternal Grandmother         brca-surv; gyn ca-surviv   • Diabetes Paternal Grandmother        She  has a past medical history of GERD (gastroesophageal reflux disease), Hyperlipidemia (7/22/2019), Kidney disease, Muscle disorder, and Thyroid disease. She also has no past medical history of Anxiety, Arrhythmia, Asthma, Blood transfusion without reported diagnosis, Cancer ([facility]), CHF (congestive heart failure) ([facility]), Clotting disorder ([facility]), COPD (chronic obstructive pulmonary disease) ([facility]), Depression, Diabetes ([facility]), Diabetic neuropathy ([facility]), Goiter, Heart attack ([facility]), Heart murmur, Hypertension, Osteoporosis, Pulmonary emphysema ([facility]), Seizure ([facility]), Stroke ([facility]), Substance abuse ([facility]), or Tuberculosis.  She  has no past surgical history on file.       Objective:   There were no vitals taken for this visit.    Physical Exam:  Constitutional: Alert, no distress, well-groomed.  Skin: No rashes in visible areas.  Eye: Round. Conjunctiva clear, lids normal. No icterus.   ENMT: Lips pink  without lesions, good dentition, moist mucous membranes. Phonation normal.  Neck: No masses, no thyromegaly. Moves freely without pain.  Respiratory: Unlabored respiratory effort, no cough or audible wheeze  Psych: Alert and oriented x3, normal affect and mood.       Assessment and Plan:   The following treatment plan was discussed:     Leg size asymmetry  Arterial ultrasound of the lower extremities on 3/19/2021 showed a normal bilateral ankle-brachial index in the common femoral artery with high amplitude and triphasic waveform.    Which she has noted on a continuing basis over the last year or so, is an asymmetry in the size of both lower extremities.  She showed a picture on her phone which did clearly show the asymmetries both proximally and distally in the legs.  Whether the left leg is actually getting bigger or the right one smaller, she does not know, but there is a clear size difference.  She denies pain, fasciculations in the muscles in either leg, neither leg feels more swollen.  There is no weakness with either of the lower extremities other than the minimal difficulty with the left leg related to her disease.    Follow-up: No follow-ups on file.     Please note that this dictation was created using voice recognition software. I have made every reasonable attempt to correct obvious errors, but I expect that there are errors of grammar and possibly content that I did not discover before finalizing the note.

## 2021-03-25 NOTE — PROGRESS NOTES
Virtual Visit: Established Patient   This visit was conducted via Zoom using secure and encrypted videoconferencing technology. The patient was in a private location in the state of [place].    The patient's identity was confirmed and verbal consent was obtained for this virtual visit.    Subjective:   CC: Follow-up vascular ultrasound    Ct Montalvo is a 50 y.o. female presenting for follow-up of a recent lower extremity vascular ultrasound.    The patient reports a year-long history of her left lower extremity increasing in diameter.  Measurements taking by her physical therapist show the following:  RLE 5 inches above the knee 19 inches  LLE 5 inches above the knee 20.75 inches  RLE 2 inches above the knee 16.5 inch  LLE 2 inches above the knee 17.25  Right calf 13.25 inches  Left calf 14 inches    Arterial ultrasound of the lower extremities on 3/19/2021 showed a normal bilateral ankle-brachial index in the common femoral artery with high amplitude and triphasic waveform.     The patient states that it is her left lower extremity that has been affected by the MS, with numbness/tingling as well as intermittent weakness.  But she currently denies overt weakness of the leg.  She denies any antecedent trauma.    ROS  Denies any recent fevers or chills. No nausea or vomiting. No chest pains or shortness of breath.     Allergies   Allergen Reactions   • Sulfa Drugs Unspecified     Internal burning sensation       Current medicines (including changes today)  Current Outpatient Medications   Medication Sig Dispense Refill   • zolmitriptan (ZOMIG) 5 MG nasal solution SPRAY 1 SPRAY IN NOSE ONCE A DAY AS NEEDED FOR HEADACHE FOR UP TO 1 DOSE 6 Each 4   • mometasone (NASONEX) 50 MCG/ACT nasal spray SPRAY 2 SPRAYS IN EACH NOSTRIL TWICE A DAY. 1 Each 4   • montelukast (SINGULAIR) 10 MG Tab TAKE 1 TABLET BY MOUTH IN THE EVENING 90 tablet 1   • amitriptyline (ELAVIL) 25 MG Tab Take 1 Tab by mouth every bedtime. 90 Tab 3    • AUBAGIO 14 MG Tab TAKE 1 TABLET BY MOUTH EVERY DAY 90 Tab 3   • modafinil (PROVIGIL) 200 MG Tab TAKE 1 TABLET BY MOUTH EVERY DAY 90 Tab 1   • Cyanocobalamin (VITAMIN B-12) 1000 MCG Tab Take 1,000 mcg by mouth every day.     • rizatriptan (MAXALT) 10 MG tablet 1 tab at headache onset; repeat every hour as needed up to #3 tab in 24 hours 10 Tab 11   • Nutritional Supplements (JUICE PLUS FIBRE PO) Take  by mouth.     • Ascorbic Acid (VITAMIN C) 1000 MG Tab Take  by mouth.     • pentosan (ELMIRON) 100 MG Cap TAKE 1 CAPSULE 3 TIMES DAILY BEFORE MEALS 270 Cap 3   • acyclovir (ZOVIRAX) 400 MG tablet TAKE 2 TABLETS BY MOUTH TWICE A DAY FOR 5 DAYS 60 Tab 0   • phenazopyridine (PYRIDIUM) 200 MG Tab Take 1 Tab by mouth 3 times a day as needed. 6 Tab 11   • vitamin D (CHOLECALCIFEROL) 1000 UNIT Tab Take  by mouth every day.     • cetirizine (ZYRTEC) 10 MG Tab Take 10 mg by mouth every day. Half daily     • Zinc 50 MG Cap Take 50 mg by mouth every day.       No current facility-administered medications for this visit.       Patient Active Problem List    Diagnosis Date Noted   • Seasonal allergies 12/04/2020     Priority: Medium   • Other neutropenia ([facility]) 06/18/2020     Priority: Medium   • Hyperlipidemia 07/22/2019     Priority: Medium   • MS (multiple sclerosis) ([facility]) 06/21/2017     Priority: Medium   • Migraine without aura, not intractable 06/14/2016     Priority: Medium   • IC (interstitial cystitis) 06/14/2016     Priority: Medium   • Herpes labialis 06/15/2016     Priority: Low   • Knee pain 01/27/2021       Family History   Problem Relation Age of Onset   • Heart Disease Mother         chf   • Stroke Mother    • Cancer Mother         minor skin cancer   • Hyperlipidemia Brother    • Cancer Maternal Grandmother         brca-surv; gyn ca-surviv   • Diabetes Paternal Grandmother        She  has a past medical history of GERD (gastroesophageal reflux disease), Hyperlipidemia (7/22/2019), Kidney disease, Muscle disorder,  "and Thyroid disease. She also has no past medical history of Anxiety, Arrhythmia, Asthma, Blood transfusion without reported diagnosis, Cancer (HCC), CHF (congestive heart failure) (HCC), Clotting disorder (HCC), COPD (chronic obstructive pulmonary disease) (HCC), Depression, Diabetes (HCC), Diabetic neuropathy (HCC), Goiter, Heart attack (HCC), Heart murmur, Hypertension, Osteoporosis, Pulmonary emphysema (HCC), Seizure (HCC), Stroke (HCC), Substance abuse (HCC), or Tuberculosis.  She  has no past surgical history on file.       Objective:   /70   Pulse 91   Temp 37.4 °C (99.4 °F)   Ht 1.626 m (5' 4\")   Wt 64.9 kg (143 lb)   SpO2 97%   BMI 24.55 kg/m²     Physical Exam:  Constitutional: Alert, no distress, well-groomed.  Lower extremity exam:  Skin is warm to touch.  No discoloration.  No edema.  2+ distal pulses bilaterally.  RLE 5 inches above the knee 19 inches  LLE 5 inches above the knee 20.75 inches  RLE 2 inches above the knee 16.5 inch  LLE 2 inches above the knee 17.25  Right calf 13.25 inches  Left calf 14 inches    Assessment and Plan:   The following treatment plan was discussed:     Leg size asymmetry  Arterial ultrasound of the lower extremities on 3/19/2021 showed a normal bilateral ankle-brachial index in the common femoral artery with high amplitude and triphasic waveform.    Lymphedema of left lower extremity  This is a chronic problem.  Progressive.  Etiology of the asymmetric leg sizes is unclear.  No antecedent trauma.  The leg does not appear edematous.  Vascular ultrasound was normal.  Query whether this is related to her MS as it is that leg that was initially affected, however Dr. Shelley feels it is unrelated. Will refer to vascular surgery for further evaluation.  May consider EMG/NCS or lymphoscintigraphy studies pending vascular surgery recommendations.  - REFERRAL TO VASCULAR SURGERY  - TSH WITH REFLEX TO FT4; Future    My total time spent caring for the patient on the day " of the encounter was 30 minutes.   This does not include time spent on separately billable procedures/tests.      Follow-up: Return in about 6 months (around 9/25/2021).     Please note that this dictation was created using voice recognition software. I have made every reasonable attempt to correct obvious errors, but I expect that there are errors of grammar and possibly content that I did not discover before finalizing the note.

## 2021-03-29 ENCOUNTER — HOSPITAL ENCOUNTER (OUTPATIENT)
Dept: LAB | Facility: MEDICAL CENTER | Age: 51
End: 2021-03-29
Attending: STUDENT IN AN ORGANIZED HEALTH CARE EDUCATION/TRAINING PROGRAM
Payer: COMMERCIAL

## 2021-03-29 DIAGNOSIS — Z13.29 THYROID DISORDER SCREEN: ICD-10-CM

## 2021-03-29 LAB — TSH SERPL DL<=0.005 MIU/L-ACNC: 2.02 UIU/ML (ref 0.38–5.33)

## 2021-03-29 PROCEDURE — 84443 ASSAY THYROID STIM HORMONE: CPT

## 2021-03-29 PROCEDURE — 36415 COLL VENOUS BLD VENIPUNCTURE: CPT

## 2021-04-07 DIAGNOSIS — I89.0 LYMPHEDEMA OF LEFT LOWER EXTREMITY: ICD-10-CM

## 2021-04-14 NOTE — TELEPHONE ENCOUNTER
Received request via: Pharmacy    Was the patient seen in the last year in this department? Yes    Does the patient have an active prescription (recently filled or refills available) for medication(s) requested? No     Requested Prescriptions     Pending Prescriptions Disp Refills   • pentosan (ELMIRON) 100 MG Cap 270 capsule 3     Sig: TAKE 1 CAPSULE 3 TIMES DAILY BEFORE MEALS

## 2021-04-17 ENCOUNTER — IMMUNIZATION (OUTPATIENT)
Dept: FAMILY PLANNING/WOMEN'S HEALTH CLINIC | Facility: IMMUNIZATION CENTER | Age: 51
End: 2021-04-17
Attending: INTERNAL MEDICINE
Payer: COMMERCIAL

## 2021-04-17 DIAGNOSIS — Z23 ENCOUNTER FOR VACCINATION: Primary | ICD-10-CM

## 2021-04-17 PROCEDURE — 91301 MODERNA SARS-COV-2 VACCINE: CPT

## 2021-04-17 PROCEDURE — 0012A MODERNA SARS-COV-2 VACCINE: CPT

## 2021-04-27 ENCOUNTER — HOSPITAL ENCOUNTER (OUTPATIENT)
Dept: RADIOLOGY | Facility: MEDICAL CENTER | Age: 51
End: 2021-04-27
Attending: PSYCHIATRY & NEUROLOGY
Payer: COMMERCIAL

## 2021-04-27 ENCOUNTER — HOSPITAL ENCOUNTER (OUTPATIENT)
Dept: RADIOLOGY | Facility: MEDICAL CENTER | Age: 51
End: 2021-04-27
Attending: INTERNAL MEDICINE
Payer: COMMERCIAL

## 2021-04-27 DIAGNOSIS — G35 MULTIPLE SCLEROSIS (HCC): ICD-10-CM

## 2021-04-27 DIAGNOSIS — Z12.31 ENCOUNTER FOR SCREENING MAMMOGRAM FOR MALIGNANT NEOPLASM OF BREAST: ICD-10-CM

## 2021-04-27 PROCEDURE — 70553 MRI BRAIN STEM W/O & W/DYE: CPT

## 2021-04-27 PROCEDURE — 77063 BREAST TOMOSYNTHESIS BI: CPT

## 2021-04-27 PROCEDURE — 72157 MRI CHEST SPINE W/O & W/DYE: CPT

## 2021-04-27 PROCEDURE — 700117 HCHG RX CONTRAST REV CODE 255: Performed by: PSYCHIATRY & NEUROLOGY

## 2021-04-27 PROCEDURE — A9576 INJ PROHANCE MULTIPACK: HCPCS | Performed by: PSYCHIATRY & NEUROLOGY

## 2021-04-27 RX ADMIN — GADOTERIDOL 13 ML: 279.3 INJECTION, SOLUTION INTRAVENOUS at 14:06

## 2021-05-19 DIAGNOSIS — M25.561 CHRONIC PAIN OF RIGHT KNEE: ICD-10-CM

## 2021-05-19 DIAGNOSIS — G89.29 CHRONIC PAIN OF RIGHT KNEE: ICD-10-CM

## 2021-05-20 ENCOUNTER — OFFICE VISIT (OUTPATIENT)
Dept: VASCULAR LAB | Facility: MEDICAL CENTER | Age: 51
End: 2021-05-20
Attending: FAMILY MEDICINE
Payer: COMMERCIAL

## 2021-05-20 VITALS
SYSTOLIC BLOOD PRESSURE: 101 MMHG | HEART RATE: 98 BPM | BODY MASS INDEX: 24.59 KG/M2 | HEIGHT: 64 IN | DIASTOLIC BLOOD PRESSURE: 68 MMHG | WEIGHT: 144 LBS

## 2021-05-20 DIAGNOSIS — I89.0 LYMPHEDEMA, NOT ELSEWHERE CLASSIFIED: ICD-10-CM

## 2021-05-20 DIAGNOSIS — E78.5 HYPERLIPIDEMIA, UNSPECIFIED HYPERLIPIDEMIA TYPE: ICD-10-CM

## 2021-05-20 DIAGNOSIS — M79.89 LEG SWELLING: ICD-10-CM

## 2021-05-20 PROCEDURE — 99204 OFFICE O/P NEW MOD 45 MIN: CPT | Performed by: FAMILY MEDICINE

## 2021-05-20 PROCEDURE — 99212 OFFICE O/P EST SF 10 MIN: CPT

## 2021-05-20 ASSESSMENT — ENCOUNTER SYMPTOMS
CHILLS: 0
CLAUDICATION: 0
HEMOPTYSIS: 0
TREMORS: 0
SEIZURES: 0
WEAKNESS: 0
ABDOMINAL PAIN: 0
SENSORY CHANGE: 0
MYALGIAS: 0
SHORTNESS OF BREATH: 0
FEVER: 0
ORTHOPNEA: 0
PALPITATIONS: 0
FOCAL WEAKNESS: 0
HEADACHES: 0
VOMITING: 0
COUGH: 0
DIARRHEA: 0
WHEEZING: 0
BRUISES/BLEEDS EASILY: 0
TINGLING: 0
DIZZINESS: 0
NAUSEA: 0

## 2021-05-20 ASSESSMENT — FIBROSIS 4 INDEX: FIB4 SCORE: 0.79

## 2021-05-20 NOTE — PROGRESS NOTES
INITIAL VASCULAR MEDICINE VISIT  Subjective:   Ct Montalvo is a 50 y.o. y.o. female  who presents today 05/20/21  for   Chief Complaint   Patient presents with   • Office Visit     initially referred by Anika Roque M.D. for eval and med mgmt of possible lymphedema      HPI:    EDEMA:  Current symptoms/pattern:   Acute/chronic? Chronic, 2019  Laterality? Left calf, thigh  Distribution? Lower   Timing? No changes during the day    Improve with elevation? no   Pain?  None    Other assoc sx? No swelling, no other sx   Edema-causing conditions:   Increase hydrostatic pressure (CHF, cor pulm, Na retention, preg, idiopathic, fluid overload, cirrhosis, pulm edema, VTE, CVI): none  Decreased oncotic pressure (renal, hepatic, enteropathic): none  Increased insterstitial oncotic pressure (hypothyroidism): none  Decreased lymphatic drainage (primary vs secondary): none  Increased capillary permeability (idiopathic, trauma, burns, inflammation/sepsis, angioedema/all rxn, T2D): none  Meds possibly contributing to edema:     BP meds (CCB, BB, clonidine, methyldopa, vasodilators): no   NSAIDs: no   Hormones /steroids/OCPs: no  DM (insulin, pioglitazone): no   Antidepressants (MAOI, trazodone): no   Anticonvulsants (gabapentin, lyrica): no   Antiparkinsonian: no   Antineoplastic: no  Prior work-up:  MILES normal, no prior VR duplex or lymphoscint eval   Tx attempted to date:    Compression? None    Diuretics? None    Other? PT for knee, not specific lymphedema eval     MS:   Overall stable, reports initial presention was LLE weakness that is now stable.  Reports no injury to leg.  Has mild R knee pain.    No new s/s.  Seeing neurology routinely.  Had prior MRI brain and Tspine as noted below.   Tolerating meds.     HTN:  No prior dx or meds.     Hyperlipidemia:    Noted to have elevated LDL-C,  No sx or meds.   Hx of clinical ASCVD events: None    Antiplatelet/anticoagulation:   No    Type 2 DM:  No     CKD:     No     Sleeping disorder/EMA:   No     Hypothyroidism:   No     Past Medical History:   Diagnosis Date   • GERD (gastroesophageal reflux disease)     no improvement with omeprazole.  on prevacid   • Hyperlipidemia 7/22/2019   • Kidney disease     kidney infection as a child, once only, inpatient   • Muscle disorder    • Thyroid disease     RUST. no treatments or procedures     History reviewed. No pertinent surgical history.     Current Outpatient Medications:   •  pentosan, TAKE 1 CAPSULE 3 TIMES DAILY BEFORE MEALS  •  mometasone, SPRAY 2 SPRAYS IN EACH NOSTRIL TWICE A DAY.  •  zolmitriptan, SPRAY 1 SPRAY IN NOSE ONCE A DAY AS NEEDED FOR HEADACHE FOR UP TO 1 DOSE  •  montelukast, TAKE 1 TABLET BY MOUTH IN THE EVENING  •  amitriptyline, 25 mg, Oral, QHS  •  Aubagio, TAKE 1 TABLET BY MOUTH EVERY DAY  •  modafinil, TAKE 1 TABLET BY MOUTH EVERY DAY  •  vitamin B-12, 1,000 mcg, Oral, DAILY  •  rizatriptan, 1 tab at headache onset; repeat every hour as needed up to #3 tab in 24 hours  •  Nutritional Supplements (JUICE PLUS FIBRE PO), Take  by mouth.  •  Vitamin C, Take  by mouth.  •  acyclovir, TAKE 2 TABLETS BY MOUTH TWICE A DAY FOR 5 DAYS  •  phenazopyridine, 200 mg, Oral, TID PRN  •  vitamin D, Take  by mouth every day.  •  cetirizine, 10 mg, Oral, DAILY   Allergies   Allergen Reactions   • Sulfa Drugs Unspecified     Internal burning sensation     Family History   Problem Relation Age of Onset   • Heart Disease Mother         chf   • Stroke Mother    • Cancer Mother         minor skin cancer   • Hyperlipidemia Brother    • Cancer Maternal Grandmother         brca-surv; gyn ca-surviv   • Diabetes Paternal Grandmother       Social History     Tobacco Use   • Smoking status: Never Smoker   • Smokeless tobacco: Never Used   Vaping Use   • Vaping Use: Never used   Substance Use Topics   • Alcohol use: Yes     Comment: social   • Drug use: No     Review of Systems   Constitutional: Negative for chills, fever and  "malaise/fatigue.   Respiratory: Negative for cough, hemoptysis, shortness of breath and wheezing.    Cardiovascular: Positive for leg swelling (LEFT only ). Negative for chest pain, palpitations, orthopnea and claudication.   Gastrointestinal: Negative for abdominal pain, diarrhea, nausea and vomiting.   Musculoskeletal: Positive for joint pain (R knee, medial). Negative for myalgias.   Skin: Negative for itching and rash.   Neurological: Negative for dizziness, tingling, tremors, sensory change, focal weakness, seizures, weakness and headaches.   Endo/Heme/Allergies: Does not bruise/bleed easily.      Objective:     Vitals:    05/20/21 1516   BP: 101/68   BP Location: Left arm   Patient Position: Sitting   BP Cuff Size: Adult   Pulse: 98   Weight: 65.3 kg (144 lb)   Height: 1.626 m (5' 4\")      BP Readings from Last 5 Encounters:   05/20/21 101/68   03/25/21 110/70   03/10/21 124/70   10/27/20 102/60   07/22/19 104/66      Body mass index is 24.72 kg/m².  Physical Exam  Vitals reviewed.   Constitutional:       Appearance: Normal appearance.   HENT:      Head: Normocephalic and atraumatic.      Nose: Nose normal.      Mouth/Throat:      Mouth: Mucous membranes are moist.      Pharynx: Oropharynx is clear.   Eyes:      Extraocular Movements: Extraocular movements intact.      Conjunctiva/sclera: Conjunctivae normal.   Cardiovascular:      Rate and Rhythm: Normal rate and regular rhythm.      Pulses: Normal pulses.           Carotid pulses are 2+ on the right side and 2+ on the left side.       Radial pulses are 2+ on the right side and 2+ on the left side.        Dorsalis pedis pulses are 2+ on the right side and 2+ on the left side.        Posterior tibial pulses are 2+ on the right side and 2+ on the left side.      Heart sounds: Normal heart sounds.      Comments:  stemmer's neg at L foot     L calf 3cm diameter > R calf   L thigh about equal diameter to R thigh   Spider telangectasia:       RLE:  None      LLE: " none   Varicosities:           RLE: none      LLE: none   Corona phlebectatica:      RLE:  None        LLE:  None   Cording:         RLE:  None     LLE: None     Pulmonary:      Effort: Pulmonary effort is normal.      Breath sounds: Normal breath sounds.   Abdominal:      General: Abdomen is flat. Bowel sounds are normal.      Palpations: Abdomen is soft.   Musculoskeletal:      Cervical back: Normal range of motion and neck supple.      Right lower leg: No edema.      Left lower le+ Edema present.   Skin:     General: Skin is warm and dry.      Capillary Refill: Capillary refill takes less than 2 seconds.   Neurological:      General: No focal deficit present.      Mental Status: She is alert and oriented to person, place, and time. Mental status is at baseline.   Psychiatric:         Mood and Affect: Mood normal.         Behavior: Behavior normal.       Lab Results   Component Value Date    CHOLSTRLTOT 242 (H) 2021     (H) 2021    HDL 78 2021    TRIGLYCERIDE 91 2021             Lab Results   Component Value Date    SODIUM 142 2020    POTASSIUM 4.2 2020    CHLORIDE 104 2020    CO2 24 2020    GLUCOSE 88 2020    BUN 9 2020    CREATININE 0.65 2020    IFAFRICA >60 2020    IFNOTAFR >60 2020        Lab Results   Component Value Date    WBC 3.7 (L) 2021    RBC 4.04 (L) 2021    HEMOGLOBIN 13.4 2021    HEMATOCRIT 41.8 2021    .5 (H) 2021    MCH 33.2 (H) 2021    MCHC 32.1 (L) 2021    MPV 10.7 2021       VASCULAR IMAGING:   Last EKG: No results found for this or any previous visit.     MILES 3/2021   Bilateral. There is no evidence of major arterial disease demonstrated.     MRI brain 21  There are few abnormal T2 hyperintensities in the subcortical and periventricular white matter. None of the lesions demonstrates contrast enhancement. None of the lesions demonstrates typical  "\"Romero finger\" morphology of demyelinating plaques. There are   no posterior fossa lesions. Therefore, these findings does not fulfill the Rhoades MR criteria for diagnosing multiple sclerosis. The differential diagnosis includes nonspecific foci of gliosis, chronic demyelinating plaques and microvascular ischemic   disease. Please correlate with the clinical and other laboratory evidence for diagnosing multiple sclerosis.    MRI Tspine 4/27/21  1.  Again seen faint area of increased T2 signal involving the spinal cord at the T6-7 level without evidence of abnormal enhancement. This is less prominent than previously seen and likely represents a focus of demyelination.   2.  Multilevel degenerative disc disease without significant central canal or neural foraminal narrowing.    Medical Decision Making:  Today's Assessment / Status / Plan:     1. Leg swelling  US-EXTREMITY VENOUS LOWER UNILAT LEFT   2. Lymphedema, not elsewhere classified  NM-LYMPHOSCINTIGRAPHY   3. Hyperlipidemia, unspecified hyperlipidemia type       Patient Type: Primary Prevention    Etiology of Established CVD if Present:   LLE edema - diffuse     LLE SWELLING  Non-tender, chronic, unilat.   R/o VTE such as may-thurner though unlikely due to lack of pain or CVI findings   Low prob primary vs secondary lymphedema based upon exam and neg stemmer's but will need further eval with scintigraphy eval   - unclear if MS-related but neuro feels unlikely - per literature review majority of MS-related edema would be bilat and related to reduced mobility - which she does not have   - no primary myopathy notable on exam, no skin changes or vasculitis findings on exam   - review of meds shows no ADRs with edema (and generally would be bilat if med-related )   - unlikely cardiogenic or primary pulm HTN  - no foreign travel or hx of parasitic infections   Plan:  - check VR duplex, NM-lymphoscintigraphy eval   - no additional labs at this time  - may require " "further eval for soft-tissue hypertrophy  - no med mgmt recommended     ANTITHROMBOTIC THERAPY:  - not recommended     LIPID MANAGEMENT:   Qualifies for Statin Therapy Based on 2018 ACC/AHA Guidelines: no, Primary Prevention - 40-74yo, LDLc >70, <190 w/o DM  10-yr ASCVD risk score: 0.8%, <5% \"low risk\"  Major ASCVD events: None  High-risk conditions: None   Risk-enhancers: N/A  Currently on Statin: No  Treatment goals: LDL-C <100 (consider non-HDL-C <130, apoB <90)  At goal? No, 4/2021   Plan:   - reinforced ongoing TLC measures as noted   - monitor labs annually per PCP   Meds: consider plant sterols      BLOOD PRESSURE MANAGEMENT:  ACC/AHA (2017) goal <130/80  Home BP at goal: yes  Office BP at goal:  yes   Plan:   - continue healthy diet, activity, weight mgmt   Monitoring:   - routine clinic-based BP measurements at least once annually   Medications: no meds indicated at this time       Glycemic Status: Normal    Smoking:   reports that she has never smoked. She has never used smokeless tobacco.   - continued complete avoidance of all tobacco products     Physical Activity: continue healthy activity to improve CV fitness, see care instructions for additional details     Weight Management and Nutrition: Dietary plan was discussed with patient at this visit including DASH, low sodium and/or as outlined in care instructions     Other:     1) MS - stable  - defer ongoing med mgmt and mgmt to neurology     Instructed to follow-up with PCP for remainder of adult medical needs: yes  We will partner with other providers in the management of established vascular disease and cardiometabolic risk factors.    Studies to Be Obtained: LLE VR duplex, NM lymphoscintigraphy    Labs to Be Obtained: as noted above     Follow up in: 4 weeks    Osmel Peace M.D.  [facility]   [facility]   [phone]    "

## 2021-05-25 ENCOUNTER — HOSPITAL ENCOUNTER (OUTPATIENT)
Dept: RADIOLOGY | Facility: MEDICAL CENTER | Age: 51
End: 2021-05-25
Attending: INTERNAL MEDICINE
Payer: COMMERCIAL

## 2021-05-25 ENCOUNTER — PATIENT MESSAGE (OUTPATIENT)
Dept: MEDICAL GROUP | Facility: PHYSICIAN GROUP | Age: 51
End: 2021-05-25

## 2021-05-25 DIAGNOSIS — G89.29 CHRONIC PAIN OF RIGHT KNEE: ICD-10-CM

## 2021-05-25 DIAGNOSIS — M25.561 CHRONIC PAIN OF RIGHT KNEE: ICD-10-CM

## 2021-05-25 PROCEDURE — 73564 X-RAY EXAM KNEE 4 OR MORE: CPT | Mod: RT

## 2021-05-25 RX ORDER — MONTELUKAST SODIUM 10 MG/1
TABLET ORAL
Qty: 90 TABLET | Refills: 3 | Status: SHIPPED | OUTPATIENT
Start: 2021-05-25 | End: 2022-07-05 | Stop reason: SDUPTHER

## 2021-05-25 NOTE — TELEPHONE ENCOUNTER
----- Message from Ct Montalvo sent at 5/25/2021 12:45 PM PDT -----  Regarding: Prescription Question  Contact: [phone]  Hi Dr. Roque,  [facility] called & said my singular must go through [facility] now. Not sure if there's something you do on your end so they'll process it?   Thanks for your help,   [place]

## 2021-05-25 NOTE — TELEPHONE ENCOUNTER
From: Ct Montalvo  To: Physician Anika Roque  Sent: 5/25/2021 12:45 PM PDT  Subject: Prescription Question    Hi Dr. Roque,  [facility] called & said my singular must go through [facility] now. Not sure if there's something you do on your end so they'll process it?   Thanks for your help,   [place]

## 2021-05-26 DIAGNOSIS — G89.29 CHRONIC PAIN OF RIGHT KNEE: ICD-10-CM

## 2021-05-26 DIAGNOSIS — M25.561 CHRONIC PAIN OF RIGHT KNEE: ICD-10-CM

## 2021-06-02 ENCOUNTER — HOSPITAL ENCOUNTER (OUTPATIENT)
Dept: RADIOLOGY | Facility: MEDICAL CENTER | Age: 51
End: 2021-06-02
Attending: INTERNAL MEDICINE
Payer: COMMERCIAL

## 2021-06-02 ENCOUNTER — PATIENT MESSAGE (OUTPATIENT)
Dept: MEDICAL GROUP | Facility: PHYSICIAN GROUP | Age: 51
End: 2021-06-02

## 2021-06-02 DIAGNOSIS — G89.29 CHRONIC PAIN OF RIGHT KNEE: ICD-10-CM

## 2021-06-02 DIAGNOSIS — M25.561 ACUTE PAIN OF RIGHT KNEE: ICD-10-CM

## 2021-06-02 DIAGNOSIS — M25.561 CHRONIC PAIN OF RIGHT KNEE: ICD-10-CM

## 2021-06-02 PROCEDURE — 73721 MRI JNT OF LWR EXTRE W/O DYE: CPT | Mod: RT

## 2021-06-10 ENCOUNTER — PATIENT MESSAGE (OUTPATIENT)
Dept: MEDICAL GROUP | Facility: PHYSICIAN GROUP | Age: 51
End: 2021-06-10

## 2021-06-10 RX ORDER — ACYCLOVIR 400 MG/1
TABLET ORAL
Qty: 60 TABLET | Refills: 0 | Status: SHIPPED | OUTPATIENT
Start: 2021-06-10 | End: 2021-07-06

## 2021-06-16 ENCOUNTER — APPOINTMENT (OUTPATIENT)
Dept: RADIOLOGY | Facility: MEDICAL CENTER | Age: 51
End: 2021-06-16
Attending: FAMILY MEDICINE
Payer: COMMERCIAL

## 2021-06-18 ENCOUNTER — HOSPITAL ENCOUNTER (OUTPATIENT)
Dept: RADIOLOGY | Facility: MEDICAL CENTER | Age: 51
End: 2021-06-18
Attending: FAMILY MEDICINE
Payer: COMMERCIAL

## 2021-06-18 DIAGNOSIS — I89.0 LYMPHEDEMA, NOT ELSEWHERE CLASSIFIED: ICD-10-CM

## 2021-06-18 PROCEDURE — A9541 TC99M SULFUR COLLOID: HCPCS

## 2021-06-22 ENCOUNTER — APPOINTMENT (OUTPATIENT)
Dept: RADIOLOGY | Facility: MEDICAL CENTER | Age: 51
End: 2021-06-22
Attending: FAMILY MEDICINE
Payer: COMMERCIAL

## 2021-06-30 ENCOUNTER — HOSPITAL ENCOUNTER (OUTPATIENT)
Dept: RADIOLOGY | Facility: MEDICAL CENTER | Age: 51
End: 2021-06-30
Attending: FAMILY MEDICINE
Payer: COMMERCIAL

## 2021-06-30 DIAGNOSIS — M79.89 LEG SWELLING: ICD-10-CM

## 2021-06-30 PROCEDURE — 93971 EXTREMITY STUDY: CPT | Mod: LT

## 2021-07-02 ENCOUNTER — OFFICE VISIT (OUTPATIENT)
Dept: VASCULAR LAB | Facility: MEDICAL CENTER | Age: 51
End: 2021-07-02
Attending: FAMILY MEDICINE
Payer: COMMERCIAL

## 2021-07-02 DIAGNOSIS — E78.5 HYPERLIPIDEMIA, UNSPECIFIED HYPERLIPIDEMIA TYPE: ICD-10-CM

## 2021-07-02 DIAGNOSIS — I89.0 LYMPHEDEMA, NOT ELSEWHERE CLASSIFIED: ICD-10-CM

## 2021-07-02 DIAGNOSIS — M79.89 LEG SWELLING: ICD-10-CM

## 2021-07-02 PROCEDURE — 99214 OFFICE O/P EST MOD 30 MIN: CPT | Performed by: FAMILY MEDICINE

## 2021-07-02 ASSESSMENT — ENCOUNTER SYMPTOMS
VOMITING: 0
ORTHOPNEA: 0
HEMOPTYSIS: 0
CLAUDICATION: 0
TREMORS: 0
FEVER: 0
DIZZINESS: 0
COUGH: 0
DIARRHEA: 0
SENSORY CHANGE: 0
BRUISES/BLEEDS EASILY: 0
CHILLS: 0
FOCAL WEAKNESS: 0
MYALGIAS: 0
SEIZURES: 0
TINGLING: 0
HEADACHES: 0
WEAKNESS: 0
SHORTNESS OF BREATH: 0
WHEEZING: 0
ABDOMINAL PAIN: 0
NAUSEA: 0
PALPITATIONS: 0

## 2021-07-02 NOTE — PROGRESS NOTES
This visit was conducted via [employer] video call using secure and encrypted video conferencing technology due to covid-19 restrictions.   The patient was in a private location in the state of [place].    The patient's identity was confirmed and verbal consent was obtained for this virtual visit.    FOLLOW-UP VASCULAR MEDICINE VISIT  Subjective:   Ct Montalvo is a female  who presents today 07/02/21 for   No chief complaint on file.    initially referred by Anika Roque M.D. for eval and med mgmt of possible lymphedema      HPI:    EDEMA:  Interval hx/concerns: denies changes in current status.  No new sx. Had imaging completed as noted below.   Initial hx of symptoms/pattern:   Acute/chronic? Chronic, 2019  Laterality? Left calf, thigh  Distribution? Lower   Timing? No changes during the day    Improve with elevation? no   Pain?  None    Other assoc sx? No swelling, no other sx   Edema-causing conditions:   Increase hydrostatic pressure (CHF, cor pulm, Na retention, preg, idiopathic, fluid overload, cirrhosis, pulm edema, VTE, CVI): none  Decreased oncotic pressure (renal, hepatic, enteropathic): none  Increased insterstitial oncotic pressure (hypothyroidism): none  Decreased lymphatic drainage (primary vs secondary): none  Increased capillary permeability (idiopathic, trauma, burns, inflammation/sepsis, angioedema/all rxn, T2D): none  Meds possibly contributing to edema:  None   Prior work-up:  MILES normal, no prior VR duplex or lymphoscint eval   Tx attempted to date:    Compression? None    Diuretics? None    Other? PT for knee, not specific lymphedema eval     MS:   Overall stable, reports initial presention was LLE weakness that is now stable.  Reports no injury to leg.  Has mild R knee pain.    No new s/s.  Seeing neurology routinely.  Had prior MRI brain and Tspine as noted below.   Tolerating meds.     Hyperlipidemia:    Noted to have elevated LDL-C,  No sx or meds.   Hx of clinical ASCVD  events: None        Current Outpatient Medications:   •  acyclovir, TAKE 2 TABLETS BY MOUTH TWICE A DAY FOR 5 DAYS  •  montelukast, TAKE 1 TABLET BY MOUTH IN THE EVENING  •  pentosan, TAKE 1 CAPSULE 3 TIMES DAILY BEFORE MEALS  •  mometasone, SPRAY 2 SPRAYS IN EACH NOSTRIL TWICE A DAY.  •  zolmitriptan, SPRAY 1 SPRAY IN NOSE ONCE A DAY AS NEEDED FOR HEADACHE FOR UP TO 1 DOSE  •  amitriptyline, 25 mg, Oral, QHS  •  Aubagio, TAKE 1 TABLET BY MOUTH EVERY DAY  •  vitamin B-12, 1,000 mcg, Oral, DAILY  •  rizatriptan, 1 tab at headache onset; repeat every hour as needed up to #3 tab in 24 hours  •  Nutritional Supplements (JUICE PLUS FIBRE PO), Take  by mouth.  •  Vitamin C, Take  by mouth.  •  phenazopyridine, 200 mg, Oral, TID PRN  •  vitamin D, Take  by mouth every day.  •  cetirizine, 10 mg, Oral, DAILY    Social History     Tobacco Use   • Smoking status: Never Smoker   • Smokeless tobacco: Never Used   Vaping Use   • Vaping Use: Never used   Substance Use Topics   • Alcohol use: Yes     Comment: social   • Drug use: No     Review of Systems   Constitutional: Negative for chills, fever and malaise/fatigue.   Respiratory: Negative for cough, hemoptysis, shortness of breath and wheezing.    Cardiovascular: Positive for leg swelling (LEFT only ). Negative for chest pain, palpitations, orthopnea and claudication.   Gastrointestinal: Negative for abdominal pain, diarrhea, nausea and vomiting.   Musculoskeletal: Positive for joint pain (R knee, medial). Negative for myalgias.   Skin: Negative for itching and rash.   Neurological: Negative for dizziness, tingling, tremors, sensory change, focal weakness, seizures, weakness and headaches.   Endo/Heme/Allergies: Does not bruise/bleed easily.      Objective:     There were no vitals filed for this visit.   BP Readings from Last 5 Encounters:   05/20/21 101/68   03/25/21 110/70   03/10/21 124/70   10/27/20 102/60   07/22/19 104/66      There is no height or weight on file to  "calculate BMI.  Physical Exam  Constitutional:       Appearance: Normal appearance.   HENT:      Head: Normocephalic.   Eyes:      Extraocular Movements: Extraocular movements intact.      Conjunctiva/sclera: Conjunctivae normal.   Pulmonary:      Effort: Pulmonary effort is normal.   Musculoskeletal:      Cervical back: Normal range of motion.   Skin:     General: Skin is dry.      Coloration: Skin is not pale.      Findings: No rash.   Neurological:      General: No focal deficit present.      Mental Status: She is alert and oriented to person, place, and time.   Psychiatric:         Mood and Affect: Mood normal.         Behavior: Behavior normal.       Lab Results   Component Value Date    CHOLSTRLTOT 242 (H) 01/20/2021     (H) 01/20/2021    HDL 78 01/20/2021    TRIGLYCERIDE 91 01/20/2021             Lab Results   Component Value Date    SODIUM 142 06/17/2020    POTASSIUM 4.2 06/17/2020    CHLORIDE 104 06/17/2020    CO2 24 06/17/2020    GLUCOSE 88 06/17/2020    BUN 9 06/17/2020    CREATININE 0.65 06/17/2020    IFAFRICA >60 06/17/2020    IFNOTAFR >60 06/17/2020        Lab Results   Component Value Date    WBC 3.7 (L) 01/20/2021    RBC 4.04 (L) 01/20/2021    HEMOGLOBIN 13.4 01/20/2021    HEMATOCRIT 41.8 01/20/2021    .5 (H) 01/20/2021    MCH 33.2 (H) 01/20/2021    MCHC 32.1 (L) 01/20/2021    MPV 10.7 01/20/2021       VASCULAR IMAGING:   Last EKG: No results found for this or any previous visit.     MILES 3/2021   Bilateral. There is no evidence of major arterial disease demonstrated.     MRI brain 4/27/21  There are few abnormal T2 hyperintensities in the subcortical and periventricular white matter. None of the lesions demonstrates contrast enhancement. None of the lesions demonstrates typical \"Romero finger\" morphology of demyelinating plaques. There are   no posterior fossa lesions. Therefore, these findings does not fulfill the Rhoades MR criteria for diagnosing multiple sclerosis. The differential " diagnosis includes nonspecific foci of gliosis, chronic demyelinating plaques and microvascular ischemic   disease. Please correlate with the clinical and other laboratory evidence for diagnosing multiple sclerosis.    MRI Tspine 4/27/21  1.  Again seen faint area of increased T2 signal involving the spinal cord at the T6-7 level without evidence of abnormal enhancement. This is less prominent than previously seen and likely represents a focus of demyelination.   2.  Multilevel degenerative disc disease without significant central canal or neural foraminal narrowing.    VR duplex 6/30/21    No LEFT leg DVT.   No apparent compression of the LEFT iliac vein to indicate May Thurner    syndrome.   No venous reflux demonstrated.    NM lymphoscintingraphy 6/18/21  A single lymphatic trunk is visualized in the left lower extremity without dermal backflow. Lymphatic trunk is not visualized on the right, but activity is seen in the right inguinal lymph node on 60 minute delayed images.    Knee xrays 6/30/21  X-ray weightbearing view of the right knee shows well-preserved joint   space.  Patient has subtle spur in the notch of the medial femoral   condyle.    Medical Decision Making:  Today's Assessment / Status / Plan:     1. Leg swelling     2. Lymphedema, not elsewhere classified     3. Hyperlipidemia, unspecified hyperlipidemia type       Patient Type: Primary Prevention    Etiology of Established CVD if Present:   LLE edema - diffuse     LLE SWELLING  Non-tender, chronic, unilat.   Low prob lymphedema as evidenced by normal NM lymphoscint and neg stemmer's sign   - unclear if MS-related but neuro feels unlikely - per literature review majority of MS-related edema would be bilat and related to reduced mobility - unclear if soft-tissue hypertrophy vs RLE atropyy  - no primary myopathy notable on exam, no skin changes or vasculitis findings on exam   - review of meds shows no ADRs with edema (and generally would be bilat if  "med-related )   - unlikely cardiogenic or primary pulm HTN  - no foreign travel or hx of parasitic infections   - as shared with patient no indications of arterial, venous, or lymphatic causes to her chronic leg swelling, so should pursue other non-vasc etiologies that could include musculoskel, other neuro-related issues  - have reviewed standard DDx of edema with patient and no other indications of common etiologies found which is reassuring however does not provide a definitive dx for patient   Plan:  - f/u with PCP and neurology for further assessment   - continue leg elevation to determine if helpful   - no med mgmt recommended     ANTITHROMBOTIC THERAPY:  - not recommended     LIPID MANAGEMENT:   Qualifies for Statin Therapy Based on 2018 ACC/AHA Guidelines: no, Primary Prevention - 40-74yo, LDLc >70, <190 w/o DM  10-yr ASCVD risk score: 0.8%, <5% \"low risk\"  Major ASCVD events: None  High-risk conditions: None   Risk-enhancers: N/A  Currently on Statin: No  Treatment goals: LDL-C <100 (consider non-HDL-C <130, apoB <90)  At goal? No, 4/2021   Plan:   - reinforced ongoing TLC measures as noted   - monitor labs annually per PCP   Meds: consider plant sterols      BLOOD PRESSURE MANAGEMENT:  ACC/AHA (2017) goal <130/80  Home BP at goal: yes  Office BP at goal:  yes   Plan:   - continue healthy diet, activity, weight mgmt   Monitoring:   - routine clinic-based BP measurements at least once annually   Medications: no meds indicated at this time       Glycemic Status: Normal    Smoking:   reports that she has never smoked. She has never used smokeless tobacco.   - continued complete avoidance of all tobacco products     Physical Activity: continue healthy activity to improve CV fitness, see care instructions for additional details     Weight Management and Nutrition: Dietary plan was discussed with patient at this visit including DASH, low sodium and/or as outlined in care instructions     Other:     1) MS - " stable  - defer ongoing med mgmt and mgmt to neurology     Instructed to follow-up with PCP for remainder of adult medical needs: yes  We will partner with other providers in the management of established vascular disease and cardiometabolic risk factors.    Studies to Be Obtained: none   Labs to Be Obtained: as noted above     Follow up in: prn     Time: 30-39min - chart review/prep, review of other providers' records, imaging/lab review, face-to-face time for history/examination, ordering, prescribing,  review of results/meds/ treatment plan with patient/family/caregiver, documentation in EMR, care coordination (as needed)    Osmel Peace M.D.  [facility]   [facility]   [phone]

## 2021-07-06 DIAGNOSIS — M62.561 MUSCLE WASTING AND ATROPHY, NOT ELSEWHERE CLASSIFIED, RIGHT LOWER LEG: ICD-10-CM

## 2021-07-06 RX ORDER — ACYCLOVIR 400 MG/1
TABLET ORAL
Qty: 60 TABLET | Refills: 1 | Status: SHIPPED | OUTPATIENT
Start: 2021-07-06 | End: 2021-08-30

## 2021-07-17 RX ORDER — ACYCLOVIR 400 MG/1
TABLET ORAL
Qty: 360 TABLET | Refills: 1 | OUTPATIENT
Start: 2021-07-17

## 2021-07-21 ENCOUNTER — NON-PROVIDER VISIT (OUTPATIENT)
Dept: NEUROLOGY | Facility: MEDICAL CENTER | Age: 51
End: 2021-07-21
Attending: PSYCHIATRY & NEUROLOGY
Payer: COMMERCIAL

## 2021-07-21 DIAGNOSIS — M62.561 MUSCLE WASTING AND ATROPHY, NOT ELSEWHERE CLASSIFIED, RIGHT LOWER LEG: ICD-10-CM

## 2021-07-21 PROCEDURE — 95913 NRV CNDJ TEST 13/> STUDIES: CPT | Performed by: PSYCHIATRY & NEUROLOGY

## 2021-07-21 PROCEDURE — 95887 MUSC TST DONE W/N TST NONEXT: CPT | Performed by: PSYCHIATRY & NEUROLOGY

## 2021-07-21 PROCEDURE — 95886 MUSC TEST DONE W/N TEST COMP: CPT | Mod: 26,LT | Performed by: PSYCHIATRY & NEUROLOGY

## 2021-07-21 PROCEDURE — 95886 MUSC TEST DONE W/N TEST COMP: CPT | Performed by: PSYCHIATRY & NEUROLOGY

## 2021-07-21 PROCEDURE — 95887 MUSC TST DONE W/N TST NONEXT: CPT | Mod: 26 | Performed by: PSYCHIATRY & NEUROLOGY

## 2021-07-21 PROCEDURE — 95885 MUSC TST DONE W/NERV TST LIM: CPT | Mod: 26,59,RT | Performed by: PSYCHIATRY & NEUROLOGY

## 2021-07-21 PROCEDURE — 95913 NRV CNDJ TEST 13/> STUDIES: CPT | Mod: 26 | Performed by: PSYCHIATRY & NEUROLOGY

## 2021-07-21 PROCEDURE — 95885 MUSC TST DONE W/NERV TST LIM: CPT | Performed by: PSYCHIATRY & NEUROLOGY

## 2021-07-21 NOTE — PROCEDURES
"NERVE CONDUCTION STUDIES AND ELECTROMYOGRAPHY REPORT  [facility]  07/21/21          IMPRESSION:  This is a normal electrodiagnostic study.  There is no evidence of a large fiber peripheral neuropathy in the legs or left lumbosacral radiculopathy.  There is no evidence of ongoing denervation reinnervation to suggest motor neuron disease.  Bilateral femoral motor responses are within normal limits as well.  Recommend clinical correlation.      Radha Mcknight MD  Neurology - Neurophysiology        REASON FOR REFERRAL:  Ms. Ct Montalvo 50 y.o. referred by Dr. Jean Marie Shelley for evaluation of muscle atrophy.  Patient has a history of multiple sclerosis but noticed incidentally that her left quadriceps is smaller than her right.  This is of uncertain duration.  Evaluate for motor neuron disease/peripheral neuropathy.    Height: 5'4\"  Weight: 142 lbs    Symptom focused neurological exam shows normal strength and sensation in the left upper and lower extremity.      ELECTRODIAGNOSTIC EXAMINATION:  Nerve conduction studies (NCS) and electromyography (EMG) are utilized to evaluate direct or indirect damage to the peripheral nervous system. NCS are performed to measure the nerve(s) response(s) to electrostimulation across a given nerve segment. EMG evaluates the passive and active electrical activity of the muscle(s) in question.  Muscles are innervated by specific peripheral nerves and roots. Often times, several nerves the muscle to be examined in order to determine the presence or absence of the disease process. Furthermore, nerves and muscles may need to be tested in a [id number] comparison, as well as in additional extremities, as this may be crucial in characterizing the extent of the disease process, which may be diffuse or isolated and of varying degree of severity. The extent of the neurodiagnostic exam is justified as it may help arrive to a proper diagnosis, which ultimately may " contribute to better management of the patient. Therefore, the nerves to muscles examined during the study were medically necessary.    Unless otherwise noted, temperature of the extremity(s) study was monitored before and during the examination and remained between 32 and 36 degrees C for the upper extremities, and between 30 and 36 degrees C for the lower extremities. The patient tolerated testing well, without any complications.       NERVE CONDUCTION STUDY SUMMARY:  Selected nerves of the bilateral lower extremity and left upper extremity are studied.    Normal left median and ulnar sensory and motor responses.  Normal bilateral sural sensory responses.  Normal and symmetric bilateral lateral femoral cutaneous sensory responses.  Normal bilateral femoral motor responses at the vastus medialis.  Normal bilateral common peroneal motor responses at the extensor digitorum brevis.  Normal bilateral tibial motor responses at the abductor hallucis brevis.  Bilateral common peroneal/tibial and left ulnar and median F waves are normal.    NEEDLE EMG SUMMARY:  Concentric needle study of selected bilateral lower extremity, left upper extremity, thoracic and lumbar paraspinal muscles is performed.     Insertion activity is normal in all muscles sampled.   With activation, there are normal morphology (amplitude/duration) motor unit action potentials firing with normal recruitment in muscles tested.       PATIENT DATA TABLES  Nerve Conduction Studies     Stim Site NR Onset (ms) Norm Onset (ms) O-P Amp (µV) Norm O-P Amp Site1 Site2 Delta-P (ms) Dist (cm) Lonnie (m/s) Norm Lonnie (m/s)   Left Lat Femoral Cutan Anti Sensory (Lateral Thigh)   ASIS    3.5  11.5  ASIS Lateral Thigh 4.0 0.0     Right Lat Femoral Cutan Anti Sensory (Lateral Thigh)   ASIS    3.6  8.3  ASIS Lateral Thigh 4.2 0.0     Left Median Anti Sensory (2nd Digit)   Wrist    2.0 <3.6 36.3 >10 Wrist 2nd Digit 3.3 14.0 *42 >50   Left Sural Anti Sensory (Lat Mall)   Calf     2.6 <4.6 5.3 >4 Calf Lat Mall 3.2 14.0 44 >40   Right Sural Anti Sensory (Lat Mall)   Calf    3.0 <4.6 7.4 >4 Calf Lat Mall 4.0 14.0 *35 >40   Left Ulnar Anti Sensory (5th Digit)   Wrist    2.2 <3.1 43.0 >10 Wrist 5th Digit 2.8 14.0 50 >50        Stim Site NR Onset (ms) Norm Onset (ms) O-P Amp (mV) Norm O-P Amp Site1 Site2 Delta-0 (ms) Dist (cm) Lonnie (m/s) Norm Lonnie (m/s)   Left Femoral Motor (Vastus Med)   Abv Ing Lig    2.7 <6.5 3.4 >3         Right Femoral Motor (Vastus Med)   Abv Ing Lig    3.3 <6.5 4.1 >3         Left Median Motor (Abd Poll Brev)   Wrist    3.2 <4 13.0 >6 Elbow Wrist 4.6 25.0 54 >50   Elbow    7.8  10.6          Left Peroneal EDB Motor (Ext Dig Brev)   Ankle    4.0 <6 4.6 >2.5 B Fib Ankle 6.6 32.0 48 >40   B Fib    10.6  3.9  Poplt B Fib 1.4 10.0 71    Poplt    12.0  3.2          Right Peroneal EDB Motor (Ext Dig Brev)   Ankle    4.1 <6 5.7 >2.5 B Fib Ankle 6.4 33.0 52 >40   B Fib    10.5  5.4  Poplt B Fib 1.1 10.0 91    Poplt    11.6  5.3          Left Tibial Motor (Abd Mcintosh Brev)   Ankle    4.4 <6 8.6 >4 Knee Ankle 7.5 40.0 53 >40   Knee    11.9  6.2          Right Tibial Motor (Abd Cmintosh Brev)   Ankle    3.1 <6 9.1 >4 Knee Ankle 7.4 39.0 53 >40   Knee    10.5  8.0          Left Ulnar Motor (Abd Dig Min)   Wrist    2.8 <3.1 10.6 >7 B Elbow Wrist 2.9 16.0 55 >50   B Elbow    5.7  10.1  A Elbow B Elbow 1.2 10.0 83    A Elbow    6.9  10.0            F Wave Studies     NR F-Lat (ms) Lat Norm (ms)   Left Median (Abd Poll Brev)      24.32 <31   Left Peroneal EDB (Ext Dig Brev)      42.88 <57   Right Peroneal EDB (Ext Dig Brev)      46.20 <57   Left Tibial (Abd Hallucis)      43.12 <57   Right Tibial (Abd Hallucis)      43.39 <57   Left Ulnar (Abd Dig Min)      23.20 <32                                                     Electromyography     Side Muscle Nerve Root Ins Act Fibs Psw Amp Dur Poly Recrt Int Pat Comment   Left AntTibialis Dp Br Fibular L4-5 Nml Nml Nml Nml Nml 0 Nml Nml    Left Gastroc  Tibial S1-2 Nml Nml Nml Nml Nml 0 Nml Nml    Left VastusLat Femoral L2-4 Nml Nml Nml Nml Nml 0 Nml Nml    Left GluteusMed SupGluteal L5-S1 Nml Nml Nml Nml Nml 0 Nml Nml    Left Lumbo Parasp Low Rami L5-S1 Nml Nml Nml         Right VastusLat Femoral L2-4 Nml Nml Nml Nml Nml 0 Nml Nml    Left 1stDorInt Ulnar C8-T1 Nml Nml Nml Nml Nml 0 Nml Nml    Left PronatorTeres Median C6-7 Nml Nml Nml Nml Nml 0 Nml Nml    Left Biceps Musculocut C5-6 Nml Nml Nml Nml Nml 0 Nml Nml    Left Triceps Radial C6-7-8 Nml Nml Nml Nml Nml 0 Nml Nml    Left Deltoid Axillary C5-6 Nml Nml Nml Nml Nml 0 Nml Nml    Left T6 Parasp Rami T6 Nml Nml Nml         Left T8 Parasp Rami T8 Nml Nml Nml

## 2021-07-22 DIAGNOSIS — G35 MULTIPLE SCLEROSIS (HCC): Chronic | ICD-10-CM

## 2021-07-22 RX ORDER — MODAFINIL 200 MG/1
TABLET ORAL
Qty: 90 TABLET | Refills: 1 | Status: SHIPPED | OUTPATIENT
Start: 2021-07-22 | End: 2022-01-27

## 2021-08-09 DIAGNOSIS — G43.009 MIGRAINE WITHOUT AURA AND WITHOUT STATUS MIGRAINOSUS, NOT INTRACTABLE: ICD-10-CM

## 2021-08-09 RX ORDER — ZOLMITRIPTAN 5 MG/1
SPRAY NASAL
Qty: 6 EACH | Refills: 4 | Status: SHIPPED | OUTPATIENT
Start: 2021-08-09 | End: 2022-01-07

## 2021-08-09 NOTE — TELEPHONE ENCOUNTER
Received request via: Pharmacy    Was the patient seen in the last year in this department? Yes    Does the patient have an active prescription (recently filled or refills available) for medication(s) requested? No     Patient last seen on 03/10/2021.

## 2021-08-30 RX ORDER — ACYCLOVIR 400 MG/1
TABLET ORAL
Qty: 60 TABLET | Refills: 1 | Status: SHIPPED | OUTPATIENT
Start: 2021-08-30 | End: 2021-09-09

## 2021-09-04 DIAGNOSIS — G43.009 MIGRAINE WITHOUT AURA AND WITHOUT STATUS MIGRAINOSUS, NOT INTRACTABLE: ICD-10-CM

## 2021-09-07 RX ORDER — RIZATRIPTAN BENZOATE 10 MG/1
TABLET ORAL
Qty: 10 TABLET | Refills: 11 | Status: SHIPPED | OUTPATIENT
Start: 2021-09-07 | End: 2022-09-30

## 2021-09-09 RX ORDER — ACYCLOVIR 400 MG/1
TABLET ORAL
Qty: 60 TABLET | Refills: 1 | Status: SHIPPED | OUTPATIENT
Start: 2021-09-09 | End: 2021-10-06

## 2021-10-06 RX ORDER — ACYCLOVIR 400 MG/1
TABLET ORAL
Qty: 60 TABLET | Refills: 1 | Status: SHIPPED | OUTPATIENT
Start: 2021-10-06 | End: 2021-12-07

## 2021-10-06 NOTE — TELEPHONE ENCOUNTER
Received request via: Pharmacy    Was the patient seen in the last year in this department? Yes    Does the patient have an active prescription (recently filled or refills available) for medication(s) requested? Yes, continuation of medicinal therapy

## 2021-10-18 ENCOUNTER — APPOINTMENT (OUTPATIENT)
Dept: NEUROLOGY | Facility: MEDICAL CENTER | Age: 51
End: 2021-10-18
Attending: PSYCHIATRY & NEUROLOGY
Payer: COMMERCIAL

## 2021-10-25 ENCOUNTER — OFFICE VISIT (OUTPATIENT)
Dept: NEUROLOGY | Facility: MEDICAL CENTER | Age: 51
End: 2021-10-25
Attending: PSYCHIATRY & NEUROLOGY
Payer: COMMERCIAL

## 2021-10-25 VITALS
RESPIRATION RATE: 14 BRPM | WEIGHT: 136.69 LBS | DIASTOLIC BLOOD PRESSURE: 80 MMHG | HEART RATE: 102 BPM | SYSTOLIC BLOOD PRESSURE: 118 MMHG | BODY MASS INDEX: 23.34 KG/M2 | HEIGHT: 64 IN | TEMPERATURE: 98.4 F | OXYGEN SATURATION: 98 %

## 2021-10-25 DIAGNOSIS — G35 MS (MULTIPLE SCLEROSIS) (HCC): Primary | ICD-10-CM

## 2021-10-25 DIAGNOSIS — G43.009 MIGRAINE WITHOUT AURA AND WITHOUT STATUS MIGRAINOSUS, NOT INTRACTABLE: ICD-10-CM

## 2021-10-25 PROCEDURE — 99212 OFFICE O/P EST SF 10 MIN: CPT | Performed by: PSYCHIATRY & NEUROLOGY

## 2021-10-25 PROCEDURE — 99214 OFFICE O/P EST MOD 30 MIN: CPT | Performed by: PSYCHIATRY & NEUROLOGY

## 2021-10-25 RX ORDER — RENAGEL 800 MG/1
1 TABLET ORAL
Qty: 90 TABLET | Refills: 3 | Status: SHIPPED | OUTPATIENT
Start: 2021-10-25 | End: 2022-01-27 | Stop reason: SDUPTHER

## 2021-10-25 ASSESSMENT — ENCOUNTER SYMPTOMS
DOUBLE VISION: 0
TINGLING: 1
FOCAL WEAKNESS: 1
MEMORY LOSS: 0
PHOTOPHOBIA: 0

## 2021-10-25 ASSESSMENT — FIBROSIS 4 INDEX: FIB4 SCORE: 0.81

## 2021-10-26 NOTE — PROGRESS NOTES
Subjective     Ct Montalvo is a 51 y.o. female who presents for follow-up, with a history of MS and migraine without aura.    HPI    MS: Ct denies any events suggestive of disease relapse.  The left leg still can become problematic, a little heavier at times, with some tingling.  There is no loss of sensation.  The symptoms are brief, limited with full recovery.  This occurs randomly.  The asymmetry in the lower extremity size has been evaluated rather thoroughly.  I could not identify a neurologic cause for the relatively smaller size of the right leg.  She underwent a rather thorough vascular work-up, this to proved unremarkable.    MRI scan of the brain and thoracic spine have been stable, performed on an annual basis over the last 5 years.  A very subtle area of myelopathy at the T6-7 level has been unchanged, no evidence of new enhancement.  Lesions in the brain are stable as well, though in a nonspecific pattern.  Her last CBC from January, 2021, revealed a macrocytic indices though H/H remained normal.  WBC is a little low at 3.7, platelets 296.  CMP from June, 2020, was completely normal.    She is on Aubagio 14 mg daily and Provigil 200 mg daily.  She will use Pyridium 300 mg 3 times daily as needed.    Migraine: The headaches still occur on a monthly basis, lasting anywhere from 3-4 days.  Zomig and Maxalt are used individually, single dose in the day is enough, the headaches simply recurring on subsequent days.    Medical, surgical and family histories are reviewed, there are no new drug allergies.  She is on Aubagio and Provigil as above, Zomig 5 mg and Maxalt 10 mg as needed, Elavil 25 mg nightly, vitamin D with calcium, Zovirax, Zyrtec, the rest as per the electronic health record, noncontributory from my standpoint.    Review of Systems   Constitutional: Positive for malaise/fatigue.   Eyes: Negative for double vision and photophobia.   Neurological: Positive for tingling and focal  "weakness.   Psychiatric/Behavioral: Negative for memory loss.   All other systems reviewed and are negative.    Objective     /80 (BP Location: Right arm, Patient Position: Sitting, BP Cuff Size: Adult)   Pulse (!) 102   Temp 36.9 °C (98.4 °F) (Temporal)   Resp 14   Ht 1.626 m (5' 4\")   Wt 62 kg (136 lb 11 oz)   SpO2 98%   BMI 23.46 kg/m²      Physical Exam    She appears in no acute distress.  Vital signs are stable.  There is no malar rash.  The neck is supple, range of motion is full, Lhermitte's phenomena is absent.  Compression maneuvers are negative.  Cardiac evaluation reveals a regular rhythm.  Straight leg raising is negative bilaterally.  Distal pulses are intact bilaterally.     Neurological Exam    Fully oriented, there is no aphasia, apraxia, or inattention.    PERRLA/EOMI, visual fields are full on confrontation with finger counting.  Facial movements are symmetric, there is no sensory loss to temperature light touch bilaterally.  The tongue and uvula are midline without bulbar dysfunction.  Jaw jerk is absent.  Shoulder shrug and head rotation are symmetric.    Musculoskeletal exam reveals normal tone without tremor, asterixis, or drift.  Strength is 5/5 throughout.  Reflexes are brisk and symmetric in the upper extremities, there is a left sided prominence in the legs.  Both toes are downgoing.    She stands easily, armswing is symmetric, gait is normal and station and stride length.  Heel, toe, and tandem walking are all intact.  Repetitive movements are intact in all 4 extremities, amplitude and frequencies maintained and symmetric.  There is no appendicular dystaxia throughout.    Sensory exam is intact to vibration and temperature throughout.  Romberg is absent.    Assessment & Plan     1. MS (multiple sclerosis) (HCC)  Clinically and radiographically stable, we will repeat another MRI of the brain and thoracic spine in April of next year, on an annual basis.  Auclaudinegio will be " continued.  CBC and CMP do need to be checked on an annual basis.  We will follow-up afterwards, and then on an annual basis as before.    - Teriflunomide (AUBAGIO) 14 MG Tab; Take 1 Tablet by mouth every day.  Dispense: 90 Tablet; Refill: 3  - MR-BRAIN-WITH & W/O; Future  - MR-THORACIC SPINE-WITH & W/O; Future    2. Migraine without aura and without status migrainosus, not intractable  Stable as well, Zomig and Maxalt can be used safely and interchangeably.  She knows not to take them both at the same time.    Time: 30 minutes was spent in total for patient care including precharting, record review, discussions with healthcare staff and documentation.  This included face-to-face time with the patient for exam, review, education, counseling and coordinating care.

## 2021-12-07 RX ORDER — ACYCLOVIR 400 MG/1
TABLET ORAL
Qty: 60 TABLET | Refills: 1 | Status: SHIPPED | OUTPATIENT
Start: 2021-12-07 | End: 2022-02-09

## 2022-01-07 DIAGNOSIS — G43.009 MIGRAINE WITHOUT AURA AND WITHOUT STATUS MIGRAINOSUS, NOT INTRACTABLE: ICD-10-CM

## 2022-01-07 RX ORDER — ZOLMITRIPTAN 5 MG/1
SPRAY NASAL
Qty: 6 EACH | Refills: 4 | Status: SHIPPED | OUTPATIENT
Start: 2022-01-07 | End: 2022-05-31 | Stop reason: SDUPTHER

## 2022-01-27 DIAGNOSIS — G35 MULTIPLE SCLEROSIS (HCC): Chronic | ICD-10-CM

## 2022-01-27 DIAGNOSIS — G35 MS (MULTIPLE SCLEROSIS) (HCC): ICD-10-CM

## 2022-01-27 RX ORDER — RENAGEL 800 MG/1
1 TABLET ORAL
Qty: 90 TABLET | Refills: 3 | Status: SHIPPED | OUTPATIENT
Start: 2022-01-27 | End: 2023-01-05

## 2022-01-27 RX ORDER — MODAFINIL 200 MG/1
TABLET ORAL
Qty: 90 TABLET | Refills: 2 | Status: SHIPPED | OUTPATIENT
Start: 2022-01-27 | End: 2022-08-02

## 2022-01-27 NOTE — TELEPHONE ENCOUNTER
Received request via: Pharmacy    Was the patient seen in the last year in this department? Yes    Does the patient have an active prescription (recently filled or refills available) for medication(s) requested? Yes. R

## 2022-01-27 NOTE — TELEPHONE ENCOUNTER
Received request via: Pharmacy    Was the patient seen in the last year in this department? Yes    Does the patient have an active prescription (recently filled or refills available) for medication(s) requested? Yes.   Karsten alonso    Hi,   When I called to refill my aubagio  for this month, [facility] did let me know that I am out of refills. Yet, my prior authorization does not  until , so I’m thinking that I just need a renewed refill so [facility] refill it in March.   I think. Please advise.

## 2022-01-27 NOTE — TELEPHONE ENCOUNTER
my modafinil has . [facility] should be contacting you to renew the prescription. I only have three tablets left. Thank you for your help so I will not be without the meds. Sorry for the late notice; usually the pharmacy takes care of this ahead of time as this medication is on an automatic refill.

## 2022-02-03 ENCOUNTER — HOSPITAL ENCOUNTER (OUTPATIENT)
Dept: LAB | Facility: MEDICAL CENTER | Age: 52
End: 2022-02-03
Attending: INTERNAL MEDICINE
Payer: COMMERCIAL

## 2022-02-03 ENCOUNTER — HOSPITAL ENCOUNTER (OUTPATIENT)
Dept: LAB | Facility: MEDICAL CENTER | Age: 52
End: 2022-02-03
Attending: PSYCHIATRY & NEUROLOGY
Payer: COMMERCIAL

## 2022-02-03 DIAGNOSIS — G35 MULTIPLE SCLEROSIS (HCC): Chronic | ICD-10-CM

## 2022-02-03 LAB
25(OH)D3 SERPL-MCNC: 68 NG/ML (ref 30–100)
ALBUMIN SERPL BCP-MCNC: 4.8 G/DL (ref 3.2–4.9)
ALBUMIN SERPL BCP-MCNC: 4.8 G/DL (ref 3.2–4.9)
ALBUMIN/GLOB SERPL: 2.3 G/DL
ALBUMIN/GLOB SERPL: 2.3 G/DL
ALP SERPL-CCNC: 78 U/L (ref 30–99)
ALP SERPL-CCNC: 79 U/L (ref 30–99)
ALT SERPL-CCNC: 22 U/L (ref 2–50)
ALT SERPL-CCNC: 23 U/L (ref 2–50)
ANION GAP SERPL CALC-SCNC: 9 MMOL/L (ref 7–16)
ANION GAP SERPL CALC-SCNC: 9 MMOL/L (ref 7–16)
AST SERPL-CCNC: 20 U/L (ref 12–45)
AST SERPL-CCNC: 24 U/L (ref 12–45)
BASOPHILS # BLD AUTO: 1.4 % (ref 0–1.8)
BASOPHILS # BLD AUTO: 1.7 % (ref 0–1.8)
BASOPHILS # BLD: 0.05 K/UL (ref 0–0.12)
BASOPHILS # BLD: 0.06 K/UL (ref 0–0.12)
BILIRUB SERPL-MCNC: 0.3 MG/DL (ref 0.1–1.5)
BILIRUB SERPL-MCNC: 0.3 MG/DL (ref 0.1–1.5)
BUN SERPL-MCNC: 15 MG/DL (ref 8–22)
BUN SERPL-MCNC: 16 MG/DL (ref 8–22)
CALCIUM SERPL-MCNC: 9.5 MG/DL (ref 8.5–10.5)
CALCIUM SERPL-MCNC: 9.6 MG/DL (ref 8.5–10.5)
CHLORIDE SERPL-SCNC: 106 MMOL/L (ref 96–112)
CHLORIDE SERPL-SCNC: 107 MMOL/L (ref 96–112)
CHOLEST SERPL-MCNC: 212 MG/DL (ref 100–199)
CO2 SERPL-SCNC: 26 MMOL/L (ref 20–33)
CO2 SERPL-SCNC: 26 MMOL/L (ref 20–33)
CREAT SERPL-MCNC: 0.58 MG/DL (ref 0.5–1.4)
CREAT SERPL-MCNC: 0.59 MG/DL (ref 0.5–1.4)
EOSINOPHIL # BLD AUTO: 0.16 K/UL (ref 0–0.51)
EOSINOPHIL # BLD AUTO: 0.17 K/UL (ref 0–0.51)
EOSINOPHIL NFR BLD: 4.5 % (ref 0–6.9)
EOSINOPHIL NFR BLD: 4.8 % (ref 0–6.9)
ERYTHROCYTE [DISTWIDTH] IN BLOOD BY AUTOMATED COUNT: 46.6 FL (ref 35.9–50)
ERYTHROCYTE [DISTWIDTH] IN BLOOD BY AUTOMATED COUNT: 47.1 FL (ref 35.9–50)
FOLATE SERPL-MCNC: 22.6 NG/ML
GLOBULIN SER CALC-MCNC: 2.1 G/DL (ref 1.9–3.5)
GLOBULIN SER CALC-MCNC: 2.1 G/DL (ref 1.9–3.5)
GLUCOSE SERPL-MCNC: 92 MG/DL (ref 65–99)
GLUCOSE SERPL-MCNC: 92 MG/DL (ref 65–99)
HCT VFR BLD AUTO: 39.9 % (ref 37–47)
HCT VFR BLD AUTO: 39.9 % (ref 37–47)
HDLC SERPL-MCNC: 84 MG/DL
HGB BLD-MCNC: 13 G/DL (ref 12–16)
HGB BLD-MCNC: 13.1 G/DL (ref 12–16)
IMM GRANULOCYTES # BLD AUTO: 0 K/UL (ref 0–0.11)
IMM GRANULOCYTES # BLD AUTO: 0 K/UL (ref 0–0.11)
IMM GRANULOCYTES NFR BLD AUTO: 0 % (ref 0–0.9)
IMM GRANULOCYTES NFR BLD AUTO: 0 % (ref 0–0.9)
LDLC SERPL CALC-MCNC: 114 MG/DL
LYMPHOCYTES # BLD AUTO: 1.86 K/UL (ref 1–4.8)
LYMPHOCYTES # BLD AUTO: 1.92 K/UL (ref 1–4.8)
LYMPHOCYTES NFR BLD: 52.5 % (ref 22–41)
LYMPHOCYTES NFR BLD: 53.5 % (ref 22–41)
MCH RBC QN AUTO: 32.9 PG (ref 27–33)
MCH RBC QN AUTO: 33.1 PG (ref 27–33)
MCHC RBC AUTO-ENTMCNC: 32.6 G/DL (ref 33.6–35)
MCHC RBC AUTO-ENTMCNC: 32.8 G/DL (ref 33.6–35)
MCV RBC AUTO: 100.8 FL (ref 81.4–97.8)
MCV RBC AUTO: 101 FL (ref 81.4–97.8)
MONOCYTES # BLD AUTO: 0.44 K/UL (ref 0–0.85)
MONOCYTES # BLD AUTO: 0.45 K/UL (ref 0–0.85)
MONOCYTES NFR BLD AUTO: 12.3 % (ref 0–13.4)
MONOCYTES NFR BLD AUTO: 12.7 % (ref 0–13.4)
NEUTROPHILS # BLD AUTO: 1.01 K/UL (ref 2–7.15)
NEUTROPHILS # BLD AUTO: 1.01 K/UL (ref 2–7.15)
NEUTROPHILS NFR BLD: 28 % (ref 44–72)
NEUTROPHILS NFR BLD: 28.6 % (ref 44–72)
NRBC # BLD AUTO: 0 K/UL
NRBC # BLD AUTO: 0 K/UL
NRBC BLD-RTO: 0 /100 WBC
NRBC BLD-RTO: 0 /100 WBC
PLATELET # BLD AUTO: 314 K/UL (ref 164–446)
PLATELET # BLD AUTO: 322 K/UL (ref 164–446)
PMV BLD AUTO: 10.2 FL (ref 9–12.9)
PMV BLD AUTO: 10.3 FL (ref 9–12.9)
POTASSIUM SERPL-SCNC: 4.2 MMOL/L (ref 3.6–5.5)
POTASSIUM SERPL-SCNC: 4.2 MMOL/L (ref 3.6–5.5)
PROT SERPL-MCNC: 6.9 G/DL (ref 6–8.2)
PROT SERPL-MCNC: 6.9 G/DL (ref 6–8.2)
RBC # BLD AUTO: 3.95 M/UL (ref 4.2–5.4)
RBC # BLD AUTO: 3.96 M/UL (ref 4.2–5.4)
SODIUM SERPL-SCNC: 141 MMOL/L (ref 135–145)
SODIUM SERPL-SCNC: 142 MMOL/L (ref 135–145)
TRIGL SERPL-MCNC: 68 MG/DL (ref 0–149)
VIT B12 SERPL-MCNC: 711 PG/ML (ref 211–911)
WBC # BLD AUTO: 3.5 K/UL (ref 4.8–10.8)
WBC # BLD AUTO: 3.6 K/UL (ref 4.8–10.8)

## 2022-02-03 PROCEDURE — 80053 COMPREHEN METABOLIC PANEL: CPT

## 2022-02-03 PROCEDURE — 80061 LIPID PANEL: CPT

## 2022-02-03 PROCEDURE — 85025 COMPLETE CBC W/AUTO DIFF WBC: CPT

## 2022-02-03 PROCEDURE — 82306 VITAMIN D 25 HYDROXY: CPT

## 2022-02-03 PROCEDURE — 80053 COMPREHEN METABOLIC PANEL: CPT | Mod: 91

## 2022-02-03 PROCEDURE — 85025 COMPLETE CBC W/AUTO DIFF WBC: CPT | Mod: 91

## 2022-02-03 PROCEDURE — 36415 COLL VENOUS BLD VENIPUNCTURE: CPT

## 2022-02-03 PROCEDURE — 82607 VITAMIN B-12: CPT

## 2022-02-03 PROCEDURE — 82746 ASSAY OF FOLIC ACID SERUM: CPT

## 2022-02-08 ENCOUNTER — PATIENT MESSAGE (OUTPATIENT)
Dept: MEDICAL GROUP | Facility: PHYSICIAN GROUP | Age: 52
End: 2022-02-08

## 2022-02-08 RX ORDER — AMITRIPTYLINE HYDROCHLORIDE 25 MG/1
TABLET, FILM COATED ORAL
Qty: 90 TABLET | Refills: 3 | Status: SHIPPED | OUTPATIENT
Start: 2022-02-08 | End: 2022-02-08 | Stop reason: SDUPTHER

## 2022-02-08 RX ORDER — AMITRIPTYLINE HYDROCHLORIDE 25 MG/1
25 TABLET, FILM COATED ORAL
Qty: 90 TABLET | Refills: 3 | Status: SHIPPED | OUTPATIENT
Start: 2022-02-08 | End: 2023-01-04 | Stop reason: SDUPTHER

## 2022-02-09 RX ORDER — ACYCLOVIR 400 MG/1
TABLET ORAL
Qty: 60 TABLET | Refills: 1 | Status: SHIPPED | OUTPATIENT
Start: 2022-02-09 | End: 2022-04-20

## 2022-02-28 ENCOUNTER — OFFICE VISIT (OUTPATIENT)
Dept: NEUROLOGY | Facility: MEDICAL CENTER | Age: 52
End: 2022-02-28
Attending: PSYCHIATRY & NEUROLOGY
Payer: COMMERCIAL

## 2022-02-28 VITALS
TEMPERATURE: 97.2 F | HEIGHT: 64 IN | DIASTOLIC BLOOD PRESSURE: 62 MMHG | HEART RATE: 93 BPM | WEIGHT: 144.62 LBS | RESPIRATION RATE: 16 BRPM | SYSTOLIC BLOOD PRESSURE: 111 MMHG | BODY MASS INDEX: 24.69 KG/M2 | OXYGEN SATURATION: 100 %

## 2022-02-28 DIAGNOSIS — G35 MS (MULTIPLE SCLEROSIS) (HCC): ICD-10-CM

## 2022-02-28 PROCEDURE — 99213 OFFICE O/P EST LOW 20 MIN: CPT | Performed by: PSYCHIATRY & NEUROLOGY

## 2022-02-28 PROCEDURE — 99212 OFFICE O/P EST SF 10 MIN: CPT | Performed by: PSYCHIATRY & NEUROLOGY

## 2022-02-28 ASSESSMENT — FIBROSIS 4 INDEX: FIB4 SCORE: 0.68

## 2022-02-28 ASSESSMENT — PAIN SCALES - GENERAL: PAINLEVEL: NO PAIN

## 2022-02-28 ASSESSMENT — PATIENT HEALTH QUESTIONNAIRE - PHQ9: CLINICAL INTERPRETATION OF PHQ2 SCORE: 0

## 2022-02-28 NOTE — PROGRESS NOTES
Subjective     Georgiana Montalvo is a 51 y.o. female who presents with her  Hemanth, for follow-up on a more urgent basis, with a history of MS, but who has been having problems obtaining the drug and its authorization through their insurance company.  The issue has now resolved itself though the appointment had been made a few weeks ago for this very reason.    HPI    Clinically, Georgiana states she is doing well.  She has had no events consistent with a relapse of her disease, just fluctuations in symptoms, mostly having to do with the fatigue.  She is still on Aubagio 14 mg daily with good tolerability and efficacy.  Imaging studies done last June, 2021 revealed a stable T6/7 lesion and multiple supratentorial lesions.  There was no evidence of enhancement.  This pattern has been stable for over 5 years.  CBC still showed a slight reduction in WBC, though absolute lymphocyte count was normal.  H/H and platelet count is also normal, MCV slightly elevated.  CMP was normal.    The real issue had to do with insurance coverage for Aubagio, something that has been an annual headache for both of them to get it authorized.  This time it went into the last minute before she got it authorized.  The pharmacy supplier, [facility], stated that her drug was authorized for years to come, the insurance company still was doubting and refusing to cover.  In case they are concerned about the future and to what this ensuing data will entail.    Medical, surgical and family histories are reviewed, there are no new drug allergies.  Her migraines are stable.  She is on Aubagio 14 mg daily, Provigil 200 mg daily, Zovirax, Elavil, Elmiron, vitamin D with calcium, Singulair, Pyridium, Maxalt and Zomig.    Review of Systems   Constitutional: Positive for malaise/fatigue.   All other systems reviewed and are negative.      Objective     /62 (BP Location: Right arm, Patient Position: Sitting, BP Cuff Size: Adult)   Pulse 93   Temp 36.2 °C (97.2  "°F) (Temporal)   Resp 16   Ht 1.626 m (5' 4\")   Wt 65.6 kg (144 lb 10 oz)   SpO2 100%   BMI 24.82 kg/m²      Physical Exam    She appears in no acute distress.  Vital signs are stable.  There is no malar rash.  Her neck is supple, range of motion is full.  Lhermitte's phenomena is absent.  Cardiac evaluation is unremarkable.     Neurological Exam    In only quick and cursory fashion, with observation, mental status, cranial nerve, musculoskeletal, and coordination valuations, there is no evidence of new, obvious focal deficit.      Assessment & Plan     1. MS (multiple sclerosis) ([facility])  For now we will stay the course, she is scheduled for repeat MRI this coming April, I will follow up with her in May.  Clinically and radiographically, she is stable.  In the circumstance, we can talk about frequency of imaging moving forwards, and whether or not contrast is really required given how stable her disease is. Fortunately, she has obtained the Aubagio with insurance coverage.  Whether or not additional treatments that are available would be covered moving forwards depends on his insurer and what they are preferred drug lists are.  I recommended they look into what insurances he may be able to obtain once he retires from employment with the [employer] and then investigate their pharmacy coverage.    Time: 20 minutes in total spent on patient care including precharting, record review, discussions with healthcare staff and documentation.  This includes face-to-face time for exam, review, discussion, as well as counseling and coordinating care.        "

## 2022-03-28 SDOH — HEALTH STABILITY: PHYSICAL HEALTH: ON AVERAGE, HOW MANY DAYS PER WEEK DO YOU ENGAGE IN MODERATE TO STRENUOUS EXERCISE (LIKE A BRISK WALK)?: 5 DAYS

## 2022-03-28 SDOH — ECONOMIC STABILITY: FOOD INSECURITY: WITHIN THE PAST 12 MONTHS, THE FOOD YOU BOUGHT JUST DIDN'T LAST AND YOU DIDN'T HAVE MONEY TO GET MORE.: NEVER TRUE

## 2022-03-28 SDOH — ECONOMIC STABILITY: TRANSPORTATION INSECURITY
IN THE PAST 12 MONTHS, HAS THE LACK OF TRANSPORTATION KEPT YOU FROM MEDICAL APPOINTMENTS OR FROM GETTING MEDICATIONS?: NO

## 2022-03-28 SDOH — ECONOMIC STABILITY: HOUSING INSECURITY
IN THE LAST 12 MONTHS, WAS THERE A TIME WHEN YOU DID NOT HAVE A STEADY PLACE TO SLEEP OR SLEPT IN A SHELTER (INCLUDING NOW)?: NO

## 2022-03-28 SDOH — ECONOMIC STABILITY: INCOME INSECURITY: IN THE LAST 12 MONTHS, WAS THERE A TIME WHEN YOU WERE NOT ABLE TO PAY THE MORTGAGE OR RENT ON TIME?: NO

## 2022-03-28 SDOH — ECONOMIC STABILITY: TRANSPORTATION INSECURITY
IN THE PAST 12 MONTHS, HAS LACK OF TRANSPORTATION KEPT YOU FROM MEETINGS, WORK, OR FROM GETTING THINGS NEEDED FOR DAILY LIVING?: NO

## 2022-03-28 SDOH — ECONOMIC STABILITY: FOOD INSECURITY: WITHIN THE PAST 12 MONTHS, YOU WORRIED THAT YOUR FOOD WOULD RUN OUT BEFORE YOU GOT MONEY TO BUY MORE.: NEVER TRUE

## 2022-03-28 SDOH — ECONOMIC STABILITY: HOUSING INSECURITY: IN THE LAST 12 MONTHS, HOW MANY PLACES HAVE YOU LIVED?: 1

## 2022-03-28 SDOH — HEALTH STABILITY: PHYSICAL HEALTH: ON AVERAGE, HOW MANY MINUTES DO YOU ENGAGE IN EXERCISE AT THIS LEVEL?: 40 MIN

## 2022-03-28 SDOH — HEALTH STABILITY: MENTAL HEALTH
STRESS IS WHEN SOMEONE FEELS TENSE, NERVOUS, ANXIOUS, OR CAN'T SLEEP AT NIGHT BECAUSE THEIR MIND IS TROUBLED. HOW STRESSED ARE YOU?: NOT AT ALL

## 2022-03-28 SDOH — ECONOMIC STABILITY: INCOME INSECURITY: HOW HARD IS IT FOR YOU TO PAY FOR THE VERY BASICS LIKE FOOD, HOUSING, MEDICAL CARE, AND HEATING?: NOT HARD AT ALL

## 2022-03-28 SDOH — ECONOMIC STABILITY: TRANSPORTATION INSECURITY
IN THE PAST 12 MONTHS, HAS LACK OF RELIABLE TRANSPORTATION KEPT YOU FROM MEDICAL APPOINTMENTS, MEETINGS, WORK OR FROM GETTING THINGS NEEDED FOR DAILY LIVING?: NO

## 2022-03-28 ASSESSMENT — SOCIAL DETERMINANTS OF HEALTH (SDOH)
HOW OFTEN DO YOU HAVE A DRINK CONTAINING ALCOHOL: 2-4 TIMES A MONTH
IN A TYPICAL WEEK, HOW MANY TIMES DO YOU TALK ON THE PHONE WITH FAMILY, FRIENDS, OR NEIGHBORS?: MORE THAN THREE TIMES A WEEK
HOW OFTEN DO YOU GET TOGETHER WITH FRIENDS OR RELATIVES?: TWICE A WEEK
WITHIN THE PAST 12 MONTHS, YOU WORRIED THAT YOUR FOOD WOULD RUN OUT BEFORE YOU GOT THE MONEY TO BUY MORE: NEVER TRUE
DO YOU BELONG TO ANY CLUBS OR ORGANIZATIONS SUCH AS CHURCH GROUPS UNIONS, FRATERNAL OR ATHLETIC GROUPS, OR SCHOOL GROUPS?: YES
HOW OFTEN DO YOU ATTEND CHURCH OR RELIGIOUS SERVICES?: MORE THAN 4 TIMES PER YEAR
HOW OFTEN DO YOU GET TOGETHER WITH FRIENDS OR RELATIVES?: TWICE A WEEK
HOW OFTEN DO YOU ATTEND CHURCH OR RELIGIOUS SERVICES?: MORE THAN 4 TIMES PER YEAR
HOW OFTEN DO YOU ATTENT MEETINGS OF THE CLUB OR ORGANIZATION YOU BELONG TO?: MORE THAN 4 TIMES PER YEAR
HOW MANY DRINKS CONTAINING ALCOHOL DO YOU HAVE ON A TYPICAL DAY WHEN YOU ARE DRINKING: 1 OR 2
DO YOU BELONG TO ANY CLUBS OR ORGANIZATIONS SUCH AS CHURCH GROUPS UNIONS, FRATERNAL OR ATHLETIC GROUPS, OR SCHOOL GROUPS?: YES
HOW OFTEN DO YOU ATTENT MEETINGS OF THE CLUB OR ORGANIZATION YOU BELONG TO?: MORE THAN 4 TIMES PER YEAR
IN A TYPICAL WEEK, HOW MANY TIMES DO YOU TALK ON THE PHONE WITH FAMILY, FRIENDS, OR NEIGHBORS?: MORE THAN THREE TIMES A WEEK
HOW HARD IS IT FOR YOU TO PAY FOR THE VERY BASICS LIKE FOOD, HOUSING, MEDICAL CARE, AND HEATING?: NOT HARD AT ALL
HOW OFTEN DO YOU HAVE SIX OR MORE DRINKS ON ONE OCCASION: NEVER

## 2022-03-28 ASSESSMENT — LIFESTYLE VARIABLES
HOW OFTEN DO YOU HAVE A DRINK CONTAINING ALCOHOL: 2-4 TIMES A MONTH
HOW MANY STANDARD DRINKS CONTAINING ALCOHOL DO YOU HAVE ON A TYPICAL DAY: 1 OR 2
HOW OFTEN DO YOU HAVE SIX OR MORE DRINKS ON ONE OCCASION: NEVER

## 2022-03-31 ENCOUNTER — HOSPITAL ENCOUNTER (OUTPATIENT)
Dept: LAB | Facility: MEDICAL CENTER | Age: 52
End: 2022-03-31
Attending: INTERNAL MEDICINE
Payer: COMMERCIAL

## 2022-03-31 ENCOUNTER — OFFICE VISIT (OUTPATIENT)
Dept: MEDICAL GROUP | Facility: PHYSICIAN GROUP | Age: 52
End: 2022-03-31
Payer: COMMERCIAL

## 2022-03-31 VITALS
DIASTOLIC BLOOD PRESSURE: 74 MMHG | HEART RATE: 75 BPM | BODY MASS INDEX: 24.07 KG/M2 | TEMPERATURE: 98.7 F | HEIGHT: 64 IN | RESPIRATION RATE: 18 BRPM | WEIGHT: 141 LBS | SYSTOLIC BLOOD PRESSURE: 102 MMHG | OXYGEN SATURATION: 99 %

## 2022-03-31 DIAGNOSIS — G35 MS (MULTIPLE SCLEROSIS) (HCC): ICD-10-CM

## 2022-03-31 DIAGNOSIS — G43.009 MIGRAINE WITHOUT AURA AND WITHOUT STATUS MIGRAINOSUS, NOT INTRACTABLE: ICD-10-CM

## 2022-03-31 DIAGNOSIS — N30.10 CHRONIC INTERSTITIAL CYSTITIS: ICD-10-CM

## 2022-03-31 DIAGNOSIS — E55.9 VITAMIN D DEFICIENCY: ICD-10-CM

## 2022-03-31 DIAGNOSIS — Z12.31 ENCOUNTER FOR SCREENING MAMMOGRAM FOR MALIGNANT NEOPLASM OF BREAST: ICD-10-CM

## 2022-03-31 DIAGNOSIS — R71.8 ELEVATED MCV: ICD-10-CM

## 2022-03-31 DIAGNOSIS — Z13.29 THYROID DISORDER SCREEN: ICD-10-CM

## 2022-03-31 DIAGNOSIS — E78.2 MIXED HYPERLIPIDEMIA: ICD-10-CM

## 2022-03-31 DIAGNOSIS — D70.8 OTHER NEUTROPENIA (HCC): ICD-10-CM

## 2022-03-31 PROCEDURE — 99214 OFFICE O/P EST MOD 30 MIN: CPT | Performed by: INTERNAL MEDICINE

## 2022-03-31 PROCEDURE — 36415 COLL VENOUS BLD VENIPUNCTURE: CPT

## 2022-03-31 PROCEDURE — 86769 SARS-COV-2 COVID-19 ANTIBODY: CPT

## 2022-03-31 ASSESSMENT — FIBROSIS 4 INDEX: FIB4 SCORE: 0.68

## 2022-03-31 NOTE — PROGRESS NOTES
Subjective:     CC:   Chief Complaint   Patient presents with   • Annual Exam         HPI:   Georgiana presents today to discuss the following issues:    The patient is doing well.  With regard to her cholesterol, she recently started a supplement called CholesteRice and has had a significant improvement in her cholesterol levels.  She has some questions about the COVID-19 vaccination as well as monoclonal antibody therapies for immune compromised individuals.  She has received all 3 of her COVID-19 vaccinations, and is now eligible for the fourth booster vaccination.    Past Medical History:   Diagnosis Date   • GERD (gastroesophageal reflux disease)     no improvement with omeprazole.  on prevacid   • Hyperlipidemia 7/22/2019   • Kidney disease     kidney infection as a child, once only, inpatient   • Muscle disorder    • [facility] disease     [facility] or [facility]       Social History     Tobacco Use   • Smoking status: Never Smoker   • Smokeless tobacco: Never Used   Vaping Use   • Vaping Use: Never used   Substance Use Topics   • Alcohol use: Yes     Comment: social   • Drug use: No       Current Outpatient Medications Ordered in Epic   Medication Sig Dispense Refill   • pentosan (ELMIRON) 100 MG Cap TAKE 1 CAPSULE BY MOUTH 3 TIMES A DAY BEFORE MEALS 90 Capsule 11   • acyclovir (ZOVIRAX) 400 MG tablet TAKE 2 TABLETS BY MOUTH TWICE A DAY FOR 5 DAYS 60 Tablet 1   • amitriptyline (ELAVIL) 25 MG Tab Take 1 Tablet by mouth at bedtime. 90 Tablet 3   • modafinil (PROVIGIL) 200 MG Tab TAKE 1 TABLET BY MOUTH EVERY DAY 90 Tablet 2   • Teriflunomide (AUBAGIO) 14 MG Tab Take 1 Tablet by mouth every day. 90 Tablet 3   • zolmitriptan (ZOMIG) 5 MG nasal solution INSTILL 1 SPRAY IN NOSE ONCE A DAY AS NEEDED FOR HEADACHE FOR UP TO 1 DOSE 6 Each 4   • rizatriptan (MAXALT) 10 MG tablet TAKE 1 TABLET BY MOUTH ONCE FOR 1 DOSE FOR MIGRAINE 10 Tablet 11   • montelukast (SINGULAIR) 10 MG Tab TAKE 1 TABLET BY MOUTH IN THE EVENING  "90 tablet 3   • mometasone (NASONEX) 50 MCG/ACT nasal spray SPRAY 2 SPRAYS IN EACH NOSTRIL TWICE A DAY. 1 Each 3   • Nutritional Supplements (JUICE PLUS FIBRE PO) Take  by mouth.     • phenazopyridine (PYRIDIUM) 200 MG Tab Take 1 Tab by mouth 3 times a day as needed. 6 Tab 11   • vitamin D (CHOLECALCIFEROL) 1000 UNIT Tab Take  by mouth every day.     • cetirizine (ZYRTEC) 10 MG Tab Take 10 mg by mouth every day. Half daily       No current [facility]-ordered facility-administered medications on file.       Allergies:  Sulfa drugs    Health Maintenance: Completed    ROS:   Denies any recent fevers or chills. No nausea or vomiting. No chest pains or shortness of breath.      Objective:       Exam:  /74   Pulse 75   Temp 37.1 °C (98.7 °F) (Temporal)   Resp 18   Ht 1.626 m (5' 4\")   Wt 64 kg (141 lb)   LMP 04/25/2018   SpO2 99%   BMI 24.20 kg/m²  Body mass index is 24.2 kg/m².    Gen: Alert and oriented, No apparent distress.  Lungs: Normal effort, CTA bilaterally, no wheezes, rhonchi, or rales  CV: Regular rate and rhythm. No murmurs, rubs, or gallops.  Ext: No clubbing, cyanosis, edema.        Assessment & Plan:     51 y.o. female with the following -     MS (multiple sclerosis) (HCC)  This is a chronic medical condition. The patient is followed by neurology, Dr. Jean Marie Belcher.  She has not had any flares requiring hospitalization since her initial diagnosis.  Her main symptoms are fatigue and leg numbness.  The patient is up-to-date on her COVID-19 vaccinations, and is now eligible for the fourth booster vaccine.  Her response to the vaccination series is not known.  We will check COVID-19 antibody levels.  If she has not had an adequate response to the vaccination series she would be a candidate for EvuSheld every 6 months.  She is also a candidate for acute treatment of COVID-19 infection with monoclonal antibody therapy.  She was advised to contact me should she develop symptoms.  -Continue Aubagio 14 mg " daily  -Continue modafinil 200 mg as needed for MS associated fatigue  - Covid-19 IgG (Patrice); Future     IC (interstitial cystitis)  This is a chronic medical condition.  Well-controlled. The patient states her symptoms are well controlled on amitriptyline and Pitocin.   -Continue amitriptyline 25 mg daily  -Continue pentosan 100 mg 3 times daily  -Continue phenazopyridine 200 mg 3 times daily as needed     Migraine without aura and without status migrainosus, not intractable  This is a chronic medical condition.  Well-controlled. She states her migraines are effectively controlled with rizatriptan or zolmitriptan abortive therapy as needed.  -Continue rizatriptan 10 mg or zolmitriptan 5 mg nasal spray as needed    Other neutropenia (HCC)  This is a chronic medical condition.  Stable.  Secondary to the patient's Aubagio treatment.  The patient's most recent labs from 2/3/2022 showed a reduced WBC count of 3.6 with an ANC of 1010.  - CBC WITH DIFFERENTIAL; Future    Elevated MCV  This is a chronic medical condition.  Stable.  Likely secondary to the patient's MS medications.  The patient's most recent MCV on 2/3/2022 was stable at 101.  Folic acid level was normal at 22.6.  B12 level was normal at 711.  - CBC WITH DIFFERENTIAL; Future    Mixed hyperlipidemia  This is a chronic medical condition.  Improved.  The patient is diet controlled.  She recently started a red yeast rice-based supplement called CholesteRice. Lipid panel from 2/3/2022 showed a total cholesterol 212, , HDL 84, triglycerides 68. The 10-year ASCVD risk score (Sarah DC Jr., et al., 2013) is: 0.6%  -Continue dietary management with a low-cholesterol diet given the patient's low 10-year ASCVD risk score  -Continue CholesteRice red yeast rice-based supplement daily  - Comp Metabolic Panel; Future  - Lipid Profile; Future     Vitamin D deficiency  This is a chronic medical condition.  Stable.  It is important for the patient to maintain adequate  vitamin D levels given her diagnosis of MS.  Vitamin D level from 2/3/2022 was normal at 68.  - VITAMIN D,25 HYDROXY; Future    Thyroid disorder screen  - FREE THYROXINE; Future  - TSH; Future    Encounter for screening mammogram for malignant neoplasm of breast  - MA-SCREENING MAMMO BILAT W/TOMOSYNTHESIS W/CAD; Future      Return in about 1 year (around 3/31/2023) for Annual/Wellness Visit.    Please note that this dictation was created using voice recognition software. I have made every reasonable attempt to correct obvious errors, but I expect that there are errors of grammar and possibly content that I did not discover before finalizing the note.

## 2022-04-02 LAB
SARS-COV-2 IGG SERPL IA-ACNC: 52.89 IV
SARS-COV-2 IGG SERPL QL IA: POSITIVE

## 2022-04-20 RX ORDER — ACYCLOVIR 400 MG/1
TABLET ORAL
Qty: 60 TABLET | Refills: 1 | Status: SHIPPED | OUTPATIENT
Start: 2022-04-20 | End: 2022-05-17

## 2022-05-12 ENCOUNTER — HOSPITAL ENCOUNTER (OUTPATIENT)
Dept: RADIOLOGY | Facility: MEDICAL CENTER | Age: 52
End: 2022-05-12
Attending: PSYCHIATRY & NEUROLOGY
Payer: COMMERCIAL

## 2022-05-12 DIAGNOSIS — G35 MS (MULTIPLE SCLEROSIS) (HCC): ICD-10-CM

## 2022-05-12 PROCEDURE — 72157 MRI CHEST SPINE W/O & W/DYE: CPT

## 2022-05-12 PROCEDURE — 70553 MRI BRAIN STEM W/O & W/DYE: CPT

## 2022-05-12 PROCEDURE — 700117 HCHG RX CONTRAST REV CODE 255: Performed by: PSYCHIATRY & NEUROLOGY

## 2022-05-12 PROCEDURE — A9576 INJ PROHANCE MULTIPACK: HCPCS | Performed by: PSYCHIATRY & NEUROLOGY

## 2022-05-12 RX ADMIN — GADOTERIDOL 12 ML: 279.3 INJECTION, SOLUTION INTRAVENOUS at 13:49

## 2022-05-16 ENCOUNTER — APPOINTMENT (OUTPATIENT)
Dept: NEUROLOGY | Facility: MEDICAL CENTER | Age: 52
End: 2022-05-16
Attending: PSYCHIATRY & NEUROLOGY
Payer: COMMERCIAL

## 2022-05-31 DIAGNOSIS — G43.009 MIGRAINE WITHOUT AURA AND WITHOUT STATUS MIGRAINOSUS, NOT INTRACTABLE: ICD-10-CM

## 2022-06-01 RX ORDER — ZOLMITRIPTAN 5 MG/1
SPRAY NASAL
Qty: 6 EACH | Refills: 4 | Status: SHIPPED | OUTPATIENT
Start: 2022-06-01 | End: 2022-07-07

## 2022-07-05 RX ORDER — MONTELUKAST SODIUM 10 MG/1
TABLET ORAL
Qty: 90 TABLET | Refills: 3 | Status: SHIPPED | OUTPATIENT
Start: 2022-07-05 | End: 2023-07-13 | Stop reason: SDUPTHER

## 2022-07-11 ENCOUNTER — OFFICE VISIT (OUTPATIENT)
Dept: NEUROLOGY | Facility: MEDICAL CENTER | Age: 52
End: 2022-07-11
Attending: PSYCHIATRY & NEUROLOGY
Payer: COMMERCIAL

## 2022-07-11 VITALS
HEART RATE: 70 BPM | OXYGEN SATURATION: 98 % | RESPIRATION RATE: 18 BRPM | DIASTOLIC BLOOD PRESSURE: 64 MMHG | SYSTOLIC BLOOD PRESSURE: 98 MMHG

## 2022-07-11 DIAGNOSIS — G35 MS (MULTIPLE SCLEROSIS) (HCC): ICD-10-CM

## 2022-07-11 DIAGNOSIS — G43.009 MIGRAINE WITHOUT AURA AND WITHOUT STATUS MIGRAINOSUS, NOT INTRACTABLE: ICD-10-CM

## 2022-07-11 PROCEDURE — 99214 OFFICE O/P EST MOD 30 MIN: CPT | Performed by: PSYCHIATRY & NEUROLOGY

## 2022-07-11 PROCEDURE — 99212 OFFICE O/P EST SF 10 MIN: CPT | Performed by: PSYCHIATRY & NEUROLOGY

## 2022-07-11 RX ORDER — ESTRADIOL 0.1 MG/G
CREAM VAGINAL
COMMUNITY
Start: 2022-05-29 | End: 2023-05-24 | Stop reason: SDUPTHER

## 2022-07-11 ASSESSMENT — ENCOUNTER SYMPTOMS
MEMORY LOSS: 0
CONSTIPATION: 0
SEIZURES: 0
SENSORY CHANGE: 0
TINGLING: 0
FOCAL WEAKNESS: 0
SPEECH CHANGE: 0
HEADACHES: 1

## 2022-07-11 NOTE — PROGRESS NOTES
Subjective     Essie Montalvo is a 51 y.o. female who presents with her  Hemanth, for follow-up, with a history of MS and migraine without aura.    HPI    Since last seen in February of this year, Essie states that she has had nothing to suggest disease activity or relapse.  Other than her fatigue, she has denied any issues with cognitive function, emotional lability, she has never had issues with visual loss, diplopia, or bulbar symptoms.  Focal weakness, sensory loss or incoordination as well had never been sustained symptoms.  Bowel and bladder control are maintained, she denies spinal cord sensory levels other than the history of a thoracic level constrictive sensation that was part of her original presentation.    She did just undergo MRI imaging of the brain and thoracic spines, revealing nonspecific white matter changes and the midthoracic T6/7 intramedullary cord lesion, all without enhancement.  CBC and CMP values checked earlier this year were unremarkable.  WBC count was low but absolute lymphocyte count normal.    She is remained on Aubagio 14 mg daily.  She uses Provigil 100 mg, twice daily.    She remains using the Zomig and Maxalt, has found that the Zomig taken initially before nausea involves actually works wonders.  The headaches still require use of medication for 4 straight days, and then they resolve and she can be headache free for weeks at a time only to recur without clear pattern or cause.    Medical, surgical, family histories are reviewed, there are no new drug allergies.  She uses her Pyridium for her interstitial cystitis.  She is on Aubagio 14 mg daily, Provigil 100 mg, twice daily, Zomig 5 mg or Maxalt 10 mg as needed, Pyridium as needed, Zovirax, Elavil 25 mg nightly, Estrace, vitamin D, Elmiron, and Singulair.    Review of Systems   Constitutional: Positive for malaise/fatigue.   Gastrointestinal: Negative for constipation.   Genitourinary: Negative for dysuria.    Neurological: Positive for headaches. Negative for tingling, sensory change, speech change, focal weakness and seizures.   Psychiatric/Behavioral: Negative for memory loss.   All other systems reviewed and are negative.    Objective     BP (!) 98/64 (BP Location: Right arm, Patient Position: Sitting, BP Cuff Size: Adult)   Pulse 70   Resp 18   LMP 04/25/2018   SpO2 98%      Physical Exam    She appears in no acute distress. Vital signs are stable.  There is no malar rash, jaw or temporal tenderness, or jaw claudication.  The neck is supple, range of motion is full, Lhermitte's phenomena is absent.  Cardiac evaluation is unremarkable.     Neurological Exam    Fully oriented, there is no aphasia, apraxia, or inattention.    PERRLA/EOMI, visual fields are full to finger counting on confrontation bilaterally.  Facial movements are symmetric, sensory exam is intact to temperature.  The tongue and uvula are midline, there is no bulbar dysfunction.  Shoulder shrug and head rotation are normal.    Musculoskeletal exam again reveals normal tone throughout, there is no tremor, asterixis, or drift.  Strength is 5/5 throughout.  Reflexes are present without asymmetries, none are dropped.  Both toes are downgoing.    She stands easily, armswing is symmetric, gait is normal and station and stride length.  Heel, toe, and tandem walking are all normal.  There is no appendicular dystaxia.  Fine motor control is intact in all 4 extremities, amplitude and frequencies normal.    Sensory exam is intact to vibration and temperature, Romberg is absent.    Assessment & Plan     1. MS (multiple sclerosis) (HCC)  Stable, we will continue the Aubagio unchanged.  She needs annual CBC and CMP checks, she gets this on her routine physical exam in February of each year, I am quite comfortable with this.  Imaging can now be extended to every other year since she has been stable for at least 5 years, both radiographically and clinically.   Rationale for this was reviewed, she understands and is comfortable with this recommendation.  She knows she can contact the office if she were to have anything suggestive of disease relapse.  Thus, we will see each other at this time next year, and then will order MRI scan of the brain and thoracic spines to be done in mid 2024 before her annual follow-up.    2. Migraine without aura and without status migrainosus, not intractable  Stable, she will use the Zomig with her Maxalt as needed.  Headache pattern has not changed enough to warrant maintenance therapies.  We will see each other in 1 year.    Time: 20 minutes in total spent on patient care including per charting, record review, discussion with healthcare staff and documentation.  This includes face-to-face time with the patient for exam, review, discussion, as well as counseling and coordinating care.

## 2022-07-18 RX ORDER — ACYCLOVIR 400 MG/1
TABLET ORAL
Qty: 60 TABLET | Refills: 1 | Status: SHIPPED | OUTPATIENT
Start: 2022-07-18 | End: 2022-07-28

## 2022-07-28 RX ORDER — ACYCLOVIR 400 MG/1
TABLET ORAL
Qty: 60 TABLET | Refills: 1 | Status: SHIPPED | OUTPATIENT
Start: 2022-07-28 | End: 2022-10-17

## 2022-08-10 RX ORDER — MOMETASONE FUROATE 50 UG/1
SPRAY, METERED NASAL
Qty: 17 G | Refills: 3 | Status: SHIPPED | OUTPATIENT
Start: 2022-08-10 | End: 2023-06-27 | Stop reason: SDUPTHER

## 2022-08-11 DIAGNOSIS — G43.009 MIGRAINE WITHOUT AURA AND WITHOUT STATUS MIGRAINOSUS, NOT INTRACTABLE: ICD-10-CM

## 2022-08-23 DIAGNOSIS — G43.009 MIGRAINE WITHOUT AURA AND WITHOUT STATUS MIGRAINOSUS, NOT INTRACTABLE: ICD-10-CM

## 2022-08-23 RX ORDER — ZOLMITRIPTAN 5 MG/1
SPRAY NASAL
OUTPATIENT
Start: 2022-08-23

## 2022-08-23 RX ORDER — ZOLMITRIPTAN 5 MG/1
SPRAY NASAL
Qty: 6 EACH | Refills: 11 | Status: SHIPPED | OUTPATIENT
Start: 2022-08-23 | End: 2022-10-06

## 2022-10-10 DIAGNOSIS — N30.10 IC (INTERSTITIAL CYSTITIS): ICD-10-CM

## 2022-10-10 RX ORDER — PHENAZOPYRIDINE HYDROCHLORIDE 200 MG/1
200 TABLET, FILM COATED ORAL 3 TIMES DAILY PRN
Qty: 30 TABLET | Refills: 2 | Status: SHIPPED | OUTPATIENT
Start: 2022-10-10

## 2022-10-17 RX ORDER — ACYCLOVIR 400 MG/1
TABLET ORAL
Qty: 60 TABLET | Refills: 1 | Status: SHIPPED | OUTPATIENT
Start: 2022-10-17 | End: 2022-12-12

## 2022-11-17 ENCOUNTER — HOSPITAL ENCOUNTER (OUTPATIENT)
Dept: RADIOLOGY | Facility: MEDICAL CENTER | Age: 52
End: 2022-11-17
Attending: INTERNAL MEDICINE
Payer: COMMERCIAL

## 2022-11-17 DIAGNOSIS — Z12.31 ENCOUNTER FOR SCREENING MAMMOGRAM FOR MALIGNANT NEOPLASM OF BREAST: ICD-10-CM

## 2022-11-17 PROCEDURE — 77063 BREAST TOMOSYNTHESIS BI: CPT

## 2022-12-12 RX ORDER — ACYCLOVIR 400 MG/1
TABLET ORAL
Qty: 60 TABLET | Refills: 1 | Status: SHIPPED | OUTPATIENT
Start: 2022-12-12 | End: 2023-02-14

## 2022-12-12 NOTE — TELEPHONE ENCOUNTER
Received request via: Patient    Was the patient seen in the last year in this department? Yes    Does the patient have an active prescription (recently filled or refills available) for medication(s) requested? No    Does the patient have shelter Plus and need 100 day supply (blood pressure, diabetes and cholesterol meds only)? Patient does not have SCP

## 2023-01-04 RX ORDER — AMITRIPTYLINE HYDROCHLORIDE 25 MG/1
25 TABLET, FILM COATED ORAL
Qty: 90 TABLET | Refills: 3 | Status: SHIPPED | OUTPATIENT
Start: 2023-01-04 | End: 2023-11-27 | Stop reason: SDUPTHER

## 2023-04-03 DIAGNOSIS — Z13.1 ENCOUNTER FOR SCREENING FOR DIABETES MELLITUS: ICD-10-CM

## 2023-04-03 DIAGNOSIS — Z13.21 ENCOUNTER FOR VITAMIN DEFICIENCY SCREENING: ICD-10-CM

## 2023-04-03 DIAGNOSIS — Z13.0 SCREENING FOR DEFICIENCY ANEMIA: ICD-10-CM

## 2023-04-03 DIAGNOSIS — E78.2 MIXED HYPERLIPIDEMIA: ICD-10-CM

## 2023-04-03 DIAGNOSIS — Z13.29 THYROID DISORDER SCREEN: ICD-10-CM

## 2023-04-06 ENCOUNTER — HOSPITAL ENCOUNTER (OUTPATIENT)
Dept: LAB | Facility: MEDICAL CENTER | Age: 53
End: 2023-04-06
Attending: INTERNAL MEDICINE
Payer: COMMERCIAL

## 2023-04-06 DIAGNOSIS — E78.2 MIXED HYPERLIPIDEMIA: ICD-10-CM

## 2023-04-06 DIAGNOSIS — Z13.0 SCREENING FOR DEFICIENCY ANEMIA: ICD-10-CM

## 2023-04-06 DIAGNOSIS — Z13.1 ENCOUNTER FOR SCREENING FOR DIABETES MELLITUS: ICD-10-CM

## 2023-04-06 DIAGNOSIS — Z13.21 ENCOUNTER FOR VITAMIN DEFICIENCY SCREENING: ICD-10-CM

## 2023-04-06 DIAGNOSIS — Z13.29 THYROID DISORDER SCREEN: ICD-10-CM

## 2023-04-06 LAB
25(OH)D3 SERPL-MCNC: 59 NG/ML (ref 30–100)
ALBUMIN SERPL BCP-MCNC: 4.5 G/DL (ref 3.2–4.9)
ALBUMIN/GLOB SERPL: 1.6 G/DL
ALP SERPL-CCNC: 79 U/L (ref 30–99)
ALT SERPL-CCNC: 20 U/L (ref 2–50)
ANION GAP SERPL CALC-SCNC: 11 MMOL/L (ref 7–16)
AST SERPL-CCNC: 17 U/L (ref 12–45)
BASOPHILS # BLD AUTO: 1.5 % (ref 0–1.8)
BASOPHILS # BLD: 0.06 K/UL (ref 0–0.12)
BILIRUB SERPL-MCNC: 0.5 MG/DL (ref 0.1–1.5)
BUN SERPL-MCNC: 13 MG/DL (ref 8–22)
CALCIUM ALBUM COR SERPL-MCNC: 9.2 MG/DL (ref 8.5–10.5)
CALCIUM SERPL-MCNC: 9.6 MG/DL (ref 8.5–10.5)
CHLORIDE SERPL-SCNC: 108 MMOL/L (ref 96–112)
CHOLEST SERPL-MCNC: 230 MG/DL (ref 100–199)
CO2 SERPL-SCNC: 24 MMOL/L (ref 20–33)
CREAT SERPL-MCNC: 0.68 MG/DL (ref 0.5–1.4)
EOSINOPHIL # BLD AUTO: 0.18 K/UL (ref 0–0.51)
EOSINOPHIL NFR BLD: 4.6 % (ref 0–6.9)
ERYTHROCYTE [DISTWIDTH] IN BLOOD BY AUTOMATED COUNT: 49.6 FL (ref 35.9–50)
FASTING STATUS PATIENT QL REPORTED: NORMAL
GFR SERPLBLD CREATININE-BSD FMLA CKD-EPI: 104 ML/MIN/1.73 M 2
GLOBULIN SER CALC-MCNC: 2.8 G/DL (ref 1.9–3.5)
GLUCOSE SERPL-MCNC: 89 MG/DL (ref 65–99)
HCT VFR BLD AUTO: 41.4 % (ref 37–47)
HDLC SERPL-MCNC: 80 MG/DL
HGB BLD-MCNC: 13.1 G/DL (ref 12–16)
IMM GRANULOCYTES # BLD AUTO: 0.01 K/UL (ref 0–0.11)
IMM GRANULOCYTES NFR BLD AUTO: 0.3 % (ref 0–0.9)
LDLC SERPL CALC-MCNC: 137 MG/DL
LYMPHOCYTES # BLD AUTO: 1.86 K/UL (ref 1–4.8)
LYMPHOCYTES NFR BLD: 47.7 % (ref 22–41)
MCH RBC QN AUTO: 32.9 PG (ref 27–33)
MCHC RBC AUTO-ENTMCNC: 31.6 G/DL (ref 33.6–35)
MCV RBC AUTO: 104 FL (ref 81.4–97.8)
MONOCYTES # BLD AUTO: 0.49 K/UL (ref 0–0.85)
MONOCYTES NFR BLD AUTO: 12.6 % (ref 0–13.4)
NEUTROPHILS # BLD AUTO: 1.3 K/UL (ref 2–7.15)
NEUTROPHILS NFR BLD: 33.3 % (ref 44–72)
NRBC # BLD AUTO: 0 K/UL
NRBC BLD-RTO: 0 /100 WBC
PLATELET # BLD AUTO: 342 K/UL (ref 164–446)
PMV BLD AUTO: 10.5 FL (ref 9–12.9)
POTASSIUM SERPL-SCNC: 4.4 MMOL/L (ref 3.6–5.5)
PROT SERPL-MCNC: 7.3 G/DL (ref 6–8.2)
RBC # BLD AUTO: 3.98 M/UL (ref 4.2–5.4)
SODIUM SERPL-SCNC: 143 MMOL/L (ref 135–145)
T3FREE SERPL-MCNC: 2.91 PG/ML (ref 2–4.4)
T4 FREE SERPL-MCNC: 0.95 NG/DL (ref 0.93–1.7)
TRIGL SERPL-MCNC: 66 MG/DL (ref 0–149)
TSH SERPL DL<=0.005 MIU/L-ACNC: 4.39 UIU/ML (ref 0.38–5.33)
WBC # BLD AUTO: 3.9 K/UL (ref 4.8–10.8)

## 2023-04-06 PROCEDURE — 80053 COMPREHEN METABOLIC PANEL: CPT

## 2023-04-06 PROCEDURE — 84481 FREE ASSAY (FT-3): CPT

## 2023-04-06 PROCEDURE — 82306 VITAMIN D 25 HYDROXY: CPT

## 2023-04-06 PROCEDURE — 84439 ASSAY OF FREE THYROXINE: CPT

## 2023-04-06 PROCEDURE — 80061 LIPID PANEL: CPT

## 2023-04-06 PROCEDURE — 85025 COMPLETE CBC W/AUTO DIFF WBC: CPT

## 2023-04-06 PROCEDURE — 84443 ASSAY THYROID STIM HORMONE: CPT

## 2023-04-06 PROCEDURE — 36415 COLL VENOUS BLD VENIPUNCTURE: CPT

## 2023-04-24 NOTE — PROGRESS NOTES
Subjective:     CC:   Chief Complaint   Patient presents with    Lab Results         HPI:   Essie presents today for follow-up visit to review recent lab results.  Her main complaint today is paresthesias. The patient reports a burning sensation of her bilateral feet over the last couple of years, but recent progression of the last 3 months.  She denies any numbness or skin changes.  She states wearing like cotton socks helps it.  The pain is in the distal portion of her feet.  Reviewed with the patient her CBC results which show a stable MCV of 104 and an ANC of 1.30, likely secondary to her MS and treatment.  Reviewed her lipid panel from 4/6/2023 which showed a total cholesterol 2 3, , HDL 80, triglycerides 66.  Her 10-year ASCVD risk score is 0.8%.  She will continue dietary management.  Her vitamin D level was normal at 59.        Past Medical History:   Diagnosis Date    GERD (gastroesophageal reflux disease)     no improvement with omeprazole.  on prevacid    Hyperlipidemia 7/22/2019    Kidney disease     kidney infection as a child, once only, inpatient    Muscle disorder     Thyroid disease      NL. no treatments or procedures       Social History     Tobacco Use    Smoking status: Never    Smokeless tobacco: Never   Vaping Use    Vaping Use: Never used   Substance Use Topics    Alcohol use: Yes     Comment: social    Drug use: No       Current Outpatient Medications Ordered in Epic   Medication Sig Dispense Refill    acyclovir (ZOVIRAX) 400 MG tablet TAKE 2 TABLETS BY MOUTH TWICE A DAY FOR 5 DAYS 60 Tablet 3    modafinil (PROVIGIL) 200 MG Tab TAKE 1 TABLET BY MOUTH EVERY DAY FOR 30 DAYS. G35 30 Tablet 5    AUBAGIO 14 MG Tab TAKE 1 TABLET BY MOUTH EVERY DAY 30 Tablet 11    amitriptyline (ELAVIL) 25 MG Tab Take 1 Tablet by mouth at bedtime. 90 Tablet 3    phenazopyridine (PYRIDIUM) 200 MG Tab Take 1 Tablet by mouth 3 times a day as needed for Moderate Pain. 30 Tablet 2    zolmitriptan (ZOMIG) 5  "MG nasal solution INSTILL 1 SPRAY IN NOSE ONCE A DAY AS NEEDED FOR HEADACHE FOR UP TO 1 DOSE 6 Each 11    rizatriptan (MAXALT) 10 MG tablet TAKE 1 TABLET BY MOUTH AT HEADACHE ONSET; REPEAT EVERY 1-2 HOURS PRN UP TO #3 TAB/24 HOUR 10 Tablet 5    mometasone (NASONEX) 50 MCG/ACT nasal spray SPRAY 2 SPRAYS INTO EACH NOSTRIL TWICE A DAY 17 g 3    montelukast (SINGULAIR) 10 MG Tab TAKE 1 TABLET BY MOUTH IN THE EVENING 90 Tablet 3    pentosan (ELMIRON) 100 MG Cap TAKE 1 CAPSULE BY MOUTH 3 TIMES A DAY BEFORE MEALS 90 Capsule 11    Nutritional Supplements (JUICE PLUS FIBRE PO) Take  by mouth.      vitamin D (CHOLECALCIFEROL) 1000 UNIT Tab Take  by mouth every day.      cetirizine (ZYRTEC) 10 MG Tab Take 10 mg by mouth every day. Half daily      estradiol (ESTRACE) 0.1 MG/GM vaginal cream INSERT 1 GRAM VAGINALLY TWO TIMES A WEEK 30 DAYS 42.5 g 3     No current [facility]-ordered facility-administered medications on file.       Allergies:  Sulfa drugs    Health Maintenance: Completed    Review of Systems:  No fevers or chills. No cough, chest pain, or shortness of breath.       Objective:       Exam:  /60   Pulse 74   Temp 36.9 °C (98.5 °F) (Temporal)   Ht 1.626 m (5' 4\")   Wt 65 kg (143 lb 4.8 oz)   LMP 04/25/2018   SpO2 100%   BMI 24.60 kg/m²  Body mass index is 24.6 kg/m².    Gen: Alert and oriented, No apparent distress.  Lungs: Normal effort, CTA bilaterally, no wheezes, rhonchi, or rales  CV: Regular rate and rhythm. No murmurs, rubs, or gallops.  Ext: No clubbing, cyanosis, edema.        Assessment & Plan:     52 y.o. female with the following -     MS (multiple sclerosis) (HCC)  Neutropenia associated with autoimmune disease (HCC)  Elevated MCV  Chronic condition, controlled.  The patient is managed by neurology, Dr. Shelley.  No flares requiring hospitalization since last visit.  Main symptoms continue to be fatigue and leg numbness.  Most recent CBC showed an elevated MCV of 104 and an ANC of 1.30.  -Continue " Aubagio 14 mg daily and modafinil 200 mg as needed for MS associated fatigue as managed by neurology  - CBC WITH DIFFERENTIAL; Future    Mixed hyperlipidemia  Chronic condition, progressive.  Currently managed through diet and exercise as well as a red yeast rice based supplement called CholesteRice.  Lipid panel on 4/6/2023 showed a total cholesterol 230, , HDL 80, triglycerides 66. The 10-year ASCVD risk score (Nori LUJAN, et al., 2019) is: 0.8%  -Continue dietary management with a low-cholesterol diet given the patient's low 10-year ASCVD risk score  - Comp Metabolic Panel; Future  - Lipid Profile; Future     Vitamin D deficiency  Chronic condition, controlled.  Vitamin D level on 4/6/2023 is 59.   -Continue vitamin D 1000 units daily    Paresthesias  Acute condition.  The patient reports a burning sensation of her bilateral feet over the last couple of years, but recent progression of the last 3 months.  She denies any numbness or skin changes.  She states wearing like cotton socks helps it.  The pain is in the distal portion of her feet.  Description is consistent with a peripheral neuropathy, but etiology unclear.  We will check vitamin levels as well as get an EMG/NCS.  - VITAMIN B6; Future  - VITAMIN B12; Future  - Referral to Neurodiagnostics (EEG,EP,EMG/NCS/DBS)  - FOLATES RBC (WITH HCT); Future    Encounter for screening mammogram for malignant neoplasm of breast  - MA-SCREENING MAMMO BILAT W/TOMOSYNTHESIS W/CAD; Future    Need for vaccination  - Hepatitis B Vaccine Adult IM      Return in about 1 year (around 4/25/2024) for Annual/Wellness Visit.    Please note that this dictation was created using voice recognition software. I have made every reasonable attempt to correct obvious errors, but I expect that there are errors of grammar and possibly content that I did not discover before finalizing the note.

## 2023-04-25 ENCOUNTER — OFFICE VISIT (OUTPATIENT)
Dept: MEDICAL GROUP | Facility: PHYSICIAN GROUP | Age: 53
End: 2023-04-25
Payer: COMMERCIAL

## 2023-04-25 VITALS
DIASTOLIC BLOOD PRESSURE: 60 MMHG | SYSTOLIC BLOOD PRESSURE: 108 MMHG | TEMPERATURE: 98.5 F | WEIGHT: 143.3 LBS | HEIGHT: 64 IN | OXYGEN SATURATION: 100 % | HEART RATE: 74 BPM | BODY MASS INDEX: 24.46 KG/M2

## 2023-04-25 DIAGNOSIS — Z23 NEED FOR VACCINATION: ICD-10-CM

## 2023-04-25 DIAGNOSIS — D70.4 NEUTROPENIA ASSOCIATED WITH AUTOIMMUNE DISEASE (HCC): ICD-10-CM

## 2023-04-25 DIAGNOSIS — R20.2 PARESTHESIAS: ICD-10-CM

## 2023-04-25 DIAGNOSIS — G35 MS (MULTIPLE SCLEROSIS) (HCC): ICD-10-CM

## 2023-04-25 DIAGNOSIS — E55.9 VITAMIN D DEFICIENCY: ICD-10-CM

## 2023-04-25 DIAGNOSIS — R71.8 ELEVATED MCV: ICD-10-CM

## 2023-04-25 DIAGNOSIS — E78.2 MIXED HYPERLIPIDEMIA: ICD-10-CM

## 2023-04-25 DIAGNOSIS — Z12.31 ENCOUNTER FOR SCREENING MAMMOGRAM FOR MALIGNANT NEOPLASM OF BREAST: ICD-10-CM

## 2023-04-25 DIAGNOSIS — M35.9 NEUTROPENIA ASSOCIATED WITH AUTOIMMUNE DISEASE (HCC): ICD-10-CM

## 2023-04-25 PROCEDURE — 3078F DIAST BP <80 MM HG: CPT | Performed by: INTERNAL MEDICINE

## 2023-04-25 PROCEDURE — 99214 OFFICE O/P EST MOD 30 MIN: CPT | Mod: 25 | Performed by: INTERNAL MEDICINE

## 2023-04-25 PROCEDURE — 3074F SYST BP LT 130 MM HG: CPT | Performed by: INTERNAL MEDICINE

## 2023-04-25 PROCEDURE — 90471 IMMUNIZATION ADMIN: CPT | Performed by: INTERNAL MEDICINE

## 2023-04-25 PROCEDURE — 90746 HEPB VACCINE 3 DOSE ADULT IM: CPT | Performed by: INTERNAL MEDICINE

## 2023-04-25 ASSESSMENT — FIBROSIS 4 INDEX: FIB4 SCORE: 0.58

## 2023-05-15 ENCOUNTER — HOSPITAL ENCOUNTER (OUTPATIENT)
Dept: LAB | Facility: MEDICAL CENTER | Age: 53
End: 2023-05-15
Attending: INTERNAL MEDICINE
Payer: COMMERCIAL

## 2023-05-15 DIAGNOSIS — R20.2 PARESTHESIAS: ICD-10-CM

## 2023-05-15 LAB
HCT VFR BLD AUTO: 41.5 % (ref 37–47)
VIT B12 SERPL-MCNC: 670 PG/ML (ref 211–911)

## 2023-05-15 PROCEDURE — 82747 ASSAY OF FOLIC ACID RBC: CPT

## 2023-05-15 PROCEDURE — 85014 HEMATOCRIT: CPT

## 2023-05-15 PROCEDURE — 82607 VITAMIN B-12: CPT

## 2023-05-15 PROCEDURE — 36415 COLL VENOUS BLD VENIPUNCTURE: CPT

## 2023-05-15 PROCEDURE — 84207 ASSAY OF VITAMIN B-6: CPT

## 2023-05-17 LAB — FOLATE RBC-MCNC: 784 NG/ML

## 2023-05-20 LAB — VIT B6 SERPL-MCNC: 72.9 NMOL/L (ref 20–125)

## 2023-05-24 RX ORDER — ESTRADIOL 0.1 MG/G
CREAM VAGINAL
Qty: 42.5 G | Refills: 3 | Status: SHIPPED | OUTPATIENT
Start: 2023-05-24

## 2023-05-26 ENCOUNTER — NON-PROVIDER VISIT (OUTPATIENT)
Dept: MEDICAL GROUP | Facility: PHYSICIAN GROUP | Age: 53
End: 2023-05-26
Payer: COMMERCIAL

## 2023-05-26 DIAGNOSIS — Z23 NEED FOR VACCINATION: ICD-10-CM

## 2023-05-26 PROCEDURE — 90746 HEPB VACCINE 3 DOSE ADULT IM: CPT | Performed by: INTERNAL MEDICINE

## 2023-05-26 PROCEDURE — 90471 IMMUNIZATION ADMIN: CPT | Performed by: INTERNAL MEDICINE

## 2023-05-26 NOTE — PROGRESS NOTES
"Essie Dasia Montalvo is a 52 y.o. female here for a non-provider visit for:   HEPATITIS B 2 of 2    Reason for immunization: Overdue/Provider Recommended  Immunization records indicate need for vaccine: Yes, confirmed with Epic  Minimum interval has been met for this vaccine: Yes  ABN completed: Yes    VIS Dated  11- was given to patient: Yes  All IAC Questionnaire questions were answered \"No.\"    Patient tolerated injection and no adverse effects were observed or reported: Yes    Pt scheduled for next dose in series: No  "

## 2023-06-06 DIAGNOSIS — G43.009 MIGRAINE WITHOUT AURA AND WITHOUT STATUS MIGRAINOSUS, NOT INTRACTABLE: ICD-10-CM

## 2023-06-06 RX ORDER — RIZATRIPTAN BENZOATE 10 MG/1
TABLET ORAL
Qty: 10 TABLET | Refills: 5 | Status: SHIPPED | OUTPATIENT
Start: 2023-06-06 | End: 2023-11-30

## 2023-06-06 NOTE — TELEPHONE ENCOUNTER
Received request via: Pharmacy    Was the patient seen in the last year in this department? Yes    Does the patient have an active prescription (recently filled or refills available) for medication(s) requested? Yes.     Does the patient have half-way Plus and need 100 day supply (blood pressure, diabetes and cholesterol meds only)? Medication is not for cholesterol, blood pressure or diabetes  
No

## 2023-06-28 RX ORDER — MOMETASONE FUROATE 50 UG/1
SPRAY, METERED NASAL
Qty: 17 G | Refills: 3 | Status: SHIPPED | OUTPATIENT
Start: 2023-06-28 | End: 2024-03-05 | Stop reason: SDUPTHER

## 2023-07-15 RX ORDER — MONTELUKAST SODIUM 10 MG/1
TABLET ORAL
Qty: 90 TABLET | Refills: 3 | Status: SHIPPED | OUTPATIENT
Start: 2023-07-15

## 2023-07-17 RX ORDER — ACYCLOVIR 400 MG/1
TABLET ORAL
Qty: 60 TABLET | Refills: 3 | Status: SHIPPED | OUTPATIENT
Start: 2023-07-17 | End: 2023-10-02

## 2023-08-11 DIAGNOSIS — G43.009 MIGRAINE WITHOUT AURA AND WITHOUT STATUS MIGRAINOSUS, NOT INTRACTABLE: ICD-10-CM

## 2023-08-11 DIAGNOSIS — G35 MULTIPLE SCLEROSIS (HCC): Chronic | ICD-10-CM

## 2023-08-14 RX ORDER — ZOLMITRIPTAN 5 MG/1
SPRAY NASAL
Qty: 6 EACH | Refills: 11 | Status: SHIPPED | OUTPATIENT
Start: 2023-08-14 | End: 2023-08-16 | Stop reason: SDUPTHER

## 2023-08-14 RX ORDER — MODAFINIL 200 MG/1
TABLET ORAL
Qty: 90 TABLET | Refills: 1 | Status: SHIPPED | OUTPATIENT
Start: 2023-08-14 | End: 2023-08-16 | Stop reason: SDUPTHER

## 2023-08-14 NOTE — TELEPHONE ENCOUNTER
Received request via: Pharmacy    Was the patient seen in the last year in this department? No    Does the patient have an active prescription (recently filled or refills available) for medication(s) requested? Yes.     Does the patient have alf Plus and need 100 day supply (blood pressure, diabetes and cholesterol meds only)? Medication is not for cholesterol, blood pressure or diabetes    PATIENT HAS AN APPOINTMENT IN CLINIC ON 09/27/23

## 2023-08-15 ENCOUNTER — TELEPHONE (OUTPATIENT)
Dept: NEUROLOGY | Facility: MEDICAL CENTER | Age: 53
End: 2023-08-15
Payer: COMMERCIAL

## 2023-09-27 ENCOUNTER — OFFICE VISIT (OUTPATIENT)
Dept: NEUROLOGY | Facility: MEDICAL CENTER | Age: 53
End: 2023-09-27
Attending: PSYCHIATRY & NEUROLOGY
Payer: COMMERCIAL

## 2023-09-27 VITALS
WEIGHT: 143.74 LBS | DIASTOLIC BLOOD PRESSURE: 72 MMHG | BODY MASS INDEX: 24.54 KG/M2 | TEMPERATURE: 99.9 F | HEIGHT: 64 IN | OXYGEN SATURATION: 99 % | SYSTOLIC BLOOD PRESSURE: 110 MMHG | HEART RATE: 81 BPM

## 2023-09-27 DIAGNOSIS — G43.009 MIGRAINE WITHOUT AURA AND WITHOUT STATUS MIGRAINOSUS, NOT INTRACTABLE: ICD-10-CM

## 2023-09-27 DIAGNOSIS — G35 MS (MULTIPLE SCLEROSIS) (HCC): Primary | ICD-10-CM

## 2023-09-27 PROCEDURE — 99215 OFFICE O/P EST HI 40 MIN: CPT | Performed by: PSYCHIATRY & NEUROLOGY

## 2023-09-27 PROCEDURE — 99212 OFFICE O/P EST SF 10 MIN: CPT | Performed by: PSYCHIATRY & NEUROLOGY

## 2023-09-27 PROCEDURE — 3074F SYST BP LT 130 MM HG: CPT | Performed by: PSYCHIATRY & NEUROLOGY

## 2023-09-27 PROCEDURE — 3078F DIAST BP <80 MM HG: CPT | Performed by: PSYCHIATRY & NEUROLOGY

## 2023-09-27 RX ORDER — TERIFLUNOMIDE 14 MG/1
1 TABLET, FILM COATED ORAL
Qty: 30 TABLET | Refills: 11 | Status: SHIPPED | OUTPATIENT
Start: 2023-09-27 | End: 2023-10-06 | Stop reason: SDUPTHER

## 2023-09-27 ASSESSMENT — ENCOUNTER SYMPTOMS
MEMORY LOSS: 0
SENSORY CHANGE: 0
WEAKNESS: 0
FOCAL WEAKNESS: 0
SPEECH CHANGE: 0
NECK PAIN: 0

## 2023-09-27 ASSESSMENT — FIBROSIS 4 INDEX: FIB4 SCORE: 0.59

## 2023-09-27 ASSESSMENT — PATIENT HEALTH QUESTIONNAIRE - PHQ9: CLINICAL INTERPRETATION OF PHQ2 SCORE: 0

## 2023-09-27 NOTE — PROGRESS NOTES
Subjective     Essie Montalvo is a 53 y.o. female who presents with her  Hemanth, as always, for follow-up, with a history of MS and migraine without aura.     HPI    MS: Essie states that her disease actually has been doing well, she denies any episodes of sudden change suggestive of relapse.  She has been on Aubagio 14 mg daily for quite some time, her last CBC and CMP from April of this year was remarkable still for mild leukopenia of 3.9, but ALC was 1.86.  H/H and platelet counts normal.  CMP showed no liver abnormalities, lipid profile slight elevation of total cholesterol and LDL fraction.    In April of this year, there were several days when she was off the Provigil because of expiration of the prescription itself and difficulty getting renewal from this office.  At that time her allergies also suddenly took off, and since then she had real problems with fatigue.  She did get the Provigil, but found herself not responding as noticeably when she was back on the drug.  She began to play with its use depending on the degree of fatigue.  She also cut back on her daily activities at [employer] and her school.  This seems to have helped to a degree.  She is now not convinced that the Provigil really is helping her, though she has not taken it on a routine and scheduled basis for a while.    She also has noted the constrictive sensation just under the bra line occurring a bit more frequently.  It is not painful, she is simply more aware of it towards the end of the day.  She denies Lhermitte's phenomena.    Her disease otherwise has been symptomatically stable, she has no new problems with cognitive impairment, visual loss, slurred speech, difficulty swallowing, focal motor or sensory loss, change in bowel or bladder control, or imbalance.    MRI of the brain and thoracic spine from May, 2022, were stable with no increasing burden of disease, active lesions, etc.    Migraine without aura: Headaches have  "been stable, over the summer there has been an interval of time where for several weeks she had no headache whatsoever.  Zomig nasal spray still works most consistently, far better than rizatriptan.  About 50% of the time she will use the Zomig after rizatriptan has provided partial benefit only.    Medical, surgical and family histories are reviewed, there are no new drug allergies.  She is on Aubagio 14 mg daily, Maxalt 10 mg and Zomig 5 mg nasal spray as needed, Pyridium 200 mg 3 times daily as needed, Provigil 100 mg twice daily as needed, Estrace, Elavil, Zovirax, and the rest as per the electronic health record, noncontributory from my standpoint.    Review of Systems   Constitutional:  Positive for malaise/fatigue.   Musculoskeletal:  Negative for neck pain.   Neurological:  Negative for sensory change, speech change, focal weakness and weakness.   Psychiatric/Behavioral:  Negative for memory loss.    All other systems reviewed and are negative.    Objective     /72 (BP Location: Right arm, Patient Position: Sitting, BP Cuff Size: Adult)   Pulse 81   Temp 37.7 °C (99.9 °F) (Temporal)   Ht 1.626 m (5' 4\")   Wt 65.2 kg (143 lb 11.8 oz)   LMP 04/25/2018   SpO2 99%   BMI 24.67 kg/m²      Physical Exam    She appears in no acute distress.  Vital signs are stable.  There is no malar rash, temporal or jaw tenderness, or jaw claudication.  The neck is supple, range of motion is full, Lhermitte's phenomena is absent.  Cardiac evaluation reveals a regular rhythm.     Neurological Exam    Fully oriented, there is no aphasia or inattention.    PERRLA/EOMI, to movement detection, the visual fields are full on confrontation.  Facial movements are symmetric, sensory exam is intact to temperature and light touch bilaterally.  The tongue and uvula are midline, jaw movements are intact, jaw jerk is absent.  Shoulder shrug and head rotation are normal.    Musculoskeletal exam reveals normal tone bilaterally, there " is no tremor, asterixis, or drift.  Strength is 5/5 throughout.    Reflexes are present without asymmetries, none are dropped, they are brisk bilaterally.  The toes are downgoing.    She stands easily, as always gait is normal and station and stride length, armswing is symmetric.  Toe and tandem walking are normal.  There is no appendicular dystaxia.  Repetitive movements are symmetric and normal in all 4 extremities.    Sensory exam is intact to vibration, temperature, there is no spinal cord sensory level to vibration or temperature.    Assessment & Plan     1. MS (multiple sclerosis) (HCC)  We will stay the course for now, I will follow-up MRI imaging of the brain and thoracic spine in May of next year.  As per the fatigue, at a reduced level of activity, the Provigil may not be necessary and thus she did not notice much of a difference whether on the drug or off.  She was told to maintain being on the drug, and then off the drug, for 2-week intervals.  If there is no difference, still off the drug, I recommend getting back to an increased level of activity that she would like to maintain.  If her fatigue noticeably worsens in this circumstance, she would see that the medication does provide benefit in which case she needs to stay on it.    As for the increasing fatigue over a longer interval of time this last spring and summer, this may have been due to the allergies themselves having gotten worse, kicking up her immune system a notch.  I doubt that this is related to actual disease activity, her examination has not changed, subjectively she feels that things are getting better.    She will continue to check CBC and CMP through Dr. Roque's office.  So far her numbers are stable, even though the overall WBC count is a little low.  The ALC is acceptable.  She will stay on the Aubagio, obviously.  We will follow-up in June or July of next year.    - Teriflunomide (AUBAGIO) 14 MG Tab; Take 1 Tablet by mouth every  day.  Dispense: 30 Tablet; Refill: 11  - MR-BRAIN-WITH & W/O; Future  - MR-THORACIC SPINE-WITH & W/O; Future    2. Migraine without aura and without status migrainosus, not intractable  Stable, I recommended using only the Zomig nasal spray since it is  consistently more effective than rizatriptan.  She was also told she can use a second spray within an hour if needed, certainly over a longer interval if needed.  She was not aware that she can repeat its use within 24 hours.    Time: 40 minutes in total spent in patient care including pre-charting, record review, discussion with healthcare staff and documentation.  This includes face-to-face time for exam, review, discussion, as well as counseling and coordinating care.

## 2023-10-02 RX ORDER — ACYCLOVIR 400 MG/1
TABLET ORAL
Qty: 120 TABLET | Refills: 1 | Status: SHIPPED | OUTPATIENT
Start: 2023-10-02 | End: 2023-10-30

## 2023-10-06 DIAGNOSIS — G35 MS (MULTIPLE SCLEROSIS) (HCC): ICD-10-CM

## 2023-10-06 RX ORDER — TERIFLUNOMIDE 14 MG/1
1 TABLET, FILM COATED ORAL
Qty: 30 TABLET | Refills: 11 | Status: SHIPPED | OUTPATIENT
Start: 2023-10-06 | End: 2024-01-16 | Stop reason: SDUPTHER

## 2023-10-06 NOTE — TELEPHONE ENCOUNTER
Received request via: Pharmacy    Was the patient seen in the last year in this department? Yes, pts last appt was 9/27/2023    Does the patient have an active prescription (recently filled or refills available) for medication(s) requested? No    Does the patient have halfway Plus and need 100 day supply (blood pressure, diabetes and cholesterol meds only)? Medication is not for cholesterol, blood pressure or diabetes

## 2023-10-30 ENCOUNTER — APPOINTMENT (OUTPATIENT)
Dept: MEDICAL GROUP | Facility: PHYSICIAN GROUP | Age: 53
End: 2023-10-30
Payer: COMMERCIAL

## 2023-11-10 ENCOUNTER — HOSPITAL ENCOUNTER (OUTPATIENT)
Dept: RADIOLOGY | Facility: MEDICAL CENTER | Age: 53
End: 2023-11-10
Attending: PHYSICIAN ASSISTANT
Payer: COMMERCIAL

## 2023-11-10 ENCOUNTER — OFFICE VISIT (OUTPATIENT)
Dept: URGENT CARE | Facility: PHYSICIAN GROUP | Age: 53
End: 2023-11-10
Payer: COMMERCIAL

## 2023-11-10 VITALS
BODY MASS INDEX: 24.41 KG/M2 | DIASTOLIC BLOOD PRESSURE: 70 MMHG | WEIGHT: 143 LBS | SYSTOLIC BLOOD PRESSURE: 118 MMHG | HEIGHT: 64 IN | HEART RATE: 97 BPM | RESPIRATION RATE: 14 BRPM | TEMPERATURE: 98.3 F | OXYGEN SATURATION: 94 %

## 2023-11-10 DIAGNOSIS — J98.8 RESPIRATORY TRACT INFECTION: ICD-10-CM

## 2023-11-10 DIAGNOSIS — R05.1 ACUTE COUGH: ICD-10-CM

## 2023-11-10 PROCEDURE — 3074F SYST BP LT 130 MM HG: CPT | Performed by: PHYSICIAN ASSISTANT

## 2023-11-10 PROCEDURE — 99213 OFFICE O/P EST LOW 20 MIN: CPT | Performed by: PHYSICIAN ASSISTANT

## 2023-11-10 PROCEDURE — 3078F DIAST BP <80 MM HG: CPT | Performed by: PHYSICIAN ASSISTANT

## 2023-11-10 PROCEDURE — 71046 X-RAY EXAM CHEST 2 VIEWS: CPT

## 2023-11-10 RX ORDER — METHYLPREDNISOLONE 4 MG/1
TABLET ORAL
Qty: 21 TABLET | Refills: 0 | Status: SHIPPED | OUTPATIENT
Start: 2023-11-10

## 2023-11-10 RX ORDER — DOXYCYCLINE HYCLATE 100 MG
100 TABLET ORAL 2 TIMES DAILY
Qty: 14 TABLET | Refills: 0 | Status: SHIPPED | OUTPATIENT
Start: 2023-11-10 | End: 2023-11-17

## 2023-11-10 RX ORDER — ALBUTEROL SULFATE 2.5 MG/3ML
2.5 SOLUTION RESPIRATORY (INHALATION) EVERY 4 HOURS PRN
Qty: 30 EACH | Refills: 0 | Status: SHIPPED | OUTPATIENT
Start: 2023-11-10 | End: 2023-11-27 | Stop reason: SDUPTHER

## 2023-11-10 ASSESSMENT — ENCOUNTER SYMPTOMS
DIARRHEA: 0
EYE DISCHARGE: 0
COUGH: 1
SORE THROAT: 0
SHORTNESS OF BREATH: 0
EYE REDNESS: 0
FEVER: 0
WHEEZING: 0
NAUSEA: 0
VOMITING: 0
HEADACHES: 1

## 2023-11-10 ASSESSMENT — FIBROSIS 4 INDEX: FIB4 SCORE: 0.59

## 2023-11-10 NOTE — PROGRESS NOTES
Subjective     Ct Montalvo is a 53 y.o. female who presents with Other (Had covid 3 weeks ago, can't get rid of chest tightness, cough, congestion/)            Cough  This is a new problem. Episode onset: x 3 weeks ago. The problem has been unchanged. The cough is Non-productive. Associated symptoms include chest pain (The patient reports associated chest tightness.), headaches and nasal congestion. Pertinent negatives include no ear pain, eye redness, fever, sore throat, shortness of breath or wheezing. Treatments tried: The patient has tried using her nebulizer for her current symptoms, but states her albuterol solution has .     The patient states she was recently diagnosed with COVID-19 on 10/21/2023.  The patient states over the past 3 weeks she has continued to experience respiratory symptoms with associated chest tightness and a dry cough.    The patient reports a history of bronchitis.    PMH:  has a past medical history of GERD (gastroesophageal reflux disease), Hyperlipidemia (2019), Kidney disease, Muscle disorder, and Thyroid disease.    She has no past medical history of Anxiety, Arrhythmia, Asthma, Blood transfusion without reported diagnosis, Cancer (HCC), CHF (congestive heart failure) (HCC), Clotting disorder (HCC), COPD (chronic obstructive pulmonary disease) (HCC), Depression, Diabetes (HCC), Diabetic neuropathy (HCC), Goiter, Heart attack (HCC), Heart murmur, Hypertension, Osteoporosis, Pulmonary emphysema (HCC), Seizure (HCC), Stroke (HCC), Substance abuse (HCC), or Tuberculosis.  MEDS:   Current Outpatient Medications:     acyclovir (ZOVIRAX) 400 MG tablet, TAKE 2 TABLETS BY MOUTH TWICE A DAY FOR 5 DAYS, Disp: 60 Tablet, Rfl: 3    Teriflunomide (AUBAGIO) 14 MG Tab, Take 1 Tablet by mouth every day., Disp: 30 Tablet, Rfl: 11    pentosan (ELMIRON) 100 MG Cap, TAKE 1 CAPSULE BY MOUTH 3 TIMES A DAY BEFORE MEALS, Disp: 270 Capsule, Rfl: 3    zolmitriptan (ZOMIG) 5 MG nasal  solution, INSTILL 1 SPRAY IN NOSE ONCE A DAY AS NEEDED FOR HEADACHE FOR UP TO 1 DOSE, Disp: 6 Each, Rfl: 11    modafinil (PROVIGIL) 200 MG Tab, TAKE 1 TABLET BY MOUTH EVERY DAY FOR 30 DAYS. G35, Disp: 90 Tablet, Rfl: 1    montelukast (SINGULAIR) 10 MG Tab, TAKE 1 TABLET BY MOUTH IN THE EVENING, Disp: 90 Tablet, Rfl: 3    mometasone (NASONEX) 50 MCG/ACT nasal spray, SPRAY 2 SPRAYS INTO EACH NOSTRIL TWICE A DAY, Disp: 17 g, Rfl: 3    rizatriptan (MAXALT) 10 MG tablet, TAKE 1 TABLET BY MOUTH AT HEADACHE ONSET REPEAT EVERY 1-2 HOURS AS NEEDED UP TO #3 TAB/24 HOUR, Disp: 10 Tablet, Rfl: 5    estradiol (ESTRACE) 0.1 MG/GM vaginal cream, INSERT 1 GRAM VAGINALLY TWO TIMES A WEEK 30 DAYS, Disp: 42.5 g, Rfl: 3    amitriptyline (ELAVIL) 25 MG Tab, Take 1 Tablet by mouth at bedtime., Disp: 90 Tablet, Rfl: 3    phenazopyridine (PYRIDIUM) 200 MG Tab, Take 1 Tablet by mouth 3 times a day as needed for Moderate Pain., Disp: 30 Tablet, Rfl: 2    Nutritional Supplements (JUICE PLUS FIBRE PO), Take  by mouth., Disp: , Rfl:     vitamin D (CHOLECALCIFEROL) 1000 UNIT Tab, Take  by mouth every day., Disp: , Rfl:     cetirizine (ZYRTEC) 10 MG Tab, Take 10 mg by mouth every day. Half daily, Disp: , Rfl:   ALLERGIES:   Allergies   Allergen Reactions    Sulfa Drugs Unspecified     Internal burning sensation     SURGHX: History reviewed. No pertinent surgical history.  SOCHX:  reports that she has never smoked. She has never used smokeless tobacco. She reports current alcohol use. She reports that she does not use drugs.  FH: Family history was reviewed, no pertinent findings to report      Review of Systems   Constitutional:  Negative for fever.   HENT:  Negative for congestion, ear pain and sore throat.    Eyes:  Negative for discharge and redness.   Respiratory:  Positive for cough. Negative for shortness of breath and wheezing.    Cardiovascular:  Positive for chest pain (The patient reports associated chest tightness.). Negative for leg  "swelling.   Gastrointestinal:  Negative for diarrhea, nausea and vomiting.   Neurological:  Positive for headaches.              Objective     /70   Pulse 97   Temp 36.8 °C (98.3 °F) (Temporal)   Resp 14   Ht 1.626 m (5' 4\")   Wt 64.9 kg (143 lb)   LMP 04/25/2018   SpO2 94%   BMI 24.55 kg/m²      Physical Exam  Constitutional:       General: She is not in acute distress.     Appearance: Normal appearance. She is not ill-appearing.   HENT:      Head: Normocephalic and atraumatic.      Right Ear: Tympanic membrane, ear canal and external ear normal.      Left Ear: Tympanic membrane, ear canal and external ear normal.      Nose: Nose normal.      Mouth/Throat:      Mouth: Mucous membranes are moist.      Pharynx: Oropharynx is clear. No posterior oropharyngeal erythema.   Eyes:      Extraocular Movements: Extraocular movements intact.      Conjunctiva/sclera: Conjunctivae normal.   Cardiovascular:      Rate and Rhythm: Normal rate and regular rhythm.      Heart sounds: Normal heart sounds.   Pulmonary:      Effort: Pulmonary effort is normal. No respiratory distress.      Breath sounds: Rales (bilateral bases) present. No wheezing.   Musculoskeletal:         General: Normal range of motion.      Cervical back: Normal range of motion and neck supple.   Skin:     General: Skin is warm and dry.   Neurological:      Mental Status: She is alert and oriented to person, place, and time.               Progress:  CXR:   COMPARISON:  None available.     FINDINGS:  The mediastinal and cardiac silhouette is unremarkable.     The pulmonary vascularity is within normal limits.     The lung parenchyma is clear.     There is no significant pleural effusion.     There is no visible pneumothorax.     There are no acute bony abnormalities.     IMPRESSION:  1.  Unremarkable two view chest.              Assessment & Plan        1. Respiratory tract infection  - doxycycline (VIBRAMYCIN) 100 MG Tab; Take 1 Tablet by mouth 2 " times a day for 7 days.  Dispense: 14 Tablet; Refill: 0  - methylPREDNISolone (MEDROL DOSEPAK) 4 MG Tablet Therapy Pack; Follow schedule on package instructions.  Dispense: 21 Tablet; Refill: 0    2. Acute cough  - DX-CHEST-2 VIEWS; Future  - albuterol (PROVENTIL) 2.5mg/3ml Nebu Soln solution for nebulization; Take 3 mL by nebulization every four hours as needed for Shortness of Breath.  Dispense: 30 Each; Refill: 0    The patient's presenting symptoms and physical exam findings are consistent with an acute respiratory tract infection with an associated cough.  The patient was recently diagnosed with COVID-19 x3 weeks ago, and continues to experience respiratory symptoms.  On physical exam, the patient had mild rales to the bilateral bases without wheezing or rhonchi.  The patient's pulse ox is within normal limits.  The patient is nontoxic and appears in no acute distress.  The patient's vital signs are stable and within normal limits.  She is afebrile today in clinic.  A chest x-ray was obtained to further evaluate the patient's current symptoms.  The patient's chest x-ray today in clinic was unremarkable.  Based on the patient's presenting symptoms and physical exam findings, will prescribe the patient doxycycline to cover for a presumed respiratory tract infection.  We will also prescribe the patient a Medrol Dosepak for her current symptoms.  Additionally, we will refill the patient's butyryl nebulizer solution at this time.  Advised the patient to monitor for worsening signs or symptoms.  Recommend OTC medications and supportive care for symptomatic management.  Recommend the patient follow-up with her PCP as needed.  Discussion precautions with the patient, and she verbalized understanding.    Differential diagnoses, supportive care, and indications for immediate follow-up discussed with patient.   Instructed to return to clinic or nearest emergency department for any change in condition, further concerns, or  worsening of symptoms.    OTC Tylenol or Motrin for fever/discomfort.  OTC cough/cold medication for symptomatic relief  OTC Supportive Care for Congestion - saline nasal spray or neti pot  Drink plenty of fluids  Follow-up with PCP  Return to clinic or go to the ED if symptoms worsen or fail to improve, or if the patient should develop worsening/increasing cough, congestion, ear pain, sore throat, shortness of breath, wheezing, chest pain, fever/chills, and/or any concerning symptoms.    Discussed plan with the patient, and she agrees to the above.     I personally reviewed prior external notes and test results pertinent to today's visit.  I have independently reviewed and interpreted all diagnostics ordered during this urgent care visit.     Please note that this dictation was created using voice recognition software. I have made every reasonable attempt to correct obvious errors, but I expect that there may be errors of grammar and possibly content that I did not discover before finalizing the note.     This note was electronically signed by Dasha Gray PA-C

## 2023-11-27 ENCOUNTER — PATIENT MESSAGE (OUTPATIENT)
Dept: MEDICAL GROUP | Facility: PHYSICIAN GROUP | Age: 53
End: 2023-11-27
Payer: COMMERCIAL

## 2023-11-27 DIAGNOSIS — R05.1 ACUTE COUGH: ICD-10-CM

## 2023-11-27 RX ORDER — ALBUTEROL SULFATE 2.5 MG/3ML
2.5 SOLUTION RESPIRATORY (INHALATION) EVERY 4 HOURS PRN
Qty: 30 EACH | Refills: 0 | Status: SHIPPED | OUTPATIENT
Start: 2023-11-27 | End: 2023-11-29 | Stop reason: SDUPTHER

## 2023-11-27 RX ORDER — AMITRIPTYLINE HYDROCHLORIDE 25 MG/1
25 TABLET, FILM COATED ORAL
Qty: 90 TABLET | Refills: 3 | Status: SHIPPED | OUTPATIENT
Start: 2023-11-27

## 2023-11-28 ENCOUNTER — PATIENT MESSAGE (OUTPATIENT)
Dept: MEDICAL GROUP | Facility: PHYSICIAN GROUP | Age: 53
End: 2023-11-28
Payer: COMMERCIAL

## 2023-11-28 DIAGNOSIS — R05.1 ACUTE COUGH: ICD-10-CM

## 2023-11-30 DIAGNOSIS — G43.009 MIGRAINE WITHOUT AURA AND WITHOUT STATUS MIGRAINOSUS, NOT INTRACTABLE: ICD-10-CM

## 2023-11-30 RX ORDER — RIZATRIPTAN BENZOATE 10 MG/1
TABLET ORAL
Qty: 10 TABLET | Refills: 11 | Status: SHIPPED | OUTPATIENT
Start: 2023-11-30

## 2023-11-30 RX ORDER — ALBUTEROL SULFATE 2.5 MG/3ML
2.5 SOLUTION RESPIRATORY (INHALATION) EVERY 4 HOURS PRN
Qty: 30 EACH | Refills: 2 | Status: SHIPPED | OUTPATIENT
Start: 2023-11-30

## 2023-11-30 NOTE — TELEPHONE ENCOUNTER
Received request via: Pharmacy    Was the patient seen in the last year in this department? Yes   Date of last office visit 9/27/23     Per last Neurology Office Visit, when was the date of next follow up visit set for? 1 yr                           Date of office visit follow up request 9/27/24     Does the patient have an upcoming appointment? Yes   If yes, when? 6/14/24    Does the patient have an active prescription (recently filled or refills available) for medication(s) requested? No    Does the patient have [facility] and need 100 day supply (blood pressure, diabetes and cholesterol meds only)? Medication is not for cholesterol, blood pressure or diabetes

## 2023-12-01 ENCOUNTER — OFFICE VISIT (OUTPATIENT)
Dept: URGENT CARE | Facility: PHYSICIAN GROUP | Age: 53
End: 2023-12-01
Payer: COMMERCIAL

## 2023-12-01 ENCOUNTER — HOSPITAL ENCOUNTER (OUTPATIENT)
Dept: RADIOLOGY | Facility: MEDICAL CENTER | Age: 53
End: 2023-12-01
Attending: INTERNAL MEDICINE
Payer: COMMERCIAL

## 2023-12-01 VITALS
WEIGHT: 143 LBS | SYSTOLIC BLOOD PRESSURE: 116 MMHG | OXYGEN SATURATION: 98 % | HEART RATE: 89 BPM | HEIGHT: 64 IN | BODY MASS INDEX: 24.41 KG/M2 | DIASTOLIC BLOOD PRESSURE: 78 MMHG | TEMPERATURE: 97.3 F | RESPIRATION RATE: 14 BRPM

## 2023-12-01 DIAGNOSIS — J06.9 BACTERIAL UPPER RESPIRATORY INFECTION: ICD-10-CM

## 2023-12-01 DIAGNOSIS — B96.89 BACTERIAL UPPER RESPIRATORY INFECTION: ICD-10-CM

## 2023-12-01 DIAGNOSIS — Z12.31 ENCOUNTER FOR SCREENING MAMMOGRAM FOR MALIGNANT NEOPLASM OF BREAST: ICD-10-CM

## 2023-12-01 PROCEDURE — 99214 OFFICE O/P EST MOD 30 MIN: CPT | Performed by: PHYSICIAN ASSISTANT

## 2023-12-01 PROCEDURE — 3074F SYST BP LT 130 MM HG: CPT | Performed by: PHYSICIAN ASSISTANT

## 2023-12-01 PROCEDURE — 3078F DIAST BP <80 MM HG: CPT | Performed by: PHYSICIAN ASSISTANT

## 2023-12-01 PROCEDURE — 77063 BREAST TOMOSYNTHESIS BI: CPT

## 2023-12-01 RX ORDER — AMOXICILLIN AND CLAVULANATE POTASSIUM 875; 125 MG/1; MG/1
1 TABLET, FILM COATED ORAL 2 TIMES DAILY
Qty: 14 TABLET | Refills: 0 | Status: SHIPPED | OUTPATIENT
Start: 2023-12-01 | End: 2023-12-08

## 2023-12-01 RX ORDER — ALBUTEROL SULFATE 90 UG/1
2 AEROSOL, METERED RESPIRATORY (INHALATION) EVERY 6 HOURS PRN
Qty: 8.5 G | Refills: 0 | Status: SHIPPED | OUTPATIENT
Start: 2023-12-01

## 2023-12-01 ASSESSMENT — FIBROSIS 4 INDEX: FIB4 SCORE: 0.59

## 2023-12-01 ASSESSMENT — ENCOUNTER SYMPTOMS
SHORTNESS OF BREATH: 0
FEVER: 0
PALPITATIONS: 0
SPUTUM PRODUCTION: 1
WHEEZING: 0
COUGH: 1
CHILLS: 0
HEMOPTYSIS: 0
SORE THROAT: 0

## 2023-12-01 NOTE — PROGRESS NOTES
Subjective:   Ct Montalvo is a 53 y.o. female who presents today with   Chief Complaint   Patient presents with    Headache     Head congestion, cough       Cough  This is a new problem. The current episode started in the past 7 days. The problem has been gradually worsening. The problem occurs every few minutes. The cough is Productive of sputum. Associated symptoms include nasal congestion. Pertinent negatives include no chest pain, chills, fever, hemoptysis, sore throat, shortness of breath or wheezing. Treatments tried: Sinus rinses. The treatment provided no relief.     Patient was seen on November 10 and had x-ray and treated with steroid and antibiotic at that time as well as inhaler.  She states her symptoms were improving for couple of days but then started back again this past week with cough that has been congested as well as some sinus pressure.    PMH:  has a past medical history of GERD (gastroesophageal reflux disease), Hyperlipidemia (7/22/2019), Kidney disease, Muscle disorder, and Thyroid disease.    She has no past medical history of Anxiety, Arrhythmia, Asthma, Blood transfusion without reported diagnosis, Cancer ([facility]), CHF (congestive heart failure) ([facility]), Clotting disorder ([facility]), COPD (chronic obstructive pulmonary disease) ([facility]), Depression, Diabetes ([facility]), Diabetic neuropathy ([facility]), Goiter, Heart attack ([facility]), Heart murmur, Hypertension, Osteoporosis, Pulmonary emphysema ([facility]), Seizure ([facility]), Stroke ([facility]), Substance abuse ([facility]), or Tuberculosis.  MEDS:   Current Outpatient Medications:     amoxicillin-clavulanate (AUGMENTIN) 875-125 MG Tab, Take 1 Tablet by mouth 2 times a day for 7 days., Disp: 14 Tablet, Rfl: 0    albuterol 108 (90 Base) MCG/ACT Aero Soln inhalation aerosol, Inhale 2 Puffs every 6 hours as needed for Shortness of Breath., Disp: 8.5 g, Rfl: 0    albuterol (PROVENTIL) 2.5mg/3ml Nebu Soln solution for nebulization, Take 3 mL by nebulization every four hours as  needed for Shortness of Breath., Disp: 30 Each, Rfl: 2    rizatriptan (MAXALT) 10 MG tablet, TAKE 1 TABLET BY MOUTH AT HEADACHE ONSET REPEAT EVERY 1-2 HOURS AS NEEDED UP TO #3 TAB/24 HOUR, Disp: 10 Tablet, Rfl: 11    amitriptyline (ELAVIL) 25 MG Tab, Take 1 Tablet by mouth at bedtime., Disp: 90 Tablet, Rfl: 3    acyclovir (ZOVIRAX) 400 MG tablet, TAKE 2 TABLETS BY MOUTH TWICE A DAY FOR 5 DAYS, Disp: 60 Tablet, Rfl: 3    Teriflunomide (AUBAGIO) 14 MG Tab, Take 1 Tablet by mouth every day., Disp: 30 Tablet, Rfl: 11    pentosan (ELMIRON) 100 MG Cap, TAKE 1 CAPSULE BY MOUTH 3 TIMES A DAY BEFORE MEALS, Disp: 270 Capsule, Rfl: 3    zolmitriptan (ZOMIG) 5 MG nasal solution, INSTILL 1 SPRAY IN NOSE ONCE A DAY AS NEEDED FOR HEADACHE FOR UP TO 1 DOSE, Disp: 6 Each, Rfl: 11    montelukast (SINGULAIR) 10 MG Tab, TAKE 1 TABLET BY MOUTH IN THE EVENING, Disp: 90 Tablet, Rfl: 3    mometasone (NASONEX) 50 MCG/ACT nasal spray, SPRAY 2 SPRAYS INTO EACH NOSTRIL TWICE A DAY, Disp: 17 g, Rfl: 3    estradiol (ESTRACE) 0.1 MG/GM vaginal cream, INSERT 1 GRAM VAGINALLY TWO TIMES A WEEK 30 DAYS, Disp: 42.5 g, Rfl: 3    phenazopyridine (PYRIDIUM) 200 MG Tab, Take 1 Tablet by mouth 3 times a day as needed for Moderate Pain., Disp: 30 Tablet, Rfl: 2    Nutritional Supplements (JUICE PLUS FIBRE PO), Take  by mouth., Disp: , Rfl:     vitamin D (CHOLECALCIFEROL) 1000 UNIT Tab, Take  by mouth every day., Disp: , Rfl:     cetirizine (ZYRTEC) 10 MG Tab, Take 10 mg by mouth every day. Half daily, Disp: , Rfl:     methylPREDNISolone (MEDROL DOSEPAK) 4 MG Tablet Therapy Pack, Follow schedule on package instructions., Disp: 21 Tablet, Rfl: 0  ALLERGIES:   Allergies   Allergen Reactions    Sulfa Drugs Unspecified     Internal burning sensation     SURGHX: No past surgical history on file.  SOCHX:  reports that she has never smoked. She has never used smokeless tobacco. She reports current alcohol use. She reports that she does not use drugs.  FH: Reviewed  "with patient, not pertinent to this visit.     Review of Systems   Constitutional:  Negative for chills and fever.   HENT:  Negative for sore throat.    Respiratory:  Positive for cough and sputum production. Negative for hemoptysis, shortness of breath and wheezing.    Cardiovascular:  Negative for chest pain and palpitations.      Objective:   /78   Pulse 89   Temp 36.3 °C (97.3 °F) (Temporal)   Resp 14   Ht 1.626 m (5' 4\")   Wt 64.9 kg (143 lb)   LMP 04/25/2018   SpO2 98%   BMI 24.55 kg/m²   Physical Exam  Vitals and nursing note reviewed.   Constitutional:       General: She is not in acute distress.     Appearance: Normal appearance. She is well-developed. She is not ill-appearing or toxic-appearing.   HENT:      Head: Normocephalic and atraumatic.      Right Ear: Hearing normal.      Left Ear: Hearing normal.      Nose: Congestion present.      Mouth/Throat:      Mouth: Mucous membranes are moist.      Pharynx: No oropharyngeal exudate or posterior oropharyngeal erythema.   Cardiovascular:      Rate and Rhythm: Normal rate and regular rhythm.      Heart sounds: Normal heart sounds.   Pulmonary:      Effort: Pulmonary effort is normal. No respiratory distress.      Breath sounds: Normal breath sounds. No stridor. No wheezing, rhonchi or rales.   Musculoskeletal:      Comments: Normal movement in all 4 extremities   Skin:     General: Skin is warm and dry.   Neurological:      Mental Status: She is alert.      Coordination: Coordination normal.   Psychiatric:         Mood and Affect: Mood normal.         Assessment/Plan:   Assessment    1. Bacterial upper respiratory infection  - amoxicillin-clavulanate (AUGMENTIN) 875-125 MG Tab; Take 1 Tablet by mouth 2 times a day for 7 days.  Dispense: 14 Tablet; Refill: 0  - albuterol 108 (90 Base) MCG/ACT Aero Soln inhalation aerosol; Inhale 2 Puffs every 6 hours as needed for Shortness of Breath.  Dispense: 8.5 g; Refill: 0    Symptoms and presentation " consistent with respiratory tract infection and given duration and progression of symptoms over the past couple of weeks I recommend treating for potential bacterial etiology.  Also could have symptoms related with initial COVID she had in October causing subacute cough.  Recommend continue with nebulizer as previously prescribed and will also send over inhaler to the pharmacy.  No indication for repeat x-ray at this time.    Differential diagnosis, natural history, supportive care, and indications for immediate follow-up discussed.   Patient given instructions and understanding of medications and treatment.    If not improving in 3-5 days, F/U with PCP or return to  if symptoms worsen.    Patient agreeable to plan.      Please note that this dictation was created using voice recognition software. I have made every reasonable attempt to correct obvious errors, but I expect that there are errors of grammar and possibly content that I did not discover before finalizing the note.    Maksim Schilling PA-C

## 2023-12-08 RX ORDER — ACYCLOVIR 400 MG/1
TABLET ORAL
Qty: 360 TABLET | Refills: 1 | Status: SHIPPED | OUTPATIENT
Start: 2023-12-08

## 2024-01-16 DIAGNOSIS — G35 MS (MULTIPLE SCLEROSIS) (HCC): ICD-10-CM

## 2024-01-16 RX ORDER — TERIFLUNOMIDE 14 MG/1
1 TABLET, FILM COATED ORAL
Qty: 30 TABLET | Refills: 11 | Status: SHIPPED | OUTPATIENT
Start: 2024-01-16

## 2024-01-17 ENCOUNTER — TELEPHONE (OUTPATIENT)
Dept: PHARMACY | Facility: MEDICAL CENTER | Age: 54
End: 2024-01-17
Payer: COMMERCIAL

## 2024-01-17 NOTE — TELEPHONE ENCOUNTER
Received Refill PA request via [facility]  for TERIFLUNOMIDE (AUBAGIO) 14 MG TAB. (Quantity:30, Day Supply:30)     Insurance: [employer]  Member ID:  [id number]  BIN: [id number]  PCN: [id number]  Group: [id number]     Ran Test claim via [place] [facility] medication  [facility] NOT CONTRACTED//SUBMITTED PA THRU CMM- Available without authorization

## 2024-02-15 ENCOUNTER — NON-PROVIDER VISIT (OUTPATIENT)
Dept: MEDICAL GROUP | Facility: PHYSICIAN GROUP | Age: 54
End: 2024-02-15
Payer: COMMERCIAL

## 2024-02-15 DIAGNOSIS — Z23 NEED FOR VACCINATION: ICD-10-CM

## 2024-02-15 PROCEDURE — 90471 IMMUNIZATION ADMIN: CPT | Performed by: INTERNAL MEDICINE

## 2024-02-15 PROCEDURE — 90746 HEPB VACCINE 3 DOSE ADULT IM: CPT | Performed by: INTERNAL MEDICINE

## 2024-02-16 NOTE — PROGRESS NOTES
"Ct Montalvo is a 53 y.o. female here for a non-provider visit for:   HEPATITIS B 3 of 3    Reason for immunization: continue or complete series started at the office  Immunization records indicate need for vaccine: Yes, confirmed with Epic  Minimum interval has been met for this vaccine: Yes  ABN completed: Yes    VIS Dated  7/24/23 was given to patient: Yes  All IAC Questionnaire questions were answered \"No.\"    Patient tolerated injection and no adverse effects were observed or reported: Yes    Pt scheduled for next dose in series: No  "

## 2024-02-23 DIAGNOSIS — G35 MULTIPLE SCLEROSIS (HCC): Chronic | ICD-10-CM

## 2024-02-26 ENCOUNTER — TELEPHONE (OUTPATIENT)
Dept: PHARMACY | Facility: MEDICAL CENTER | Age: 54
End: 2024-02-26
Payer: COMMERCIAL

## 2024-02-26 RX ORDER — MODAFINIL 200 MG/1
TABLET ORAL
Qty: 30 TABLET | Refills: 5 | Status: SHIPPED | OUTPATIENT
Start: 2024-02-26 | End: 2024-08-25

## 2024-02-26 NOTE — TELEPHONE ENCOUNTER
Received Refill PA request via [facility]  for MODAFINIL (PROVIGIL) 200 MG TABLET. (Quantity:30, Day Supply:30)     Insurance: [employer]  Member ID:  [id number]  BIN: [id number]  PCN: [id number]  Group: [id number]     Ran Test claim via [place] [facility] medication  CONNECTION TO PAYOR IS DOWN//CAN NOT RUN TEST CLAIM SUBMITTED PA THRU CMM  Key: [id number]// CAME BACK WITH AVAILABLE WITHOUT PRIOR AUTH.

## 2024-02-26 NOTE — TELEPHONE ENCOUNTER
Received request via: Pharmacy    Medication Name/Dosage MODAFINIL 200 MG TABLET     When was medication last prescribed 08/16/23    How many refills were previously provided 1    How many Refills does he patient have left from last prescription 0    Was the patient seen in the last year in this department? Yes   Date of last office visit 09/27/23     Per last Neurology Office Visit, when was the date of next follow up visit set for?                            Date of office visit follow up request : 1yr    Does the patient have an upcoming appointment? Yes   If yes, when 06/24/24             If no, schedule appointment N/A    Does the patient have [facility] and need 100 day supply (blood pressure, diabetes and cholesterol meds only)? Patient does not have [facility]

## 2024-02-27 ENCOUNTER — APPOINTMENT (RX ONLY)
Dept: URBAN - METROPOLITAN AREA CLINIC 22 | Facility: CLINIC | Age: 54
Setting detail: DERMATOLOGY
End: 2024-02-27

## 2024-02-27 DIAGNOSIS — I78.8 OTHER DISEASES OF CAPILLARIES: ICD-10-CM

## 2024-02-27 DIAGNOSIS — L82.1 OTHER SEBORRHEIC KERATOSIS: ICD-10-CM

## 2024-02-27 DIAGNOSIS — L81.4 OTHER MELANIN HYPERPIGMENTATION: ICD-10-CM

## 2024-02-27 DIAGNOSIS — D22 MELANOCYTIC NEVI: ICD-10-CM

## 2024-02-27 DIAGNOSIS — Z71.89 OTHER SPECIFIED COUNSELING: ICD-10-CM

## 2024-02-27 PROBLEM — D22.5 MELANOCYTIC NEVI OF TRUNK: Status: ACTIVE | Noted: 2024-02-27

## 2024-02-27 PROCEDURE — 99213 OFFICE O/P EST LOW 20 MIN: CPT

## 2024-02-27 PROCEDURE — ? SUNSCREEN RECOMMENDATIONS

## 2024-02-27 PROCEDURE — ? COUNSELING

## 2024-02-27 ASSESSMENT — LOCATION SIMPLE DESCRIPTION DERM
LOCATION SIMPLE: NOSE
LOCATION SIMPLE: INFERIOR FOREHEAD
LOCATION SIMPLE: RIGHT FOREARM
LOCATION SIMPLE: UPPER BACK
LOCATION SIMPLE: LEFT FOREARM

## 2024-02-27 ASSESSMENT — LOCATION ZONE DERM
LOCATION ZONE: ARM
LOCATION ZONE: FACE
LOCATION ZONE: TRUNK
LOCATION ZONE: NOSE

## 2024-02-27 ASSESSMENT — LOCATION DETAILED DESCRIPTION DERM
LOCATION DETAILED: NASAL ROOT
LOCATION DETAILED: INFERIOR THORACIC SPINE
LOCATION DETAILED: RIGHT VENTRAL PROXIMAL FOREARM
LOCATION DETAILED: SUPERIOR THORACIC SPINE
LOCATION DETAILED: LEFT VENTRAL PROXIMAL FOREARM
LOCATION DETAILED: INFERIOR MID FOREHEAD

## 2024-02-27 NOTE — PROCEDURE: SUNSCREEN RECOMMENDATIONS

## 2024-02-27 NOTE — PROCEDURE: COUNSELING
Detail Level: Generalized
Detail Level: Zone
Patient Specific Counseling (Will Not Stick From Patient To Patient): If patient would like to treat recommend vbeam
Detail Level: Simple

## 2024-03-05 NOTE — TELEPHONE ENCOUNTER
Received request via: Pharmacy    Was the patient seen in the last year in this department? Yes    Does the patient have an active prescription (recently filled or refills available) for medication(s) requested? No    Pharmacy Name: [facility]    Does the patient have [facility] and need 100 day supply (blood pressure, diabetes and cholesterol meds only)? Patient does not have SCP

## 2024-03-06 RX ORDER — MOMETASONE FUROATE 50 UG/1
SPRAY, METERED NASAL
Qty: 17 G | Refills: 0 | Status: SHIPPED | OUTPATIENT
Start: 2024-03-06

## 2024-03-22 NOTE — MR AVS SNAPSHOT
"        Ct Ortize   2017 1:40 PM   Office Visit   MRN: [id number]    Department:  [facility]   Dept Phone:  [phone]    Description:  Female : 1970   Provider:  JUDD Briscoe           Reason for Visit     Annual Exam Annual without PAP       Allergies as of 2017     Allergen Noted Reactions    Sulfa Drugs 2016   Unspecified    Internal burning sensation      You were diagnosed with     Routine general medical examination at a health care facility   [V70.0.ICD-9-CM]       IC (interstitial cystitis)   [678069]       Hypothyroidism due to acquired atrophy of thyroid   [0052931]       Gastroesophageal reflux disease without esophagitis   [728534]       Chronic migraine without aura without status migrainosus, not intractable   [203043]       Screening, lipid   [332093]         Vital Signs     Blood Pressure Pulse Temperature Respirations Height Weight    112/60 mmHg 76 37.3 °C (99.2 °F) 14 1.626 m (5' 4.02\") 62.596 kg (138 lb)    Body Mass Index Oxygen Saturation Last Menstrual Period Breastfeeding? Smoking Status       23.68 kg/m2 99% 2017 No Never Smoker        Basic Information     Date Of Birth Sex Race Ethnicity Preferred Language    1970 Female White Non- English      Your appointments     2017  3:30 PM   MR SPINE WITH AND WITHOUT (60) with DOUBLE R MRI 1   IMAGING DOUBLE R. (Double R)    [address]  [facility] [id number] [phone]            2017  9:20 AM   Follow Up Visit with Jean Marie Shelley M.D.   [facility] (--)    [address], [address]  [facility] [address] [phone]           You will be receiving a confirmation call a few days before your appointment from our automated call confirmation system.              Problem List              ICD-10-CM Priority Class Noted - Resolved    Chronic migraine without aura without status migrainosus, not intractable G43.709   " 6/14/2016 - Present    IC (interstitial cystitis) N30.10   6/14/2016 - Present    Gastroesophageal reflux disease without esophagitis K21.9   6/14/2016 - Present    Hypothyroidism due to acquired atrophy of thyroid E03.4   6/14/2016 - Present    Herpes labialis B00.1   6/15/2016 - Present      Health Maintenance        Date Due Completion Dates    IMM DTaP/Tdap/Td Vaccine (1 - Tdap) 7/26/1989 ---    MAMMOGRAM 11/1/2017 11/1/2016, 10/1/2015 (Done)    Override on 10/1/2015: Done (NL)    PAP SMEAR 10/1/2018 10/1/2015 (Done)    Override on 10/1/2015: Done (Nl, mirian)            Current Immunizations     Influenza Vaccine Adult HD 11/1/2016      Below and/or attached are the medications your provider expects you to take. Review all of your home medications and newly ordered medications with your provider and/or pharmacist. Follow medication instructions as directed by your provider and/or pharmacist. Please keep your medication list with you and share with your provider. Update the information when medications are discontinued, doses are changed, or new medications (including over-the-counter products) are added; and carry medication information at all times in the event of emergency situations     Allergies:  SULFA DRUGS - Unspecified               Medications  Valid as of: June 19, 2017 -  1:49 PM    Generic Name Brand Name Tablet Size Instructions for use    Acyclovir (Tab) ZOVIRAX 400 MG Take 800 mg by mouth every day.        Amitriptyline HCl (Tab) ELAVIL 25 MG Take 25 mg by mouth every evening.        Eletriptan Hydrobromide (Tab) RELPAX 40 MG Take 40 mg by mouth Once PRN.        Levothyroxine Sodium (Tab) SYNTHROID 25 MCG Take 1 Tab by mouth Every morning on an empty stomach.        Mometasone Furoate (Suspension) NASONEX 50 MCG/ACT Spray 2 Sprays in nose 2 Times a Day.        Montelukast Sodium (Tab) SINGULAIR 10 MG Take 1 Tab by mouth every day.        Pentosan Polysulfate Sodium (Cap) ELMIRON 100 MG Take 300  mg by mouth every day. At midnight.  Indications: Bladder Wall Inflammation        PredniSONE   Take  by mouth.        Rizatriptan Benzoate (Tab) MAXALT 10 MG Take 10 mg by mouth Once PRN for Migraine.        ZOLMitriptan (Solution) ZOLMitriptan 2.5 MG Spray 1 Spray in nose 1 time daily as needed.        .                 Medicines prescribed today were sent to:     [facility]/PHARMACY #[id number] - [place], [place] - [address]    [address] [place] [address]    Phone: [phone] Fax: [phone]    Open 24 Hours?: No      Medication refill instructions:       If your prescription bottle indicates you have medication refills left, it is not necessary to call your provider’s office. Please contact your pharmacy and they will refill your medication.    If your prescription bottle indicates you do not have any refills left, you may request refills at any time through one of the following ways: The online [facility] system (except Urgent Care), by calling your provider’s office, or by asking your pharmacy to contact your provider’s office with a refill request. Medication refills are processed only during regular business hours and may not be available until the next business day. Your provider may request additional information or to have a follow-up visit with you prior to refilling your medication.   *Please Note: Medication refills are assigned a new Rx number when refilled electronically. Your pharmacy may indicate that no refills were authorized even though a new prescription for the same medication is available at the pharmacy. Please request the medicine by name with the pharmacy before contacting your provider for a refill.        Your To Do List     Future Labs/Procedures Complete By Expires    LIPID PROFILE  As directed 6/20/2018    URINALYSIS,CULTURE IF INDICATED  As directed 6/20/2018         [facility] Access Code: Activation code not generated  Current [facility] Status: Active           My signature below certifies that the above stated patient is homebound and upon completion of the Face-To-Face encounter, has the need for intermittent skilled nursing, physical therapy and/or speech or occupational therapy services in their home for their current diagnosis as outlined in their initial plan of care. These services will continue to be monitored by myself or another physician.

## 2024-04-16 RX ORDER — ESTRADIOL 0.1 MG/G
CREAM VAGINAL
Qty: 42.5 G | Refills: 0 | Status: SHIPPED | OUTPATIENT
Start: 2024-04-16

## 2024-04-16 NOTE — TELEPHONE ENCOUNTER
Received request via: Pharmacy    Was the patient seen in the last year in this department? Yes    Does the patient have an active prescription (recently filled or refills available) for medication(s) requested? No    Pharmacy Name: [facility]    Does the patient have long-term Plus and need 100 day supply (blood pressure, diabetes and cholesterol meds only)? Patient does not have SCP

## 2024-04-18 DIAGNOSIS — G35 MS (MULTIPLE SCLEROSIS) (HCC): ICD-10-CM

## 2024-04-18 RX ORDER — TERIFLUNOMIDE 7 MG/1
14 TABLET, FILM COATED ORAL DAILY
Qty: 60 TABLET | Refills: 5 | Status: SHIPPED | OUTPATIENT
Start: 2024-04-18

## 2024-04-19 ENCOUNTER — TELEPHONE (OUTPATIENT)
Dept: NEUROLOGY | Facility: MEDICAL CENTER | Age: 54
End: 2024-04-19
Payer: COMMERCIAL

## 2024-04-19 NOTE — TELEPHONE ENCOUNTER
Prior Authorization for Teriflunomide (AUBAGIO) 14 MG Tab  (Quantity: 60, Days: 30) has been submitted via Cover My Meds: Key ([id number])    Insurance: SO    Will follow up in 24-48 business hours.

## 2024-04-25 NOTE — TELEPHONE ENCOUNTER
I called Buddy at [phone] s/w Jina  there is a prior authorization that expires on May 12th 2024. Ran TC thru [employer]ow 60/30 Must Dispense Through Specialty Pharmacy.  Future refills are required to be filled  through [facility]; Restricted to  [facility]. Please contact [facility] @ [phone]. She said it was changed to the 7mg because the 14 mg was unavailable.

## 2024-04-30 NOTE — TELEPHONE ENCOUNTER
Medication Requesting prior authorization for TERIFLUNOMIDE TABLET 7 MG   PA Outcome approved   Quantity of 60 for a day supply of 30   Insurance [facility]   Reference #? [id number]  Dates in effect, from 4/30/2024 through 04/30/2025  Pharmacy and phone number  [facility] [phone]

## 2024-05-05 NOTE — PROGRESS NOTES
Verbal consent was acquired by the patient to use [employer] ambient listening note generation during this visit Yes       Subjective:     CC:   Chief Complaint   Patient presents with    Requesting Labs    Foot Problem     Right foot - patient states she has a spot that is concerning     Medication Management     Acyclovir want to get the medication change to longer          HPI:     History of Present Illness  Ct Montalvo is a 53-year-old female who presents for follow-up visit.  The patient states she would like to have updated lab orders. She states she recently underwent a Pap smear, which yielded negative results. A mammogram has been scheduled for her in 12/2024.  She also needs refills of some of her medications, she states she is not taking hormone replacement therapy.  She is reporting increased fatigue and was uncertain if this was due to menopause or her MS.  Despite feeling normal for several weeks, she suddenly feels overwhelmed and lacks the energy to contemplate tasks. She maintains an active lifestyle and adheres to a gluten-free diet.    The patient reports a callus on the plantar aspect of her foot, which is painful when she walks barefoot, but not when she is wearing shoes. She has not used any over-the-counter treatments for the corn.    The patient reports intermittent numbness in her hand and arms, which disrupts her sleep. She experiences numbness in 3 fingers during yoga or texting activities. She suspects carpal tunnel syndrome, she does not believe it is related to her Multiple Sclerosis (MS). She denies any neck pain.        Past Medical History:   Diagnosis Date    GERD (gastroesophageal reflux disease)     no improvement with omeprazole.  on prevacid    Hyperlipidemia 7/22/2019    Kidney disease     kidney infection as a child, once only, inpatient    Muscle disorder     Thyroid disease      NL. no treatments or procedures       Social History     Tobacco Use    Smoking  status: Never    Smokeless tobacco: Never   Vaping Use    Vaping Use: Never used   Substance Use Topics    Alcohol use: Yes     Comment: social    Drug use: No       Current Outpatient Medications Ordered in Epic   Medication Sig Dispense Refill    acyclovir (ZOVIRAX) 400 MG tablet Take 1 Tablet by mouth 2 times a day. 720 Tablet 0    estradiol (ESTRACE) 0.1 MG/GM vaginal cream INSERT 1 GRAM VAGINALLY TWO TIMES A WEEK 30 DAYS 42.5 g 3    Teriflunomide (AUBAGIO) 7 MG Tab Take 14 mg by mouth every day. 60 Tablet 5    mometasone (NASONEX) 50 MCG/ACT nasal spray SPRAY 2 SPRAYS INTO EACH NOSTRIL TWICE A DAY 17 g 0    modafinil (PROVIGIL) 200 MG Tab TAKE 1 TABLET BY MOUTH EVERY DAY FOR 30 DAYS. G35 30 Tablet 5    Teriflunomide (AUBAGIO) 14 MG Tab Take 1 Tablet by mouth every day. 30 Tablet 11    acyclovir (ZOVIRAX) 400 MG tablet TAKE 2 TABLETS BY MOUTH TWICE A DAY FOR 5 DAYS 360 Tablet 1    albuterol 108 (90 Base) MCG/ACT Aero Soln inhalation aerosol Inhale 2 Puffs every 6 hours as needed for Shortness of Breath. 8.5 g 0    albuterol (PROVENTIL) 2.5mg/3ml Nebu Soln solution for nebulization Take 3 mL by nebulization every four hours as needed for Shortness of Breath. 30 Each 2    rizatriptan (MAXALT) 10 MG tablet TAKE 1 TABLET BY MOUTH AT HEADACHE ONSET REPEAT EVERY 1-2 HOURS AS NEEDED UP TO #3 TAB/24 HOUR 10 Tablet 11    amitriptyline (ELAVIL) 25 MG Tab Take 1 Tablet by mouth at bedtime. 90 Tablet 3    pentosan (ELMIRON) 100 MG Cap TAKE 1 CAPSULE BY MOUTH 3 TIMES A DAY BEFORE MEALS 270 Capsule 3    zolmitriptan (ZOMIG) 5 MG nasal solution INSTILL 1 SPRAY IN NOSE ONCE A DAY AS NEEDED FOR HEADACHE FOR UP TO 1 DOSE 6 Each 11    montelukast (SINGULAIR) 10 MG Tab TAKE 1 TABLET BY MOUTH IN THE EVENING 90 Tablet 3    phenazopyridine (PYRIDIUM) 200 MG Tab Take 1 Tablet by mouth 3 times a day as needed for Moderate Pain. 30 Tablet 2    Nutritional Supplements (JUICE PLUS FIBRE PO) Take  by mouth.      vitamin D (CHOLECALCIFEROL)  "1000 UNIT Tab Take  by mouth every day.      cetirizine (ZYRTEC) 10 MG Tab Take 10 mg by mouth every day. Half daily       No current [facility]-ordered facility-administered medications on file.       Allergies:  Sulfa drugs    Health Maintenance: Completed    Review of Systems:  No fevers or chills. No cough, chest pain, or shortness of breath.       Objective:       Exam:  /72   Pulse 77   Temp 37 °C (98.6 °F) (Temporal)   Ht 1.626 m (5' 4\")   Wt 65.8 kg (145 lb)   LMP 04/25/2018   SpO2 97%   BMI 24.89 kg/m²  Body mass index is 24.89 kg/m².    Gen: Alert and oriented, No apparent distress.  Lungs: Normal effort, CTA bilaterally, no wheezes, rhonchi, or rales  CV: Regular rate and rhythm. No murmurs, rubs, or gallops.  Ext: 5 mm corn bottom of right foot        Assessment & Plan:     53 y.o. female with the following -       MS (multiple sclerosis) (HCC)  Neutropenia associated with autoimmune disease (HCC)  Elevated MCV  Chronic condition, controlled.  The patient is managed by neurology, Dr. Shelley.  No flares requiring hospitalization since last visit.  She does report increased recurrent episodes of fatigue which I think are likely MS related.    -Continue Aubagio 14 mg daily and modafinil 200 mg as needed for MS associated fatigue as managed by neurology     Mixed hyperlipidemia  Chronic condition, stable.  Currently managed through dietary modification and regular exercise.  Lipid panel on 4/6/2023 showed a total cholesterol 230, , HDL 80, triglycerides 66. The 10-year ASCVD risk score ([place] DK, et al., 2019) is: 0.8%  -Continue dietary management with a low-cholesterol diet given the patient's low 10-year ASCVD risk score  - Comp Metabolic Panel; Future  - Lipid Profile; Future     Vitamin D deficiency  Chronic condition, controlled.  Vitamin D level on 4/6/2023 is 59.   -Continue vitamin D 1000 units daily  - VITAMIN D,25 HYDROXY (DEFICIENCY); Future    Herpes labialis  Chronic condition, " controlled.  The patient takes acyclovir 400 mg twice daily.  She is requesting a refill of the medication at today's visit.  -Continue current regimen of acyclovir 400 mg twice daily  - acyclovir (ZOVIRAX) 400 MG tablet; Take 1 Tablet by mouth 2 times a day.  Dispense: 720 Tablet; Refill: 0    Corn of foot  Acute condition.  Discussed OTC medications and protective pads as well as referral to podiatry.  - Referral to Podiatry    Screening for deficiency anemia  - CBC WITH DIFFERENTIAL; Future    Encounter for screening for diabetes mellitus  - Comp Metabolic Panel; Future    Thyroid disorder screen  - TSH WITH REFLEX TO FT4; Future    Encounter for screening for HIV  - HIV AG/AB COMBO ASSAY SCREENING; Future    Need for hepatitis C screening test  - HEP C VIRUS ANTIBODY; Future      Return in about 1 year (around 5/9/2025) for Annual/Wellness Visit.    Please note that this dictation was created using voice recognition software. I have made every reasonable attempt to correct obvious errors, but I expect that there are errors of grammar and possibly content that I did not discover before finalizing the note.

## 2024-05-09 ENCOUNTER — OFFICE VISIT (OUTPATIENT)
Dept: MEDICAL GROUP | Facility: PHYSICIAN GROUP | Age: 54
End: 2024-05-09
Payer: COMMERCIAL

## 2024-05-09 VITALS
SYSTOLIC BLOOD PRESSURE: 114 MMHG | OXYGEN SATURATION: 97 % | HEIGHT: 64 IN | BODY MASS INDEX: 24.75 KG/M2 | DIASTOLIC BLOOD PRESSURE: 72 MMHG | WEIGHT: 145 LBS | TEMPERATURE: 98.6 F | HEART RATE: 77 BPM

## 2024-05-09 DIAGNOSIS — Z11.4 ENCOUNTER FOR SCREENING FOR HIV: ICD-10-CM

## 2024-05-09 DIAGNOSIS — G35 MS (MULTIPLE SCLEROSIS) (HCC): ICD-10-CM

## 2024-05-09 DIAGNOSIS — M35.9 NEUTROPENIA ASSOCIATED WITH AUTOIMMUNE DISEASE (HCC): ICD-10-CM

## 2024-05-09 DIAGNOSIS — Z13.1 ENCOUNTER FOR SCREENING FOR DIABETES MELLITUS: ICD-10-CM

## 2024-05-09 DIAGNOSIS — Z13.29 THYROID DISORDER SCREEN: ICD-10-CM

## 2024-05-09 DIAGNOSIS — E78.2 MIXED HYPERLIPIDEMIA: ICD-10-CM

## 2024-05-09 DIAGNOSIS — B00.1 HERPES LABIALIS: ICD-10-CM

## 2024-05-09 DIAGNOSIS — Z11.59 NEED FOR HEPATITIS C SCREENING TEST: ICD-10-CM

## 2024-05-09 DIAGNOSIS — Z13.0 SCREENING FOR DEFICIENCY ANEMIA: ICD-10-CM

## 2024-05-09 DIAGNOSIS — R71.8 ELEVATED MCV: ICD-10-CM

## 2024-05-09 DIAGNOSIS — E55.9 VITAMIN D DEFICIENCY: ICD-10-CM

## 2024-05-09 DIAGNOSIS — L84 CORN OF FOOT: ICD-10-CM

## 2024-05-09 DIAGNOSIS — D70.4 NEUTROPENIA ASSOCIATED WITH AUTOIMMUNE DISEASE (HCC): ICD-10-CM

## 2024-05-09 PROCEDURE — 3074F SYST BP LT 130 MM HG: CPT | Performed by: INTERNAL MEDICINE

## 2024-05-09 PROCEDURE — 99214 OFFICE O/P EST MOD 30 MIN: CPT | Performed by: INTERNAL MEDICINE

## 2024-05-09 PROCEDURE — 3078F DIAST BP <80 MM HG: CPT | Performed by: INTERNAL MEDICINE

## 2024-05-09 RX ORDER — ACYCLOVIR 400 MG/1
400 TABLET ORAL 2 TIMES DAILY
Qty: 720 TABLET | Refills: 0 | Status: SHIPPED | OUTPATIENT
Start: 2024-05-09 | End: 2025-05-09

## 2024-05-09 RX ORDER — PROGESTERONE 100 MG/1
CAPSULE ORAL
COMMUNITY
Start: 2024-05-01 | End: 2024-05-09

## 2024-05-09 RX ORDER — ESTRADIOL 0.1 MG/G
CREAM VAGINAL
Qty: 42.5 G | Refills: 3 | Status: SHIPPED | OUTPATIENT
Start: 2024-05-09

## 2024-05-09 RX ORDER — ESTRADIOL 0.05 MG/D
PATCH, EXTENDED RELEASE TRANSDERMAL
COMMUNITY
Start: 2024-05-01 | End: 2024-05-09

## 2024-05-09 ASSESSMENT — FIBROSIS 4 INDEX: FIB4 SCORE: 0.59

## 2024-05-17 DIAGNOSIS — G35 MS (MULTIPLE SCLEROSIS) (HCC): ICD-10-CM

## 2024-05-17 RX ORDER — TERIFLUNOMIDE 7 MG/1
14 TABLET, FILM COATED ORAL DAILY
Qty: 60 TABLET | Refills: 5 | Status: SHIPPED | OUTPATIENT
Start: 2024-05-17

## 2024-05-17 NOTE — TELEPHONE ENCOUNTER
Received request via: Patient    Medication Name/Dosage Aubagio     When was medication last prescribed 4/18/2024- Needed to change pharmacy    How many refills were previously provided 5    How many Refills does he patient have left from last prescription 5    Was the patient seen in the last year in this department? Yes   Date of last office visit 9/27/2023     Per last Neurology Office Visit, when was the date of next follow up visit set for?                          1 yr  Date of office visit follow up request 9/27/2024     Does the patient have an upcoming appointment? Yes   If yes, when 6/24/24             If no, schedule appointment     Does the patient have [facility] Plus and need 100 day supply (blood pressure, diabetes and cholesterol meds only)? Medication is not for cholesterol, blood pressure or diabetes      PLEASE ADVISE; WE HAD TO CHANGE THIS PTS [facility]Y FROM [facility] IN ORDER FOR THE MEDICATION TO BE FILLED! THANK YOU IN ADVANCE! Patricia Medrano, Med Ass't

## 2024-05-20 ENCOUNTER — TELEPHONE (OUTPATIENT)
Dept: PHARMACY | Facility: MEDICAL CENTER | Age: 54
End: 2024-05-20
Payer: COMMERCIAL

## 2024-05-20 NOTE — TELEPHONE ENCOUNTER
Teriflunomide (AUBAGIO) 7 MG Tab    Received Renewal PA request via [employer]  for Aubagio. (Quantity:60, Day Supply:60)     Insurance: [facility]  Member ID:  [id number]  BIN: [id number]  PCN: [id number]  Group: [id number]     Ran Test claim via [facility] & medication  $ N/A.     Must Dispense Through Specialty Pharmacy. Future refills are required to be filled  through [facility]; Restricted to  [facility] /Lockout.     Please contact Alena car @ [phone]

## 2024-05-28 NOTE — TELEPHONE ENCOUNTER
Received request via: Patient    Was the patient seen in the last year in this department? Yes    Does the patient have an active prescription (recently filled or refills available) for medication(s) requested? No    Pharmacy Name: [facility]     Does the patient have [facility] Plus and need 100 day supply (blood pressure, diabetes and cholesterol meds only)? Patient does not have SCP

## 2024-06-01 RX ORDER — MOMETASONE FUROATE MONOHYDRATE 50 UG/1
SPRAY, METERED NASAL
Qty: 17 G | Refills: 3 | Status: SHIPPED | OUTPATIENT
Start: 2024-06-01 | End: 2024-06-03 | Stop reason: SDUPTHER

## 2024-06-03 ENCOUNTER — PATIENT MESSAGE (OUTPATIENT)
Dept: MEDICAL GROUP | Facility: PHYSICIAN GROUP | Age: 54
End: 2024-06-03
Payer: COMMERCIAL

## 2024-06-03 RX ORDER — MOMETASONE FUROATE MONOHYDRATE 50 UG/1
SPRAY, METERED NASAL
Qty: 17 G | Refills: 3 | Status: SHIPPED | OUTPATIENT
Start: 2024-06-03

## 2024-06-21 ENCOUNTER — HOSPITAL ENCOUNTER (OUTPATIENT)
Dept: RADIOLOGY | Facility: MEDICAL CENTER | Age: 54
End: 2024-06-21
Attending: PSYCHIATRY & NEUROLOGY
Payer: COMMERCIAL

## 2024-06-21 DIAGNOSIS — G35 MS (MULTIPLE SCLEROSIS) (HCC): ICD-10-CM

## 2024-06-21 PROCEDURE — 72157 MRI CHEST SPINE W/O & W/DYE: CPT

## 2024-06-21 PROCEDURE — 70553 MRI BRAIN STEM W/O & W/DYE: CPT

## 2024-06-21 PROCEDURE — 700117 HCHG RX CONTRAST REV CODE 255: Performed by: PSYCHIATRY & NEUROLOGY

## 2024-06-21 PROCEDURE — A9579 GAD-BASE MR CONTRAST NOS,1ML: HCPCS | Performed by: PSYCHIATRY & NEUROLOGY

## 2024-06-21 RX ADMIN — GADOTERIDOL 10 ML: 279.3 INJECTION, SOLUTION INTRAVENOUS at 09:56

## 2024-06-24 ENCOUNTER — OFFICE VISIT (OUTPATIENT)
Dept: NEUROLOGY | Facility: MEDICAL CENTER | Age: 54
End: 2024-06-24
Attending: PSYCHIATRY & NEUROLOGY
Payer: COMMERCIAL

## 2024-06-24 VITALS
DIASTOLIC BLOOD PRESSURE: 60 MMHG | HEART RATE: 79 BPM | OXYGEN SATURATION: 100 % | WEIGHT: 143.74 LBS | TEMPERATURE: 98.1 F | HEIGHT: 64 IN | RESPIRATION RATE: 16 BRPM | BODY MASS INDEX: 24.54 KG/M2 | SYSTOLIC BLOOD PRESSURE: 114 MMHG

## 2024-06-24 DIAGNOSIS — G43.009 MIGRAINE WITHOUT AURA AND WITHOUT STATUS MIGRAINOSUS, NOT INTRACTABLE: ICD-10-CM

## 2024-06-24 DIAGNOSIS — G35 MS (MULTIPLE SCLEROSIS) (HCC): Primary | ICD-10-CM

## 2024-06-24 PROCEDURE — 3078F DIAST BP <80 MM HG: CPT | Performed by: PSYCHIATRY & NEUROLOGY

## 2024-06-24 PROCEDURE — 99212 OFFICE O/P EST SF 10 MIN: CPT | Performed by: PSYCHIATRY & NEUROLOGY

## 2024-06-24 PROCEDURE — 3074F SYST BP LT 130 MM HG: CPT | Performed by: PSYCHIATRY & NEUROLOGY

## 2024-06-24 PROCEDURE — 99215 OFFICE O/P EST HI 40 MIN: CPT | Performed by: PSYCHIATRY & NEUROLOGY

## 2024-06-24 RX ORDER — ZOLMITRIPTAN 5 MG/1
SPRAY NASAL
Qty: 6 EACH | Refills: 11 | Status: SHIPPED | OUTPATIENT
Start: 2024-06-24

## 2024-06-24 ASSESSMENT — ENCOUNTER SYMPTOMS
DOUBLE VISION: 0
MEMORY LOSS: 0
FOCAL WEAKNESS: 0
BLURRED VISION: 0
CONSTIPATION: 0
TREMORS: 0
HEADACHES: 1
SENSORY CHANGE: 0
FALLS: 0

## 2024-06-24 ASSESSMENT — FIBROSIS 4 INDEX: FIB4 SCORE: 0.59

## 2024-06-24 ASSESSMENT — PATIENT HEALTH QUESTIONNAIRE - PHQ9: CLINICAL INTERPRETATION OF PHQ2 SCORE: 0

## 2024-06-24 NOTE — PROGRESS NOTES
Subjective     Ct Montalvo is a 53 y.o. female who presents with her  Hemanth, for 1 year follow-up, with a history of MS and migraine without aura.     HPI    MS: Ct has done very well, in the last year, she has had no episodes suggestive of disease relapse.  Even the symptoms she does have including malaise and fatigue, seem to be responding, the latter now that she is taking her Provigil as prescribed on a daily basis.    Cognition has always been intact, there is no difference.  She still has the malaise and fatigue, Provigil providing consistent benefit.  There is no visual deficit, no episodes of diplopia associated with slurred speech or postural instability.  There is no sensory distortion across the face.  She has no problems with focal weakness, incoordination of the extremities, balance is always been stable.  Even the constrictive lesion along the bra line bilaterally is no longer apparent.  Lhermitte's phenomena has never been an issue.  Bowel and bladder control are stable.    MRI scan of the brain and thoracic spine from June 21, 2024 actually showed resolution of the T7/8 level thoracic cord lesion (!), and a stable burden of disease in the brain itself.  There was a small mucous retention cyst in the right maxillary sinus.  She is scheduled to have CBC and CMP checked in the next couple of weeks.  Liver profile, WBC and ALC counts have always been unchanged, WBC count always a little on the low side.    Migraine: Stable, she has the Maxalt and Zomig nasal spray to use as needed.  She tries the first, still there is about a 50% failure rate, final rescue with Zomig always is adequate.  Frequency of the headaches has not changed over the last year.    Medical, surgical and family histories are reviewed, there are no new drug allergies.  She is on Aubagio 14 mg daily, Provigil 100 mg daily, Pyridium 200 mg 3 times daily as needed, Maxalt 10 mg and Zomig 5 mg nasal spray as needed,  "Elavil 25 mg nightly, Estrace, Singulair, Zovirax, albuterol inhalers as needed, vitamin D and calcium.    Review of Systems   Constitutional:  Positive for malaise/fatigue.   Eyes:  Negative for blurred vision and double vision.   Gastrointestinal:  Negative for constipation.   Genitourinary:  Negative for dysuria.   Musculoskeletal:  Negative for falls.   Neurological:  Positive for headaches. Negative for tremors, sensory change and focal weakness.   Psychiatric/Behavioral:  Negative for memory loss.    All other systems reviewed and are negative.    Objective     /60 (BP Location: Right arm, Patient Position: Sitting, BP Cuff Size: Adult)   Pulse 79   Temp 36.7 °C (98.1 °F) (Temporal)   Resp 16   Ht 1.626 m (5' 4\")   Wt 65.2 kg (143 lb 11.8 oz)   LMP 04/25/2018   SpO2 100%   BMI 24.67 kg/m²      Physical Exam    She appears in no acute distress.  Vital signs are stable.  There is no malar rash, jaw or temporal tenderness, or jaw claudication.  The neck is supple, range of motion is full, Lhermitte's phenomena is absent.  Cardiac evaluation reveals a regular rhythm.     Neurological Exam    Fully oriented, there is no aphasia, apraxia, or inattention.    PERRLA/EOMI, visual fields are full to finger counting on confrontation bilaterally.  Facial movements are symmetric, sensory exam is intact to temperature and light touch bilaterally.  There is no extinction.  The tongue and uvula are midline, jaw movements are intact, there is no bulbar dysfunction.  Jaw jerk is absent.  Shoulder shrug and head rotation are normal.    Musculoskeletal exam reveals normal tone bilaterally, there is no tremor, asterixis, or drift.  Strength is 5/5 in all muscle groups in all 4 extremities.  Reflexes are brisk and present, there are no asymmetries from side-to-side, none are dropped.  The toes are downgoing.  There is no clonus.    She stands easily, gait is normal and station and stride length, armswing is symmetric. "  Heel, toe, and tandem walking are all done without issue.  There is no appendicular dystaxia.  Fine motor control and repetitive movements are normal and symmetric throughout.    Sensory exam is intact to vibration and temperature.  Romberg is absent.  There is no sensory level.    Assessment & Plan     1. MS (multiple sclerosis) (HCC)  Stable clinically, and radiographically, MRI of the brain getting back to 2018 revealed stable burdens of disease and no evidence of enhancement.  The positive change was a resolution of the mid thoracic cord lesion with this most recent imaging study.  This type of dynamic process is always a good thing.  Given the stability otherwise, clinically and radiographically, I think we can stretch the interval between imaging to 2 years.  The rationale for this was reviewed.  She was told she needs to stay on the teriflunomide, regardless.  She also knows to be compliant with her Provigil.  I will follow-up with the CBC and CMP values that were recently ordered by Dr. Roque.  She knows to call the office if she has any symptoms suggestive of disease relapse.  Given her disease stability, we will continue the 1 year follow-up.    2. Migraine without aura and without status migrainosus, not intractable  Stable, we continue the Zomig and Maxalt use.  She is certainly not overusing them.    - zolmitriptan (ZOMIG) 5 MG nasal solution; INSTILL 1 SPRAY IN NOSE ONCE A DAY AS NEEDED FOR HEADACHE FOR UP TO 1 DOSE  Dispense: 6 Each; Refill: 11    Time: 40 minutes in total spent on patient care including pre-charting, record review, discussion with healthcare staff and documentation.  This includes face-to-face time for exam, review, discussion, as well as counseling and coordinating care.

## 2024-07-15 RX ORDER — MONTELUKAST SODIUM 10 MG/1
TABLET ORAL
Qty: 90 TABLET | Refills: 3 | Status: SHIPPED | OUTPATIENT
Start: 2024-07-15

## 2024-08-29 ENCOUNTER — OFFICE VISIT (OUTPATIENT)
Dept: MEDICAL GROUP | Facility: PHYSICIAN GROUP | Age: 54
End: 2024-08-29
Payer: COMMERCIAL

## 2024-08-29 VITALS
RESPIRATION RATE: 20 BRPM | TEMPERATURE: 98.7 F | HEART RATE: 82 BPM | WEIGHT: 145.38 LBS | HEIGHT: 64 IN | DIASTOLIC BLOOD PRESSURE: 68 MMHG | BODY MASS INDEX: 24.82 KG/M2 | OXYGEN SATURATION: 98 % | SYSTOLIC BLOOD PRESSURE: 110 MMHG

## 2024-08-29 DIAGNOSIS — M35.9 NEUTROPENIA ASSOCIATED WITH AUTOIMMUNE DISEASE (HCC): ICD-10-CM

## 2024-08-29 DIAGNOSIS — E55.9 VITAMIN D DEFICIENCY: ICD-10-CM

## 2024-08-29 DIAGNOSIS — Z11.59 NEED FOR HEPATITIS C SCREENING TEST: ICD-10-CM

## 2024-08-29 DIAGNOSIS — N30.10 CHRONIC INTERSTITIAL CYSTITIS: ICD-10-CM

## 2024-08-29 DIAGNOSIS — G43.009 MIGRAINE WITHOUT AURA AND WITHOUT STATUS MIGRAINOSUS, NOT INTRACTABLE: ICD-10-CM

## 2024-08-29 DIAGNOSIS — D70.4 NEUTROPENIA ASSOCIATED WITH AUTOIMMUNE DISEASE (HCC): ICD-10-CM

## 2024-08-29 DIAGNOSIS — E78.2 MIXED HYPERLIPIDEMIA: ICD-10-CM

## 2024-08-29 DIAGNOSIS — J30.2 SEASONAL ALLERGIES: ICD-10-CM

## 2024-08-29 DIAGNOSIS — Z11.3 SCREENING EXAMINATION FOR SEXUALLY TRANSMITTED DISEASE: ICD-10-CM

## 2024-08-29 DIAGNOSIS — Z13.228 SCREENING FOR ENDOCRINE, METABOLIC AND IMMUNITY DISORDER: ICD-10-CM

## 2024-08-29 DIAGNOSIS — Z13.29 SCREENING FOR ENDOCRINE, METABOLIC AND IMMUNITY DISORDER: ICD-10-CM

## 2024-08-29 DIAGNOSIS — Z86.19 HISTORY OF COLD SORES: ICD-10-CM

## 2024-08-29 DIAGNOSIS — G35 MS (MULTIPLE SCLEROSIS) (HCC): ICD-10-CM

## 2024-08-29 DIAGNOSIS — Z13.0 SCREENING FOR ENDOCRINE, METABOLIC AND IMMUNITY DISORDER: ICD-10-CM

## 2024-08-29 DIAGNOSIS — N95.2 VAGINAL ATROPHY: ICD-10-CM

## 2024-08-29 PROBLEM — M25.569 KNEE PAIN: Status: RESOLVED | Noted: 2021-01-27 | Resolved: 2024-08-29

## 2024-08-29 RX ORDER — LACTOBACILLUS RHAMNOSUS GG 10B CELL
CAPSULE ORAL
COMMUNITY

## 2024-08-29 SDOH — ECONOMIC STABILITY: FOOD INSECURITY: WITHIN THE PAST 12 MONTHS, THE FOOD YOU BOUGHT JUST DIDN'T LAST AND YOU DIDN'T HAVE MONEY TO GET MORE.: NEVER TRUE

## 2024-08-29 SDOH — ECONOMIC STABILITY: FOOD INSECURITY: WITHIN THE PAST 12 MONTHS, YOU WORRIED THAT YOUR FOOD WOULD RUN OUT BEFORE YOU GOT MONEY TO BUY MORE.: NEVER TRUE

## 2024-08-29 SDOH — HEALTH STABILITY: PHYSICAL HEALTH: ON AVERAGE, HOW MANY MINUTES DO YOU ENGAGE IN EXERCISE AT THIS LEVEL?: 30 MIN

## 2024-08-29 SDOH — HEALTH STABILITY: PHYSICAL HEALTH: ON AVERAGE, HOW MANY DAYS PER WEEK DO YOU ENGAGE IN MODERATE TO STRENUOUS EXERCISE (LIKE A BRISK WALK)?: 5 DAYS

## 2024-08-29 SDOH — ECONOMIC STABILITY: INCOME INSECURITY: IN THE LAST 12 MONTHS, WAS THERE A TIME WHEN YOU WERE NOT ABLE TO PAY THE MORTGAGE OR RENT ON TIME?: NO

## 2024-08-29 SDOH — ECONOMIC STABILITY: INCOME INSECURITY: HOW HARD IS IT FOR YOU TO PAY FOR THE VERY BASICS LIKE FOOD, HOUSING, MEDICAL CARE, AND HEATING?: NOT HARD AT ALL

## 2024-08-29 ASSESSMENT — ENCOUNTER SYMPTOMS
PALPITATIONS: 0
HEADACHES: 0
COUGH: 0
SHORTNESS OF BREATH: 0
BLOOD IN STOOL: 0
CONSTIPATION: 0
NAUSEA: 0
TINGLING: 0
DIARRHEA: 0
FEVER: 0
CHILLS: 0
VOMITING: 0
DIZZINESS: 0
WEIGHT LOSS: 0
WHEEZING: 0
ABDOMINAL PAIN: 0

## 2024-08-29 ASSESSMENT — SOCIAL DETERMINANTS OF HEALTH (SDOH)
HOW MANY DRINKS CONTAINING ALCOHOL DO YOU HAVE ON A TYPICAL DAY WHEN YOU ARE DRINKING: 1 OR 2
IN A TYPICAL WEEK, HOW MANY TIMES DO YOU TALK ON THE PHONE WITH FAMILY, FRIENDS, OR NEIGHBORS?: MORE THAN THREE TIMES A WEEK
HOW OFTEN DO YOU HAVE SIX OR MORE DRINKS ON ONE OCCASION: NEVER
HOW OFTEN DO YOU ATTENT MEETINGS OF THE CLUB OR ORGANIZATION YOU BELONG TO?: MORE THAN 4 TIMES PER YEAR
WITHIN THE PAST 12 MONTHS, YOU WORRIED THAT YOUR FOOD WOULD RUN OUT BEFORE YOU GOT THE MONEY TO BUY MORE: NEVER TRUE
HOW OFTEN DO YOU ATTEND CHURCH OR RELIGIOUS SERVICES?: MORE THAN 4 TIMES PER YEAR
HOW OFTEN DO YOU GET TOGETHER WITH FRIENDS OR RELATIVES?: TWICE A WEEK
HOW OFTEN DO YOU ATTEND CHURCH OR RELIGIOUS SERVICES?: MORE THAN 4 TIMES PER YEAR
HOW HARD IS IT FOR YOU TO PAY FOR THE VERY BASICS LIKE FOOD, HOUSING, MEDICAL CARE, AND HEATING?: NOT HARD AT ALL
HOW OFTEN DO YOU ATTENT MEETINGS OF THE CLUB OR ORGANIZATION YOU BELONG TO?: MORE THAN 4 TIMES PER YEAR
IN A TYPICAL WEEK, HOW MANY TIMES DO YOU TALK ON THE PHONE WITH FAMILY, FRIENDS, OR NEIGHBORS?: MORE THAN THREE TIMES A WEEK
IN THE PAST 12 MONTHS, HAS THE ELECTRIC, GAS, OIL, OR WATER COMPANY THREATENED TO SHUT OFF SERVICE IN YOUR HOME?: NO
HOW OFTEN DO YOU HAVE A DRINK CONTAINING ALCOHOL: MONTHLY OR LESS
DO YOU BELONG TO ANY CLUBS OR ORGANIZATIONS SUCH AS CHURCH GROUPS UNIONS, FRATERNAL OR ATHLETIC GROUPS, OR SCHOOL GROUPS?: YES
HOW OFTEN DO YOU GET TOGETHER WITH FRIENDS OR RELATIVES?: TWICE A WEEK
DO YOU BELONG TO ANY CLUBS OR ORGANIZATIONS SUCH AS CHURCH GROUPS UNIONS, FRATERNAL OR ATHLETIC GROUPS, OR SCHOOL GROUPS?: YES

## 2024-08-29 ASSESSMENT — LIFESTYLE VARIABLES
SKIP TO QUESTIONS 9-10: 1
HOW OFTEN DO YOU HAVE SIX OR MORE DRINKS ON ONE OCCASION: NEVER
HOW OFTEN DO YOU HAVE A DRINK CONTAINING ALCOHOL: MONTHLY OR LESS
AUDIT-C TOTAL SCORE: 1
HOW MANY STANDARD DRINKS CONTAINING ALCOHOL DO YOU HAVE ON A TYPICAL DAY: 1 OR 2

## 2024-08-29 ASSESSMENT — FIBROSIS 4 INDEX: FIB4 SCORE: 0.6

## 2024-08-29 NOTE — LETTER
[facility]  JUDD Watts  [address]  [facility] [address]  Fax: [phone]   Authorization for Release/Disclosure of   Protected Health Information   Name: CT MONTALVO : 1970 SSN: [id number]   Address: [address] [place] [id number] Phone:    There are no phone numbers on file.   I authorize the entity listed below to release/disclose the PHI below to:   [facility]/JUDD Watts and JUDD Watts   Provider or Entity Name:     Address   City, State, Zip   Phone:      Fax:     Reason for request: continuity of care   Information to be released:    [  ] LAST COLONOSCOPY,  including any PATH REPORT and follow-up  [  ] LAST FIT/COLOGUARD RESULT [  ] LAST DEXA  [  ] LAST MAMMOGRAM  [XX] LAST PAP  [  ] LAST LABS [  ] RETINA EXAM REPORT  [  ] IMMUNIZATION RECORDS  [  ] Release all info      [  ] Check here and initial the line next to each item to release ALL health information INCLUDING  _____ Care and treatment for drug and / or alcohol abuse  _____ HIV testing, infection status, or AIDS  _____ Genetic Testing    DATES OF SERVICE OR TIME PERIOD TO BE DISCLOSED: _____________  I understand and acknowledge that:  * This Authorization may be revoked at any time by you in writing, except if your health information has already been used or disclosed.  * Your health information that will be used or disclosed as a result of you signing this authorization could be re-disclosed by the recipient. If this occurs, your re-disclosed health information may no longer be protected by State or Federal laws.  * You may refuse to sign this Authorization. Your refusal will not affect your ability to obtain treatment.  * This Authorization becomes effective upon signing and will  on (date) __________.      If no date is indicated, this Authorization will  one (1) year from the signature date.    Name: Ct Montalvo  Signature: Date:    8/29/2024     PLEASE FAX REQUESTED RECORDS BACK TO: [phone]

## 2024-08-29 NOTE — PROGRESS NOTES
Subjective:     Chief Complaint   Patient presents with    Establish Care     Ctnegrito Montalvo is a 54 y.o. female, who is here today to establish care and discuss medications and lab work. Her prior PCP was Anika Roque MD.    Problem   Seasonal Allergies    Currently taking montelukast 10 mg daily as well as using Zyrtec 10 mg daily has albuterol inhalers as needed.  Symptoms well-controlled on this current treatment plan denies any symptoms or worsening symptoms today in office.     Mixed Hyperlipidemia    Currently not managed with any cholesterol-lowering medication and/or low-dose aspirin.  Lab Results   Component Value Date/Time    CHOLSTRLTOT 230 (H) 04/06/2023 0708    TRIGLYCERIDE 66 04/06/2023 0708    HDL 80 04/06/2023 0708     (H) 04/06/2023 0708   The 10-year ASCVD risk score (Nori LUJAN, et al., 2019) is: 1.1%     Ms (Multiple Sclerosis) (Hcc)    Managed by [facility] neurology, Dr. Belcher  Currently taking teriflunomide 14 mg daily  That her last MRI showed no signs of any lesions at this point.  Is well-managed on this current treatment plan.     History of Cold Sores    Currently managed with 4 mg daily; has been on this medication for years  Patient reports that it has been several years since she had a cold sore flare.  Prior to taking daily acyclovir patient would have a cold sore flare every 3 months or so.     Migraines    Managed by neurology, Dr. Belcher  Taking amitriptyline 25 mg nightly and has sumatriptan 5 mg as needed and rizatriptan 10 mg as needed     IC (interstitial cystitis)    Currently managed with pentosan 100 mg daily and amitriptyline 25 mg at bedtime.  Patient also has Pyridium to help with urinary pain as needed  Previously seeing urology in [place], began having issues with their office and was taken over by her previous PCP  Patient is doing well on these medications no concerns in office today     Knee Pain (Resolved)   Allergies: Sulfa drugs    Current Outpatient  Medications:     Specialty Vitamins Products (ONE-A-DAY CHOLESTEROL PO), Take  by mouth., Disp: , Rfl:     Probiotic Product (CULTURELLE IMMUNE DEFENSE) Cap, Take  by mouth., Disp: , Rfl:     montelukast (SINGULAIR) 10 MG Tab, TAKE 1 TABLET BY MOUTH IN THE EVENING, Disp: 90 Tablet, Rfl: 3    zolmitriptan (ZOMIG) 5 MG nasal solution, INSTILL 1 SPRAY IN NOSE ONCE A DAY AS NEEDED FOR HEADACHE FOR UP TO 1 DOSE, Disp: 6 Each, Rfl: 11    mometasone (NASONEX) 50 MCG/ACT nasal spray, SPRAY 2 SPRAYS INTO EACH NOSTRIL TWICE A DAY, Disp: 17 g, Rfl: 3    pentosan (ELMIRON) 100 MG Cap, TAKE 1 CAPSULE BY MOUTH 3 TIMES A DAY BEFORE MEALS, Disp: 270 Capsule, Rfl: 3    acyclovir (ZOVIRAX) 400 MG tablet, Take 1 Tablet by mouth 2 times a day. (Patient taking differently: Take 400 mg by mouth every day. Take one tablet by mouth once daily), Disp: 720 Tablet, Rfl: 0    estradiol (ESTRACE) 0.1 MG/GM vaginal cream, INSERT 1 GRAM VAGINALLY TWO TIMES A WEEK 30 DAYS, Disp: 42.5 g, Rfl: 3    Teriflunomide (AUBAGIO) 14 MG Tab, Take 1 Tablet by mouth every day., Disp: 30 Tablet, Rfl: 11    acyclovir (ZOVIRAX) 400 MG tablet, TAKE 2 TABLETS BY MOUTH TWICE A DAY FOR 5 DAYS, Disp: 360 Tablet, Rfl: 1    albuterol 108 (90 Base) MCG/ACT Aero Soln inhalation aerosol, Inhale 2 Puffs every 6 hours as needed for Shortness of Breath., Disp: 8.5 g, Rfl: 0    albuterol (PROVENTIL) 2.5mg/3ml Nebu Soln solution for nebulization, Take 3 mL by nebulization every four hours as needed for Shortness of Breath., Disp: 30 Each, Rfl: 2    rizatriptan (MAXALT) 10 MG tablet, TAKE 1 TABLET BY MOUTH AT HEADACHE ONSET REPEAT EVERY 1-2 HOURS AS NEEDED UP TO #3 TAB/24 HOUR, Disp: 10 Tablet, Rfl: 11    amitriptyline (ELAVIL) 25 MG Tab, Take 1 Tablet by mouth at bedtime., Disp: 90 Tablet, Rfl: 3    phenazopyridine (PYRIDIUM) 200 MG Tab, Take 1 Tablet by mouth 3 times a day as needed for Moderate Pain., Disp: 30 Tablet, Rfl: 2    Nutritional Supplements (JUICE PLUS FIBRE PO),  "Take  by mouth., Disp: , Rfl:     vitamin D (CHOLECALCIFEROL) 1000 UNIT Tab, Take  by mouth every day., Disp: , Rfl:     cetirizine (ZYRTEC) 10 MG Tab, Take 10 mg by mouth every day. Half daily, Disp: , Rfl:      Review of Systems   Constitutional:  Negative for chills, fever and weight loss.   Respiratory:  Negative for cough, shortness of breath and wheezing.    Cardiovascular:  Negative for chest pain, palpitations and leg swelling.   Gastrointestinal:  Negative for abdominal pain, blood in stool, constipation, diarrhea, nausea and vomiting.   Genitourinary:  Negative for hematuria.   Skin:  Negative for itching and rash.   Neurological:  Negative for dizziness, tingling and headaches.     Health Maintenance: Completed    Objective:     /68 (BP Location: Left arm, Patient Position: Sitting, BP Cuff Size: Adult)   Pulse 82   Temp 37.1 °C (98.7 °F) (Temporal)   Resp 20   Ht 1.626 m (5' 4\")   Wt 65.9 kg (145 lb 6 oz)   SpO2 98%  Body mass index is 24.95 kg/m².    Physical Exam  Vitals reviewed.   Constitutional:       General: She is not in acute distress.     Appearance: Normal appearance. She is not ill-appearing.   Cardiovascular:      Rate and Rhythm: Normal rate and regular rhythm.      Heart sounds: Normal heart sounds.   Pulmonary:      Effort: Pulmonary effort is normal.      Breath sounds: Normal breath sounds.   Abdominal:      General: Abdomen is flat.      Palpations: Abdomen is soft.   Musculoskeletal:      Cervical back: Normal range of motion.   Skin:     General: Skin is warm and dry.   Neurological:      General: No focal deficit present.      Mental Status: She is alert and oriented to person, place, and time.   Psychiatric:         Mood and Affect: Mood normal.         Behavior: Behavior normal.         Thought Content: Thought content normal.         Judgment: Judgment normal.        Assessment & Plan:     The following plan was discussed through shared decision making with the " patient:    1. Mixed hyperlipidemia  Chronic, controlled.   The patient is not on a statin for primary prevention . The last cholesterol panel in 2024 was slightly elevated.  To continue on diet and lifestyle modifications including decreasing saturated fatty foods and increasing physical activity  - Comp Metabolic Panel; Future  - Lipid Profile; Future    2. IC (interstitial cystitis)  Chronic, controlled.  Symptoms well-controlled on pentosan 100 mg daily as well as amitriptyline 25 mg daily.  Continue with current regimen.  I did advise patient if her symptoms worsen we may want to consider referring to urology    3. Neutropenia associated with autoimmune disease (HCC)  Chronic, controlled  Last blood count showed normal white blood count.  Patient to get updated blood work  - CBC WITH DIFFERENTIAL; Future    4. MS (multiple sclerosis) (HCC)  Chronic, controlled.  Patient stable on teriflunomide 14 mg daily per the direction of neurology.  Continue current dosing and following up with specialist as directed    5. Migraine without aura and without status migrainosus, not intractable  Chronic, controlled.  Patient stable on zolmitriptan 5 mg as needed and rizatriptan 10 mg as needed.  Patient also is taking amitriptyline 25 mg that was originally used for migraines but has also been beneficial for her interstitial cystitis.  Continue with current regimen    6. Seasonal allergies  Chronic, controlled.  Patient takes montelukast 10 mg daily and Zyrtec as needed.  Continue with current regimen of this is managing her symptoms well    7. Vaginal atrophy  Chronic, controlled.  Patient symptoms well-managed while on the estradiol twice weekly cream.  Patient is concerned about risk.  I did discuss the low risk of estradiol in patients with family history of breast cancer or personal history.  I did advise patient on recommendations of nonhormonal lubricants.    8. History of cold sores  Chronic, controlled.  Patient  symptoms well-controlled on acyclovir 400 mg daily, has not had breakthrough symptoms with this we will continue to monitor for symptoms as well as blood work.    9. Screening for endocrine, metabolic and immunity disorder  Patient establishing care today in office; due for updated lab work/screenings.  Will follow-up in [facility] with lab results.    Discussed with patient that if there is a significant amount of lab abnormalities we will have a follow-up appointment and I will indicate this is in my follow-up message with lab results.  Patient stated verbal understanding.  - TSH WITH REFLEX TO FT4; Future    10. Vitamin D deficiency  See #9  - VITAMIN D,25 HYDROXY (DEFICIENCY); Future    11. Screening examination for sexually transmitted disease  See #9  - HIV AG/AB COMBO ASSAY SCREENING; Future    12. Need for hepatitis C screening test  See #9  - HEP C VIRUS ANTIBODY; Future      Return in about 5 months (around 1/29/2025) for Labs, Med Check.       Please note that this dictation was created using voice recognition software. I have made every reasonable attempt to correct obvious errors, but I expect that there are errors of grammar and possibly content that I did not discover before finalizing the note.    JESUS Watts  [facility]  [facility]

## 2024-09-05 ENCOUNTER — TELEPHONE (OUTPATIENT)
Dept: PHARMACY | Facility: MEDICAL CENTER | Age: 54
End: 2024-09-05
Payer: COMMERCIAL

## 2024-09-05 DIAGNOSIS — G35 MULTIPLE SCLEROSIS (HCC): Chronic | ICD-10-CM

## 2024-09-05 RX ORDER — MODAFINIL 200 MG/1
TABLET ORAL
Qty: 30 TABLET | Refills: 5 | Status: SHIPPED | OUTPATIENT
Start: 2024-09-05 | End: 2025-03-05

## 2024-09-05 NOTE — TELEPHONE ENCOUNTER
Received request via: Pharmacy    Medication Name/Dosage Modafinil 200 mg     When was medication last prescribed 2/26/24    How many refills were previously provided 5    How many Refills does the patient have left from last prescription 0    Was the patient seen in the last year in this department? Yes   Date of last office visit 6/24/24     Per last Neurology Office Visit, when was the date of next follow up visit set for?                          1 yr   Date of office visit follow up request 6/24/25     Does the patient have an upcoming appointment? No   If yes, when              If no, schedule appointment Schedule not out yet     Does the patient have [facility] and need 100 day supply (blood pressure, diabetes and cholesterol meds only)? Medication is not for blood pressure, diabetes, or cholesterol

## 2024-09-05 NOTE — TELEPHONE ENCOUNTER
Received [employer] Start request via [employer]  for modafinil (PROVIGIL) 200 MG Tab . (Quantity 30, Day Supply:30)     Insurance: [employer]  Member ID:  [id number]  BIN: [id number]  PCN: [id number]  Group: [id number]     Ran Test claim via [place] & medication Pays for a $7.00 copay. Will outreach to patient to offer specialty pharmacy services and or release to preferred pharmacy

## 2024-09-13 ENCOUNTER — HOSPITAL ENCOUNTER (OUTPATIENT)
Dept: LAB | Facility: MEDICAL CENTER | Age: 54
End: 2024-09-13
Payer: COMMERCIAL

## 2024-09-13 DIAGNOSIS — D70.4 NEUTROPENIA ASSOCIATED WITH AUTOIMMUNE DISEASE (HCC): ICD-10-CM

## 2024-09-13 DIAGNOSIS — E78.2 MIXED HYPERLIPIDEMIA: ICD-10-CM

## 2024-09-13 DIAGNOSIS — M35.9 NEUTROPENIA ASSOCIATED WITH AUTOIMMUNE DISEASE (HCC): ICD-10-CM

## 2024-09-13 DIAGNOSIS — Z11.59 NEED FOR HEPATITIS C SCREENING TEST: ICD-10-CM

## 2024-09-13 DIAGNOSIS — Z13.29 SCREENING FOR ENDOCRINE, METABOLIC AND IMMUNITY DISORDER: ICD-10-CM

## 2024-09-13 DIAGNOSIS — Z13.0 SCREENING FOR ENDOCRINE, METABOLIC AND IMMUNITY DISORDER: ICD-10-CM

## 2024-09-13 DIAGNOSIS — E55.9 VITAMIN D DEFICIENCY: ICD-10-CM

## 2024-09-13 DIAGNOSIS — Z11.3 SCREENING EXAMINATION FOR SEXUALLY TRANSMITTED DISEASE: ICD-10-CM

## 2024-09-13 DIAGNOSIS — Z13.228 SCREENING FOR ENDOCRINE, METABOLIC AND IMMUNITY DISORDER: ICD-10-CM

## 2024-09-13 LAB
BASOPHILS # BLD AUTO: 1.4 % (ref 0–1.8)
BASOPHILS # BLD: 0.06 K/UL (ref 0–0.12)
EOSINOPHIL # BLD AUTO: 0.2 K/UL (ref 0–0.51)
EOSINOPHIL NFR BLD: 4.7 % (ref 0–6.9)
ERYTHROCYTE [DISTWIDTH] IN BLOOD BY AUTOMATED COUNT: 46.8 FL (ref 35.9–50)
HCT VFR BLD AUTO: 40.8 % (ref 37–47)
HGB BLD-MCNC: 13.3 G/DL (ref 12–16)
IMM GRANULOCYTES # BLD AUTO: 0.01 K/UL (ref 0–0.11)
IMM GRANULOCYTES NFR BLD AUTO: 0.2 % (ref 0–0.9)
LYMPHOCYTES # BLD AUTO: 2.01 K/UL (ref 1–4.8)
LYMPHOCYTES NFR BLD: 47.5 % (ref 22–41)
MCH RBC QN AUTO: 32.6 PG (ref 27–33)
MCHC RBC AUTO-ENTMCNC: 32.6 G/DL (ref 32.2–35.5)
MCV RBC AUTO: 100 FL (ref 81.4–97.8)
MONOCYTES # BLD AUTO: 0.43 K/UL (ref 0–0.85)
MONOCYTES NFR BLD AUTO: 10.2 % (ref 0–13.4)
NEUTROPHILS # BLD AUTO: 1.52 K/UL (ref 1.82–7.42)
NEUTROPHILS NFR BLD: 36 % (ref 44–72)
NRBC # BLD AUTO: 0 K/UL
NRBC BLD-RTO: 0 /100 WBC (ref 0–0.2)
PLATELET # BLD AUTO: 358 K/UL (ref 164–446)
PMV BLD AUTO: 9.9 FL (ref 9–12.9)
RBC # BLD AUTO: 4.08 M/UL (ref 4.2–5.4)
WBC # BLD AUTO: 4.2 K/UL (ref 4.8–10.8)

## 2024-09-13 PROCEDURE — 36415 COLL VENOUS BLD VENIPUNCTURE: CPT

## 2024-09-13 PROCEDURE — 86803 HEPATITIS C AB TEST: CPT

## 2024-09-13 PROCEDURE — 85025 COMPLETE CBC W/AUTO DIFF WBC: CPT

## 2024-09-13 PROCEDURE — 80053 COMPREHEN METABOLIC PANEL: CPT

## 2024-09-13 PROCEDURE — 87389 HIV-1 AG W/HIV-1&-2 AB AG IA: CPT

## 2024-09-13 PROCEDURE — 82306 VITAMIN D 25 HYDROXY: CPT

## 2024-09-13 PROCEDURE — 84443 ASSAY THYROID STIM HORMONE: CPT

## 2024-09-13 PROCEDURE — 84439 ASSAY OF FREE THYROXINE: CPT

## 2024-09-13 PROCEDURE — 80061 LIPID PANEL: CPT

## 2024-09-14 LAB
25(OH)D3 SERPL-MCNC: 70 NG/ML (ref 30–100)
ALBUMIN SERPL BCP-MCNC: 4.5 G/DL (ref 3.2–4.9)
ALBUMIN/GLOB SERPL: 1.8 G/DL
ALP SERPL-CCNC: 70 U/L (ref 30–99)
ALT SERPL-CCNC: 37 U/L (ref 2–50)
ANION GAP SERPL CALC-SCNC: 12 MMOL/L (ref 7–16)
AST SERPL-CCNC: 30 U/L (ref 12–45)
BILIRUB SERPL-MCNC: 0.3 MG/DL (ref 0.1–1.5)
BUN SERPL-MCNC: 14 MG/DL (ref 8–22)
CALCIUM ALBUM COR SERPL-MCNC: 8.9 MG/DL (ref 8.5–10.5)
CALCIUM SERPL-MCNC: 9.3 MG/DL (ref 8.5–10.5)
CHLORIDE SERPL-SCNC: 106 MMOL/L (ref 96–112)
CHOLEST SERPL-MCNC: 239 MG/DL (ref 100–199)
CO2 SERPL-SCNC: 24 MMOL/L (ref 20–33)
CREAT SERPL-MCNC: 0.53 MG/DL (ref 0.5–1.4)
FASTING STATUS PATIENT QL REPORTED: NORMAL
GFR SERPLBLD CREATININE-BSD FMLA CKD-EPI: 110 ML/MIN/1.73 M 2
GLOBULIN SER CALC-MCNC: 2.5 G/DL (ref 1.9–3.5)
GLUCOSE SERPL-MCNC: 93 MG/DL (ref 65–99)
HCV AB SER QL: NORMAL
HDLC SERPL-MCNC: 81 MG/DL
HIV 1+2 AB+HIV1 P24 AG SERPL QL IA: NORMAL
LDLC SERPL CALC-MCNC: 148 MG/DL
POTASSIUM SERPL-SCNC: 4.2 MMOL/L (ref 3.6–5.5)
PROT SERPL-MCNC: 7 G/DL (ref 6–8.2)
SODIUM SERPL-SCNC: 142 MMOL/L (ref 135–145)
T4 FREE SERPL-MCNC: 0.73 NG/DL (ref 0.93–1.7)
TRIGL SERPL-MCNC: 52 MG/DL (ref 0–149)
TSH SERPL DL<=0.005 MIU/L-ACNC: 10.3 UIU/ML (ref 0.38–5.33)

## 2024-09-16 ENCOUNTER — OFFICE VISIT (OUTPATIENT)
Dept: MEDICAL GROUP | Facility: PHYSICIAN GROUP | Age: 54
End: 2024-09-16
Payer: COMMERCIAL

## 2024-09-16 VITALS
HEART RATE: 98 BPM | DIASTOLIC BLOOD PRESSURE: 68 MMHG | TEMPERATURE: 98.5 F | OXYGEN SATURATION: 96 % | WEIGHT: 147 LBS | HEIGHT: 64 IN | BODY MASS INDEX: 25.1 KG/M2 | SYSTOLIC BLOOD PRESSURE: 112 MMHG | RESPIRATION RATE: 14 BRPM

## 2024-09-16 DIAGNOSIS — E78.2 MIXED HYPERLIPIDEMIA: ICD-10-CM

## 2024-09-16 DIAGNOSIS — E03.9 HYPOTHYROIDISM, UNSPECIFIED TYPE: ICD-10-CM

## 2024-09-16 DIAGNOSIS — Z13.79 GENETIC SCREENING: ICD-10-CM

## 2024-09-16 DIAGNOSIS — M54.6 ACUTE MIDLINE THORACIC BACK PAIN: ICD-10-CM

## 2024-09-16 PROCEDURE — 3074F SYST BP LT 130 MM HG: CPT

## 2024-09-16 PROCEDURE — 3078F DIAST BP <80 MM HG: CPT

## 2024-09-16 PROCEDURE — 99214 OFFICE O/P EST MOD 30 MIN: CPT

## 2024-09-16 RX ORDER — LEVOTHYROXINE SODIUM 25 MCG
25 TABLET ORAL
Qty: 90 TABLET | Refills: 1 | Status: SHIPPED | OUTPATIENT
Start: 2024-09-16 | End: 2024-09-17

## 2024-09-16 RX ORDER — LEVOTHYROXINE SODIUM 25 MCG
25 TABLET ORAL
Qty: 90 TABLET | Refills: 1 | Status: SHIPPED | OUTPATIENT
Start: 2024-09-16 | End: 2024-09-16

## 2024-09-16 ASSESSMENT — FIBROSIS 4 INDEX: FIB4 SCORE: 0.74

## 2024-09-16 NOTE — PROGRESS NOTES
"Subjective:     Chief Complaint   Patient presents with    Back Pain     Mid lower back pain .     Lab Results     History of Present Illness  The patient is a 54-year-old female here to discuss lab results and back pain.    She has been diagnosed with Multiple Sclerosis (MS) and is currently on Aubagio treatment. She expresses interest in non-generic medication options and inquires about potential supplements that could aid her thyroid function.    She is experiencing back pain, which she attributes to washing her dog weekly. The pain is localized to the midline, specifically at the bra line. She reports no numbness or tingling sensations in her legs, no changes in sensation during personal hygiene activities, and no sharp pains radiating down the back of her legs. She plans to seek physical therapy if the pain persists. There have been instances where she felt immobilized upon standing up. She has been managing the pain with ibuprofen and Advil.    She reports no presence of blood in her urine or stool. Her diet primarily consists of chicken, with fish being consumed once a week. She maintains an active lifestyle, engaging in yoga for half an hour daily.    Allergies: Sulfa drugs  ROS per HPI  Health Maintenance: Completed   Objective:     /68   Pulse 98   Temp 36.9 °C (98.5 °F) (Temporal)   Resp 14   Ht 1.626 m (5' 4\")   Wt 66.7 kg (147 lb)   LMP 04/25/2018   SpO2 96%   BMI 25.23 kg/m²  Body mass index is 25.23 kg/m².     Physical Exam  Vitals reviewed.   Constitutional:       General: She is not in acute distress.     Appearance: Normal appearance. She is not ill-appearing.   Cardiovascular:      Rate and Rhythm: Normal rate and regular rhythm.   Pulmonary:      Effort: Pulmonary effort is normal. No respiratory distress.   Musculoskeletal:      Cervical back: Normal.      Thoracic back: Spasms and tenderness present. No swelling, edema, deformity, signs of trauma, lacerations or bony tenderness. " Decreased range of motion. No scoliosis.      Lumbar back: Normal.   Skin:     Coloration: Skin is not jaundiced or pale.   Neurological:      General: No focal deficit present.      Mental Status: She is alert and oriented to person, place, and time.   Psychiatric:         Mood and Affect: Mood normal.         Behavior: Behavior normal.         Thought Content: Thought content normal.         Judgment: Judgment normal.        Results  Laboratory Studies  White blood cell count is low. Red blood cell count is low. MCV is slightly elevated. Thyroid stimulating hormone is high. T4 is low. Cholesterol is high.    Results for orders placed or performed during the hospital encounter of 09/13/24   TSH WITH REFLEX TO FT4   Result Value Ref Range    TSH 10.300 (H) 0.380 - 5.330 uIU/mL   VITAMIN D,25 HYDROXY (DEFICIENCY)   Result Value Ref Range    25-Hydroxy   Vitamin D 25 70 30 - 100 ng/mL   HIV AG/AB COMBO ASSAY SCREENING   Result Value Ref Range    HIV Ag/Ab Combo Assay Non-Reactive Non Reactive   HEP C VIRUS ANTIBODY   Result Value Ref Range    Hepatitis C Antibody Non-Reactive Non-Reactive   Lipid Profile   Result Value Ref Range    Cholesterol,Tot 239 (H) 100 - 199 mg/dL    Triglycerides 52 0 - 149 mg/dL    HDL 81 >=40 mg/dL     (H) <100 mg/dL   Comp Metabolic Panel   Result Value Ref Range    Sodium 142 135 - 145 mmol/L    Potassium 4.2 3.6 - 5.5 mmol/L    Chloride 106 96 - 112 mmol/L    Co2 24 20 - 33 mmol/L    Anion Gap 12.0 7.0 - 16.0    Glucose 93 65 - 99 mg/dL    Bun 14 8 - 22 mg/dL    Creatinine 0.53 0.50 - 1.40 mg/dL    Calcium 9.3 8.5 - 10.5 mg/dL    Correct Calcium 8.9 8.5 - 10.5 mg/dL    AST(SGOT) 30 12 - 45 U/L    ALT(SGPT) 37 2 - 50 U/L    Alkaline Phosphatase 70 30 - 99 U/L    Total Bilirubin 0.3 0.1 - 1.5 mg/dL    Albumin 4.5 3.2 - 4.9 g/dL    Total Protein 7.0 6.0 - 8.2 g/dL    Globulin 2.5 1.9 - 3.5 g/dL    A-G Ratio 1.8 g/dL   CBC WITH DIFFERENTIAL   Result Value Ref Range    WBC 4.2 (L) 4.8 -  10.8 K/uL    RBC 4.08 (L) 4.20 - 5.40 M/uL    Hemoglobin 13.3 12.0 - 16.0 g/dL    Hematocrit 40.8 37.0 - 47.0 %    .0 (H) 81.4 - 97.8 fL    MCH 32.6 27.0 - 33.0 pg    MCHC 32.6 32.2 - 35.5 g/dL    RDW 46.8 35.9 - 50.0 fL    Platelet Count 358 164 - 446 K/uL    MPV 9.9 9.0 - 12.9 fL    Neutrophils-Polys 36.00 (L) 44.00 - 72.00 %    Lymphocytes 47.50 (H) 22.00 - 41.00 %    Monocytes 10.20 0.00 - 13.40 %    Eosinophils 4.70 0.00 - 6.90 %    Basophils 1.40 0.00 - 1.80 %    Immature Granulocytes 0.20 0.00 - 0.90 %    Nucleated RBC 0.00 0.00 - 0.20 /100 WBC    Neutrophils (Absolute) 1.52 (L) 1.82 - 7.42 K/uL    Lymphs (Absolute) 2.01 1.00 - 4.80 K/uL    Monos (Absolute) 0.43 0.00 - 0.85 K/uL    Eos (Absolute) 0.20 0.00 - 0.51 K/uL    Baso (Absolute) 0.06 0.00 - 0.12 K/uL    Immature Granulocytes (abs) 0.01 0.00 - 0.11 K/uL    NRBC (Absolute) 0.00 K/uL   FASTING STATUS   Result Value Ref Range    Fasting Status Fasting    ESTIMATED GFR   Result Value Ref Range    GFR (CKD-EPI) 110 >60 mL/min/1.73 m 2   FREE THYROXINE   Result Value Ref Range    Free T-4 0.73 (L) 0.93 - 1.70 ng/dL      Assessment and Plan:     The following treatment plan was discussed through shared decision making with the patient:    1. Hypothyroidism, unspecified type  TSH+FREE T4    THYROID PEROXIDASE  (TPO) AB    SYNTHROID 25 MCG Tab    DISCONTINUED: SYNTHROID 25 MCG Tab      2. Mixed hyperlipidemia  Referral to Genetic Research Studies    CT-CARDIAC SCORING      3. Genetic screening  Referral to Genetic Research Studies      4. Acute midline thoracic back pain  tizanidine (ZANAFLEX) 4 MG Tab        Assessment & Plan  Her thyroid-stimulating hormone (TSH) levels are elevated, and her T4 levels are low, indicating potential hypothyroidism. A prescription for Synthroid 25 mcg has been provided, to be taken daily. She has been advised to follow a gluten-free diet. Lab work will be ordered in 3 months to monitor her response to the medication. TPO  antibodies will also be checked at that time to determine if the hypothyroidism is autoimmune in nature.    Her cholesterol levels remain elevated, with a slight increase noted since her last panel in 2023. However, her triglyceride levels are within normal range. She has been advised to reduce her intake of saturated fatty foods and increase physical activity. A referral for genetic research studies has been made to rule out familial hypercholesterolemia. A CT cardiac score has been ordered to assess her risk of heart attack and stroke. She will call to schedule the CT cardiac score and confirm the cost.    She is experiencing acute thoracic back pain without any signs of sciatica. A prescription for tizanidine has been provided, with instructions to take it at bedtime initially to monitor for dizziness. She has been advised against consuming alcohol while on this medication. She will consider physical therapy if the pain does not resolve.    Return in about 3 months (around 12/16/2024), or if symptoms worsen or fail to improve, for Labs, Med Check.         Please note that this note was created using dictation with voice recognition software. I have made every reasonable attempt to correct obvious errors, but I expect that there are errors of grammar and possibly content that I did not discover before finalizing the note.    JESUS Watts  [facility]  [facility]

## 2024-09-17 DIAGNOSIS — E03.9 HYPOTHYROIDISM, UNSPECIFIED TYPE: ICD-10-CM

## 2024-09-17 RX ORDER — LEVOTHYROXINE SODIUM 25 UG/1
25 TABLET ORAL
Qty: 90 TABLET | Refills: 0 | Status: SHIPPED | OUTPATIENT
Start: 2024-09-17 | End: 2024-09-17

## 2024-09-17 RX ORDER — LEVOTHYROXINE SODIUM 25 UG/1
25 TABLET ORAL
Qty: 90 TABLET | Refills: 0 | Status: SHIPPED | OUTPATIENT
Start: 2024-09-17

## 2024-09-30 ENCOUNTER — HOSPITAL ENCOUNTER (OUTPATIENT)
Dept: RADIOLOGY | Facility: MEDICAL CENTER | Age: 54
End: 2024-09-30
Payer: COMMERCIAL

## 2024-09-30 DIAGNOSIS — E78.2 MIXED HYPERLIPIDEMIA: ICD-10-CM

## 2024-09-30 PROCEDURE — 4410556 CT-CARDIAC SCORING (SELF PAY ONLY)

## 2024-10-10 ENCOUNTER — PATIENT MESSAGE (OUTPATIENT)
Dept: MEDICAL GROUP | Facility: PHYSICIAN GROUP | Age: 54
End: 2024-10-10
Payer: COMMERCIAL

## 2024-10-10 RX ORDER — ESTRADIOL 0.1 MG/G
CREAM VAGINAL
Qty: 42.5 G | Refills: 3 | Status: SHIPPED | OUTPATIENT
Start: 2024-10-10 | End: 2024-10-16 | Stop reason: SDUPTHER

## 2024-10-15 ENCOUNTER — PATIENT MESSAGE (OUTPATIENT)
Dept: MEDICAL GROUP | Facility: PHYSICIAN GROUP | Age: 54
End: 2024-10-15
Payer: COMMERCIAL

## 2024-10-16 RX ORDER — ESTRADIOL 0.1 MG/G
CREAM VAGINAL
Qty: 42.5 G | Refills: 3 | Status: SHIPPED | OUTPATIENT
Start: 2024-10-16

## 2024-12-02 ENCOUNTER — HOSPITAL ENCOUNTER (OUTPATIENT)
Dept: RADIOLOGY | Facility: MEDICAL CENTER | Age: 54
End: 2024-12-02
Attending: OBSTETRICS & GYNECOLOGY
Payer: COMMERCIAL

## 2024-12-02 DIAGNOSIS — Z12.31 SCREENING MAMMOGRAM, ENCOUNTER FOR: ICD-10-CM

## 2024-12-02 PROCEDURE — 77067 SCR MAMMO BI INCL CAD: CPT

## 2024-12-03 DIAGNOSIS — G43.009 MIGRAINE WITHOUT AURA AND WITHOUT STATUS MIGRAINOSUS, NOT INTRACTABLE: ICD-10-CM

## 2024-12-03 DIAGNOSIS — G35 MS (MULTIPLE SCLEROSIS) (HCC): ICD-10-CM

## 2024-12-03 RX ORDER — RIZATRIPTAN BENZOATE 10 MG/1
TABLET ORAL
Qty: 10 TABLET | Refills: 11 | Status: SHIPPED | OUTPATIENT
Start: 2024-12-03

## 2024-12-03 RX ORDER — TERIFLUNOMIDE 14 MG/1
TABLET, FILM COATED ORAL
Qty: 30 TABLET | Refills: 11 | Status: SHIPPED | OUTPATIENT
Start: 2024-12-03

## 2024-12-03 NOTE — TELEPHONE ENCOUNTER
Received request via: Pharmacy    Medication Name/Dosage Rizatriptan     When was medication last prescribed 11/30/23    How many refills were previously provided 11    How many Refills does he patient have left from last prescription 0    Was the patient seen in the last year in this department? Yes   Date of last office visit 6/24/24     Per last Neurology Office Visit, when was the date of next follow up visit set for?                  1 yr           Date of office visit follow up request 6/24/25     Does the patient have an upcoming appointment? Yes   If yes, when 6/6/25             If no, schedule appointment     Does the patient have [facility] and need 100 day supply (blood pressure, diabetes and cholesterol meds only)? Medication is not for blood pressure, diabetes, or cholesterol

## 2024-12-03 NOTE — TELEPHONE ENCOUNTER
Received request via: Pharmacy    Medication Name/Dosage Teriflunomide     When was medication last prescribed 1/16/24    How many refills were previously provided 11    How many Refills does he patient have left from last prescription 0    Was the patient seen in the last year in this department? Yes   Date of last office visit 6/24/24     Per last Neurology Office Visit, when was the date of next follow up visit set for?                          1 yr   Date of office visit follow up request 6/24/25     Does the patient have an upcoming appointment? Yes   If yes, when 6/6/25             If no, schedule appointment     Does the patient have [facility] and need 100 day supply (blood pressure, diabetes and cholesterol meds only)? Medication is not for blood pressure, diabetes, or cholesterol

## 2025-01-06 DIAGNOSIS — E03.9 HYPOTHYROIDISM, UNSPECIFIED TYPE: ICD-10-CM

## 2025-01-06 NOTE — TELEPHONE ENCOUNTER
Received request via: Pharmacy    Was the patient seen in the last year in this department? Yes    Does the patient have an active prescription (recently filled or refills available) for medication(s) requested? No    Pharmacy Name: [facility]/pharmacy #[id number] - [place], [place] - [address] [place] [facility].     Does the patient have [facility] Plus and need 100-day supply? (This applies to ALL medications) Patient does not have SCP

## 2025-01-07 RX ORDER — LEVOTHYROXINE SODIUM 25 UG/1
25 TABLET ORAL
Qty: 90 TABLET | Refills: 0 | Status: SHIPPED | OUTPATIENT
Start: 2025-01-07

## 2025-03-06 DIAGNOSIS — G35 MULTIPLE SCLEROSIS (HCC): Chronic | ICD-10-CM

## 2025-03-06 RX ORDER — MODAFINIL 200 MG/1
TABLET ORAL
Qty: 30 TABLET | Refills: 0 | Status: SHIPPED | OUTPATIENT
Start: 2025-03-06 | End: 2025-09-03

## 2025-03-07 NOTE — TELEPHONE ENCOUNTER
Received request via: Pharmacy    Medication Name/Dosage Modafinil     When was medication last prescribed 9/5/24    How many refills were previously provided 5     How many Refills does he patient have left from last prescription 0    Was the patient seen in the last year in this department? Yes   Date of last office visit 6/24/24     Per last Neurology Office Visit, when was the date of next follow up visit set for?                          1 yr   Date of office visit follow up request 6/24/25     Does the patient have an upcoming appointment? Yes   If yes, when 6/6/25             If no, schedule appointment     Does the patient have [facility] and need 100 day supply (blood pressure, diabetes and cholesterol meds only)? Medication is not for blood pressure,diabetes,or cholesterol

## 2025-04-08 ENCOUNTER — HOSPITAL ENCOUNTER (OUTPATIENT)
Dept: LAB | Facility: MEDICAL CENTER | Age: 55
End: 2025-04-08
Payer: COMMERCIAL

## 2025-04-08 DIAGNOSIS — E03.9 HYPOTHYROIDISM, UNSPECIFIED TYPE: ICD-10-CM

## 2025-04-08 LAB
T4 FREE SERPL-MCNC: 1.11 NG/DL (ref 0.93–1.7)
THYROPEROXIDASE AB SERPL-ACNC: 151 IU/ML (ref 0–9)
TSH SERPL-ACNC: 2.89 UIU/ML (ref 0.38–5.33)

## 2025-04-08 PROCEDURE — 84439 ASSAY OF FREE THYROXINE: CPT

## 2025-04-08 PROCEDURE — 36415 COLL VENOUS BLD VENIPUNCTURE: CPT

## 2025-04-08 PROCEDURE — 86376 MICROSOMAL ANTIBODY EACH: CPT

## 2025-04-08 PROCEDURE — 84443 ASSAY THYROID STIM HORMONE: CPT

## 2025-04-09 DIAGNOSIS — E03.9 HYPOTHYROIDISM, UNSPECIFIED TYPE: ICD-10-CM

## 2025-04-09 RX ORDER — LEVOTHYROXINE SODIUM 25 UG/1
25 TABLET ORAL
Qty: 90 TABLET | Refills: 0 | Status: SHIPPED | OUTPATIENT
Start: 2025-04-09

## 2025-04-09 NOTE — TELEPHONE ENCOUNTER
Received request via: Pharmacy    Was the patient seen in the last year in this department? Yes    Does the patient have an active prescription (recently filled or refills available) for medication(s) requested? No    Pharmacy Name:[facility]/pharmacy #[id number] - [place], [place] - [address] [place] [facility].     Does the patient have [facility] Plus and need 100-day supply? (This applies to ALL medications) Patient does not have SCP

## 2025-04-10 ENCOUNTER — RESULTS FOLLOW-UP (OUTPATIENT)
Dept: MEDICAL GROUP | Facility: PHYSICIAN GROUP | Age: 55
End: 2025-04-10

## 2025-04-16 DIAGNOSIS — G35 MULTIPLE SCLEROSIS (HCC): Chronic | ICD-10-CM

## 2025-04-17 NOTE — TELEPHONE ENCOUNTER
Received request via: Pharmacy    Medication Name/Dosage Modafinil     When was medication last prescribed 3/6/25    How many refills were previously provided 0    How many Refills does he patient have left from last prescription 0    Was the patient seen in the last year in this department? Yes   Date of last office visit 6/24/24     Per last Neurology Office Visit, when was the date of next follow up visit set for?                          1 yr   Date of office visit follow up request 6/24/25     Does the patient have an upcoming appointment? Yes   If yes, when 6/6/25             If no, schedule appointment     Does the patient have [facility] Plus and need 100 day supply (blood pressure, diabetes and cholesterol meds only)? Medication is not for blood pressure,diabetes,or cholesterol

## 2025-04-18 ENCOUNTER — TELEPHONE (OUTPATIENT)
Dept: PHARMACY | Facility: MEDICAL CENTER | Age: 55
End: 2025-04-18
Payer: COMMERCIAL

## 2025-04-18 RX ORDER — MODAFINIL 200 MG/1
TABLET ORAL
Qty: 30 TABLET | Refills: 0 | Status: SHIPPED | OUTPATIENT
Start: 2025-04-18 | End: 2025-10-16

## 2025-04-18 NOTE — TELEPHONE ENCOUNTER
Received New Start request via MSOT  for   modafinil (PROVIGIL) 200 MG Tab. (Quantity:30, Day Supply:30)     Insurance: [email or web address]  Member ID:  [id number]  BIN: [id number]  PCN: [id number]  Group: [id number]     Ran Test claim via [place] & medication Pays for a $7 copay. Will outreach to patient to offer specialty pharmacy services and or release to preferred pharmacy    Will release to pt's preferred pharmacy on file.

## 2025-05-04 ENCOUNTER — PATIENT MESSAGE (OUTPATIENT)
Dept: MEDICAL GROUP | Facility: PHYSICIAN GROUP | Age: 55
End: 2025-05-04
Payer: COMMERCIAL

## 2025-05-04 DIAGNOSIS — N30.10 CHRONIC INTERSTITIAL CYSTITIS: ICD-10-CM

## 2025-05-04 DIAGNOSIS — B00.1 HERPES LABIALIS: ICD-10-CM

## 2025-05-05 NOTE — PATIENT COMMUNICATION
Received request via: Patient    Was the patient seen in the last year in this department? Yes    Does the patient have an active prescription (recently filled or refills available) for medication(s) requested? No    Pharmacy Name: [facility] - [place] - [address]     Does the patient have [facility] Plus and need 100-day supply? (This applies to ALL medications) Patient does not have SCP

## 2025-05-06 RX ORDER — ACYCLOVIR 400 MG/1
400 TABLET ORAL 2 TIMES DAILY
Qty: 180 TABLET | Refills: 3 | Status: SHIPPED | OUTPATIENT
Start: 2025-05-06 | End: 2025-05-13 | Stop reason: SDUPTHER

## 2025-05-12 ENCOUNTER — PATIENT MESSAGE (OUTPATIENT)
Dept: MEDICAL GROUP | Facility: PHYSICIAN GROUP | Age: 55
End: 2025-05-12
Payer: COMMERCIAL

## 2025-05-12 ENCOUNTER — PATIENT MESSAGE (OUTPATIENT)
Dept: NEUROLOGY | Facility: MEDICAL CENTER | Age: 55
End: 2025-05-12
Payer: COMMERCIAL

## 2025-05-12 DIAGNOSIS — G35 MULTIPLE SCLEROSIS (HCC): Chronic | ICD-10-CM

## 2025-05-12 DIAGNOSIS — B00.1 HERPES LABIALIS: ICD-10-CM

## 2025-05-12 RX ORDER — MODAFINIL 200 MG/1
200 TABLET ORAL DAILY
Qty: 30 TABLET | Refills: 5 | Status: SHIPPED | OUTPATIENT
Start: 2025-05-12 | End: 2025-11-08

## 2025-05-12 NOTE — TELEPHONE ENCOUNTER
Received request via: Patient    Medication Name/Dosage Modafinil 200mg     When was medication last prescribed 4/18/25    How many refills were previously provided 0    How many Refills does he patient have left from last prescription 0    Was the patient seen in the last year in this department? Yes   Date of last office visit 6/24/24     Per last Neurology Office Visit, when was the date of next follow up visit set for?                            Date of office visit follow up request 6/24/25     Does the patient have an upcoming appointment? Yes   If yes, when 6/6/25             If no, schedule appointment -    Does the patient have [facility] Plus and need 100 day supply (blood pressure, diabetes and cholesterol meds only)? Patient does not have SCP

## 2025-05-13 ENCOUNTER — TELEPHONE (OUTPATIENT)
Dept: PHARMACY | Facility: MEDICAL CENTER | Age: 55
End: 2025-05-13
Payer: COMMERCIAL

## 2025-05-13 RX ORDER — ACYCLOVIR 400 MG/1
400 TABLET ORAL 2 TIMES DAILY
Qty: 180 TABLET | Refills: 3 | Status: SHIPPED | OUTPATIENT
Start: 2025-05-13 | End: 2026-05-13

## 2025-05-13 NOTE — TELEPHONE ENCOUNTER
Received New Start PA request via MSOT  for   Teriflunomide 14 MG Tab. (Quantity:30, Day Supply:30)     Insurance: [employer]  Member ID:  [id number]  BIN: [id number]  PCN: [id number]  Group: [id number]     Ran Test claim via [place] & medication Rejects stating prior authorization is required.

## 2025-05-13 NOTE — TELEPHONE ENCOUNTER
Prior Authorization for Teriflunomide 14 MG Tab  (Quantity: 30, Days: 30) has been submitted via Cover My Meds: Key ([id number])    Insurance: [employer]    Will follow up in 24-48 business hours.

## 2025-05-13 NOTE — TELEPHONE ENCOUNTER
Prior Authorization for Teriflunomide 14 MG Tab  has been approved for a quantity of 30 , day supply 30    Prior Authorization reference number: [id number]  Insurance: [employer]  Effective dates: 05/13/25 to 5/13/26  Copay: $Unknown - can't fill at the [place] pharmacy.     Is patient eligible to fill with [facility]? No, test claim rejected stating Can't fill at the [facility] pharmacy.    Next Steps:  Uknown can't fill at the [facility] pharmacy.

## 2025-05-13 NOTE — TELEPHONE ENCOUNTER
Received New Start request via MSOT  for   modafinil (PROVIGIL) 200 MG Tab. (Quantity:30, Day Supply:30)     Insurance: [employer]  Member ID:  [id number]  BIN: [id number]  PCN: [id number]  Group: [id number]     Ran Test claim via [place] & medication Pays for a $7 copay. Will outreach to patient to offer specialty pharmacy services and or release to preferred pharmacy    Will release to pt's preferred pharmacy on file.

## 2025-06-02 ENCOUNTER — APPOINTMENT (OUTPATIENT)
Dept: MEDICAL GROUP | Facility: PHYSICIAN GROUP | Age: 55
End: 2025-06-02
Payer: COMMERCIAL

## 2025-06-05 RX ORDER — MOMETASONE FUROATE MONOHYDRATE 50 UG/1
SPRAY, METERED NASAL
Qty: 17 G | Refills: 3 | Status: SHIPPED | OUTPATIENT
Start: 2025-06-05 | End: 2025-06-05 | Stop reason: SDUPTHER

## 2025-06-05 RX ORDER — MOMETASONE FUROATE MONOHYDRATE 50 UG/1
SPRAY, METERED NASAL
Qty: 17 G | Refills: 3 | Status: SHIPPED | OUTPATIENT
Start: 2025-06-05

## 2025-06-05 NOTE — TELEPHONE ENCOUNTER
Received request via: Pharmacy    Was the patient seen in the last year in this department? Yes    Does the patient have an active prescription (recently filled or refills available) for medication(s) requested? No    Pharmacy Name: [facility] - [place] - [address]     Does the patient have assisted Plus and need 100-day supply? (This applies to ALL medications) Patient does not have SCP

## 2025-06-06 ENCOUNTER — TELEPHONE (OUTPATIENT)
Dept: PHARMACY | Facility: MEDICAL CENTER | Age: 55
End: 2025-06-06
Payer: COMMERCIAL

## 2025-06-06 ENCOUNTER — OFFICE VISIT (OUTPATIENT)
Dept: NEUROLOGY | Facility: MEDICAL CENTER | Age: 55
End: 2025-06-06
Attending: PSYCHIATRY & NEUROLOGY
Payer: COMMERCIAL

## 2025-06-06 VITALS
DIASTOLIC BLOOD PRESSURE: 60 MMHG | BODY MASS INDEX: 23.74 KG/M2 | OXYGEN SATURATION: 95 % | SYSTOLIC BLOOD PRESSURE: 104 MMHG | RESPIRATION RATE: 16 BRPM | HEIGHT: 66 IN | HEART RATE: 71 BPM | TEMPERATURE: 97.8 F | WEIGHT: 147.71 LBS

## 2025-06-06 DIAGNOSIS — G35 MS (MULTIPLE SCLEROSIS) (HCC): Primary | ICD-10-CM

## 2025-06-06 DIAGNOSIS — G43.009 MIGRAINE WITHOUT AURA AND WITHOUT STATUS MIGRAINOSUS, NOT INTRACTABLE: ICD-10-CM

## 2025-06-06 DIAGNOSIS — G35 MULTIPLE SCLEROSIS (HCC): Chronic | ICD-10-CM

## 2025-06-06 PROCEDURE — 3074F SYST BP LT 130 MM HG: CPT | Performed by: PSYCHIATRY & NEUROLOGY

## 2025-06-06 PROCEDURE — 99215 OFFICE O/P EST HI 40 MIN: CPT | Performed by: PSYCHIATRY & NEUROLOGY

## 2025-06-06 PROCEDURE — 3078F DIAST BP <80 MM HG: CPT | Performed by: PSYCHIATRY & NEUROLOGY

## 2025-06-06 PROCEDURE — 99214 OFFICE O/P EST MOD 30 MIN: CPT | Performed by: PSYCHIATRY & NEUROLOGY

## 2025-06-06 RX ORDER — MODAFINIL 200 MG/1
100 TABLET ORAL 2 TIMES DAILY
Qty: 90 TABLET | Refills: 1 | Status: SHIPPED | OUTPATIENT
Start: 2025-06-06 | End: 2025-06-18 | Stop reason: SDUPTHER

## 2025-06-06 RX ORDER — TERIFLUNOMIDE 14 MG/1
14 TABLET, FILM COATED ORAL DAILY
Qty: 90 TABLET | Refills: 3 | Status: SHIPPED | OUTPATIENT
Start: 2025-06-06 | End: 2025-06-09 | Stop reason: SDUPTHER

## 2025-06-06 ASSESSMENT — FIBROSIS 4 INDEX: FIB4 SCORE: 0.74

## 2025-06-06 ASSESSMENT — ENCOUNTER SYMPTOMS
HEADACHES: 1
SENSORY CHANGE: 0
FOCAL WEAKNESS: 0
CONSTIPATION: 0
DEPRESSION: 1
TINGLING: 0
FALLS: 0
MEMORY LOSS: 0
INSOMNIA: 0
HALLUCINATIONS: 0
SPEECH CHANGE: 0

## 2025-06-06 ASSESSMENT — PATIENT HEALTH QUESTIONNAIRE - PHQ9: CLINICAL INTERPRETATION OF PHQ2 SCORE: 0

## 2025-06-06 NOTE — ASSESSMENT & PLAN NOTE
Clinically, her exam is unchanged, but disease has been quiescent.  The fatigue is a continuing issue for her, possibly a little bit worse at times, but it certainly will play a role into the depressive symptoms that she experiences.  Her thyroid condition might of contributed to a degree, but now that things have been corrected on blood test, and since the abnormalities were detected almost 6 months ago, with recent values normalized, I think the thyroid condition is less of an issue.  I did recommend speaking with a therapist, she will think about it.  I am uncomfortable simply prescribing medication in this circumstance.    Otherwise, we continue the teriflunomide.  CBC and liver profiles are checked annually, they have been stable to date.  There is no reason to repeat imaging given how stable she has been over the last several years, next year, in June, would be the appropriate time.  She knows to contact the office for anything that might suggest disease activity.  1 thing she was offered is an increased dose of Provigil, taking an additional 0.5-1 tablet on an as-needed basis if her fatigue and malaise were to increase on a more sustained basis.  She will continue the 0.5 tablet twice daily regimen with the 200 mg tablets.    Orders:    modafinil (PROVIGIL) 200 MG Tab; Take 0.5 Tablets by mouth 2 times a day for 180 days.

## 2025-06-06 NOTE — TELEPHONE ENCOUNTER
Received New Start  request via MSOT  for Teriflunomide 14 MG Tab . (Quantity:30, Day Supply:30)     Insurance: [employer]  Member ID:  [id number]  BIN: [id number]  PCN: [id number]  Group: [id number]     Ran Test claim via [place] & medication RTC can't fill at the [facility] pharmacy. Going to release to pt's preferred pharmacy on file. Pt has already declined services.

## 2025-06-06 NOTE — PROGRESS NOTES
Subjective     Georgiana Montalvo is a 54 y.o. female who presents with her  Hemanth, for annual follow-up, with a history of MS.  Since last seen though there has been some issue with fluctuating fatigue and malaise and depressive symptomatology.    HPI    In general, Georgiana states that her disease has been stable.  She has not had any signs or symptoms suggestive of relapse.  Baseline cognition remains intact, visual functions normal, there is been no diplopia or monocular visual loss.  Facial movements have not been distorted, no sensory abnormalities.  There has been no slurring of speech or difficulty swallowing.  She denies focal weakness, loss of strength and dexterity with the hands, foot drop with frequent tripping, postural instability with frequent falls, changes in bowel or bladder control, and no sustained sensory distortions in the feet or hands.  She no longer has the belt-like constrictive sensations underneath the bra line.  Lhermitte phenomena has never been an issue.    Last seen in June, 2024, she had just undergone MRI scan with and without contrast of the brain and thoracic cord, the latter study revealing resolution of the T7/8 lesion, in the brain the burden of disease was stable, no evidence of advanced atrophy or inflammation.  In September, 2024, CBC was still remarkable for slight leukopenia 4.2, ALC still normal at 2.01, and normal platelet and H/H values.  MCV was still elevated at 100.  CMP was normal.  What was found to be abnormal was an elevated TSH of 10.3, now on Synthroid, her last level from April, 2025 is 2.89, free T4 normalized at 1.11, up from 0.73.    Throughout this time she has continued to have issues with fatigue and malaise, on Provigil 100 mg twice daily (previously and incorrectly documented in the record as 200 mg daily), she has noted more severe fluctuations.  On a couple of occasions she actually had to miss events, one of them was a holiday in [place], because of  "the fatigue and cognitive impairments.  Mood has also been more labile, though she denies suicidal thoughts and the like, she has simply felt more overwhelmed and depressed on occasion.  This is never long-lasting, typically a week or so, nothing that has totally incapacitated.  Hemanth has been more aware of this now that he has retired and has been at home 24/7.    She remains on teriflunomide 14 mg daily with good tolerability.    Medical, surgical and family histories are reviewed, there are no new drug allergies.  Her migraines have been stable, these were not directly addressed today though historically infrequent, she has Zomig 5 mg nasal spray to use alternating with Maxalt 10 mg tablets.  Occasionally she may mix both of them, the second drug following an inadequate response to the first, but the response is always consistent.    She is on teriflunomide 14 mg daily, Provigil 100 mg twice daily, Zovirax, Synthroid, Elmiron and Pyridium, Zomig 5 mg as needed, Maxalt 10 mg as needed, Elavil 25 mg nightly, Estrace vaginal cream, vitamin D with calcium and several supplements.    Review of Systems   Constitutional:  Positive for malaise/fatigue.   Gastrointestinal:  Negative for constipation.   Genitourinary:  Negative for dysuria.   Musculoskeletal:  Negative for falls.   Neurological:  Positive for headaches. Negative for tingling, sensory change, speech change and focal weakness.   Psychiatric/Behavioral:  Positive for depression. Negative for hallucinations, memory loss and suicidal ideas. The patient does not have insomnia.    All other systems reviewed and are negative.    Objective     /60 (BP Location: Left arm, Patient Position: Sitting, BP Cuff Size: Adult)   Pulse 71   Temp 36.6 °C (97.8 °F) (Temporal)   Resp 16   Ht 1.676 m (5' 6\")   Wt 67 kg (147 lb 11.3 oz)   LMP 04/25/2018   SpO2 95%   BMI 23.84 kg/m²      Physical Exam    She appears in no acute distress.  Vital signs are stable.  There " is no malar rash, jaw or temporal tenderness, or jaw claudication.  Her neck is supple, range of motion is full, compression maneuvers are negative, Lhermitte phenomena is absent.  Carotid pulses are present.  Straight leg raising is negative bilaterally.     Neurological Exam    Fully oriented, there is no aphasia, agnosia, apraxia, or inattention.  Her mood is euthymic, affect mood appropriate, she does become tearful at times during the interview when questions are asked about her present circumstance, depressive symptomatology, etc.    PERRLA/EOMI, visual fields are full to finger counting confrontation bilaterally.  Funduscopic exam OD reveals a sharp disc margin and no clear pallor.  Facial movements are symmetric, sensory exam is intact to temperature and light touch bilaterally.  There is no extinction.  The tongue and uvula are midline, there is no bulbar dysfunction, jaw movements are intact.  Shoulder shrug and head rotation are symmetric.    Musculoskeletal exam reveals normal tone bilaterally, there is no tremor, asterixis, or drift.  Strength is 5/5 throughout.  Reflexes are brisk but present at all points without asymmetries from side-to-side, no reflexes are dropped.  The toes are downgoing bilaterally.    She is observed to walk in steady fashion, armswing is symmetric.  Stride length is maintained.  Heel, toe, and tandem walking are all normal.  There is no appendicular dystaxia.  Fine motor control with repetitive movements also is normal with symmetric amplitude and frequencies.    Sensory exam is intact to vibration and temperature, Romberg is absent.  Assessment & Plan  MS (multiple sclerosis) (HCC)  Clinically, her exam is unchanged, but disease has been quiescent.  The fatigue is a continuing issue for her, possibly a little bit worse at times, but it certainly will play a role into the depressive symptoms that she experiences.  Her thyroid condition might of contributed to a degree, but now  that things have been corrected on blood test, and since the abnormalities were detected almost 6 months ago, with recent values normalized, I think the thyroid condition is less of an issue.  I did recommend speaking with a therapist, she will think about it.  I am uncomfortable simply prescribing medication in this circumstance.    Otherwise, we continue the teriflunomide.  CBC and liver profiles are checked annually, they have been stable to date.  There is no reason to repeat imaging given how stable she has been over the last several years, next year, in June, would be the appropriate time.  She knows to contact the office for anything that might suggest disease activity.  1 thing she was offered is an increased dose of Provigil, taking an additional 0.5-1 tablet on an as-needed basis if her fatigue and malaise were to increase on a more sustained basis.  She will continue the 0.5 tablet twice daily regimen with the 200 mg tablets.    Orders:    modafinil (PROVIGIL) 200 MG Tab; Take 0.5 Tablets by mouth 2 times a day for 180 days.    Migraine without aura and without status migrainosus, not intractable  Stable, she is not overusing the Zomig nasal spray or the Maxalt tablets.    A 1 year follow-up is scheduled.    Time: 40 minutes in total spent on patient care including pre-charting, record review, discussion with healthcare staff and documentation.  This includes face-to-face time for exam, review, discussion, as well as counseling and coordinating care.

## 2025-06-06 NOTE — TELEPHONE ENCOUNTER
Received New Start  request via MSOT  for   modafinil (PROVIGIL) 200 MG Tab. (Quantity:30, Day Supply:30)     Insurance: [employer]  Member ID:  [id number]  BIN: [id number]  PCN: [id number]  Group: [id number]     Ran Test claim via [place] & medication RTC RTS - will release to pt's preferred pharmacy.

## 2025-06-07 ENCOUNTER — PATIENT MESSAGE (OUTPATIENT)
Dept: MEDICAL GROUP | Facility: PHYSICIAN GROUP | Age: 55
End: 2025-06-07
Payer: COMMERCIAL

## 2025-06-08 NOTE — PATIENT COMMUNICATION
Received request via: Patient    Was the patient seen in the last year in this department? Yes    Does the patient have an active prescription (recently filled or refills available) for medication(s) requested? No    Pharmacy Name: [facility] - [place], [place] - [facility] [address]      Does the patient have longterm Plus and need 100-day supply? (This applies to ALL medications) Patient does not have SCP

## 2025-06-09 DIAGNOSIS — G35 MS (MULTIPLE SCLEROSIS) (HCC): ICD-10-CM

## 2025-06-09 RX ORDER — MONTELUKAST SODIUM 10 MG/1
TABLET ORAL
Qty: 90 TABLET | Refills: 3 | Status: SHIPPED | OUTPATIENT
Start: 2025-06-09

## 2025-06-09 RX ORDER — TERIFLUNOMIDE 14 MG/1
14 TABLET, FILM COATED ORAL DAILY
Qty: 30 TABLET | Refills: 11 | Status: SHIPPED | OUTPATIENT
Start: 2025-06-09

## 2025-06-18 DIAGNOSIS — G35 MULTIPLE SCLEROSIS (HCC): Chronic | ICD-10-CM

## 2025-06-18 DIAGNOSIS — N30.10 CHRONIC INTERSTITIAL CYSTITIS: ICD-10-CM

## 2025-06-18 RX ORDER — MODAFINIL 200 MG/1
200 TABLET ORAL 2 TIMES DAILY
Qty: 60 TABLET | Refills: 1 | Status: SHIPPED | OUTPATIENT
Start: 2025-06-18 | End: 2025-08-17

## 2025-06-24 RX ORDER — ESTRADIOL 0.1 MG/G
CREAM VAGINAL
Qty: 42.5 G | Refills: 3 | Status: SHIPPED | OUTPATIENT
Start: 2025-06-24

## 2025-06-24 NOTE — TELEPHONE ENCOUNTER
Received request via: Pharmacy    Was the patient seen in the last year in this department? Yes    Does the patient have an active prescription (recently filled or refills available) for medication(s) requested? No    Pharmacy Name: [facility] - [place], [place] - [facility] [address]     Does the patient have [facility] Plus and need 100-day supply? (This applies to ALL medications) Patient does not have SCP

## 2025-07-01 DIAGNOSIS — G35 MULTIPLE SCLEROSIS (HCC): Chronic | ICD-10-CM

## 2025-07-01 RX ORDER — MODAFINIL 200 MG/1
200 TABLET ORAL 2 TIMES DAILY
Qty: 60 TABLET | Refills: 1 | Status: SHIPPED | OUTPATIENT
Start: 2025-07-01 | End: 2025-07-10 | Stop reason: SDUPTHER

## 2025-07-06 DIAGNOSIS — E03.9 HYPOTHYROIDISM, UNSPECIFIED TYPE: ICD-10-CM

## 2025-07-07 ENCOUNTER — PATIENT MESSAGE (OUTPATIENT)
Dept: MEDICAL GROUP | Facility: PHYSICIAN GROUP | Age: 55
End: 2025-07-07
Payer: COMMERCIAL

## 2025-07-07 DIAGNOSIS — E03.9 HYPOTHYROIDISM, UNSPECIFIED TYPE: ICD-10-CM

## 2025-07-07 RX ORDER — LEVOTHYROXINE SODIUM 25 UG/1
25 TABLET ORAL
Qty: 90 TABLET | Refills: 0 | Status: SHIPPED | OUTPATIENT
Start: 2025-07-07

## 2025-07-07 RX ORDER — LEVOTHYROXINE SODIUM 25 UG/1
25 TABLET ORAL
Qty: 90 TABLET | Refills: 0 | Status: SHIPPED | OUTPATIENT
Start: 2025-07-07 | End: 2025-07-07 | Stop reason: SDUPTHER

## 2025-07-07 NOTE — TELEPHONE ENCOUNTER
Received request via: Pharmacy    Was the patient seen in the last year in this department? Yes    Does the patient have an active prescription (recently filled or refills available) for medication(s) requested? No    Pharmacy Name: [facility] #[id number] - [place], [place] - [address] [place] [address]     Does the patient have [facility] Plus and need 100-day supply? (This applies to ALL medications) Patient does not have SCP

## 2025-07-07 NOTE — PATIENT COMMUNICATION
Please send to   [facility] - [place], [place] - [address]

## 2025-07-10 DIAGNOSIS — G35 MULTIPLE SCLEROSIS (HCC): Chronic | ICD-10-CM

## 2025-07-10 RX ORDER — MODAFINIL 200 MG/1
200 TABLET ORAL 2 TIMES DAILY
Qty: 60 TABLET | Refills: 1 | Status: SHIPPED | OUTPATIENT
Start: 2025-07-10 | End: 2025-09-08

## 2025-07-14 ENCOUNTER — OFFICE VISIT (OUTPATIENT)
Dept: MEDICAL GROUP | Facility: PHYSICIAN GROUP | Age: 55
End: 2025-07-14
Payer: COMMERCIAL

## 2025-07-14 VITALS
SYSTOLIC BLOOD PRESSURE: 104 MMHG | WEIGHT: 147.13 LBS | OXYGEN SATURATION: 98 % | DIASTOLIC BLOOD PRESSURE: 62 MMHG | TEMPERATURE: 98.3 F | HEART RATE: 92 BPM | BODY MASS INDEX: 25.12 KG/M2 | RESPIRATION RATE: 16 BRPM | HEIGHT: 64 IN

## 2025-07-14 DIAGNOSIS — Z13.29 SCREENING FOR ENDOCRINE, METABOLIC AND IMMUNITY DISORDER: ICD-10-CM

## 2025-07-14 DIAGNOSIS — E03.9 HYPOTHYROIDISM, UNSPECIFIED TYPE: Primary | ICD-10-CM

## 2025-07-14 DIAGNOSIS — E78.2 MIXED HYPERLIPIDEMIA: ICD-10-CM

## 2025-07-14 DIAGNOSIS — E55.9 VITAMIN D DEFICIENCY: ICD-10-CM

## 2025-07-14 DIAGNOSIS — G35 MS (MULTIPLE SCLEROSIS) (HCC): ICD-10-CM

## 2025-07-14 DIAGNOSIS — Z13.228 SCREENING FOR ENDOCRINE, METABOLIC AND IMMUNITY DISORDER: ICD-10-CM

## 2025-07-14 DIAGNOSIS — Z13.0 SCREENING FOR ENDOCRINE, METABOLIC AND IMMUNITY DISORDER: ICD-10-CM

## 2025-07-14 PROCEDURE — 3074F SYST BP LT 130 MM HG: CPT

## 2025-07-14 PROCEDURE — 99214 OFFICE O/P EST MOD 30 MIN: CPT

## 2025-07-14 PROCEDURE — 3078F DIAST BP <80 MM HG: CPT

## 2025-07-14 ASSESSMENT — FIBROSIS 4 INDEX: FIB4 SCORE: 0.74

## 2025-07-14 NOTE — PROGRESS NOTES
"Subjective:     Chief Complaint   Patient presents with    Thyroid Follow-up     History of Present Illness  The patient is here for a follow-up visit regarding her thyroid and other health concerns.    She reports that her thyroid condition improved after starting her MS medication but has since recurred. She is not experiencing any heart palpitations, diarrhea, or weight loss. However, she has noticed a weight gain of approximately 4 pounds and is having difficulty with bowel movements. She is not experiencing constipation, fatigue, hair loss, or brittle skin and nails. She is currently taking Synthroid.    She has been on medication for multiple sclerosis (MS) since 2017 and has not observed any side effects. There have been no changes in her condition as reported by Dr. Shelley. Her last consultation with him was in 06/2025, and she is scheduled for a follow-up in a year. She is also under his care for migraines, which are well-managed with her current medication.    She is considering the regular use of N-acetyl L-cysteine (NAC) and magnesium glycinate supplements.    Allergies: Sulfa drugs  ROS per HPI  Health Maintenance: Completed     Objective:     /62 (BP Location: Left arm, Patient Position: Sitting, BP Cuff Size: Adult)   Pulse 92   Temp 36.8 °C (98.3 °F) (Temporal)   Resp 16   Ht 1.626 m (5' 4\")   Wt 66.7 kg (147 lb 2 oz)   LMP 04/25/2018   SpO2 98%   Breastfeeding No   BMI 25.25 kg/m²  Body mass index is 25.25 kg/m².     Physical Exam  Vitals reviewed.   Constitutional:       General: She is not in acute distress.     Appearance: Normal appearance. She is not ill-appearing.   Cardiovascular:      Rate and Rhythm: Normal rate and regular rhythm.   Pulmonary:      Effort: Pulmonary effort is normal. No respiratory distress.   Skin:     Coloration: Skin is not jaundiced or pale.   Neurological:      General: No focal deficit present.      Mental Status: She is alert and oriented to person, " place, and time.   Psychiatric:         Mood and Affect: Mood normal.         Behavior: Behavior normal.         Thought Content: Thought content normal.         Judgment: Judgment normal.        Results  Labs   - Thyroid labs: Normal    Results for orders placed or performed during the hospital encounter of 04/08/25   THYROID PEROXIDASE  (TPO) AB    Collection Time: 04/08/25 11:27 AM   Result Value Ref Range    Microsomal -Tpo- Abs 151.0 (H) 0.0 - 9.0 IU/mL   TSH    Collection Time: 04/08/25 11:27 AM   Result Value Ref Range    TSH 2.890 0.380 - 5.330 uIU/mL   FREE THYROXINE    Collection Time: 04/08/25 11:27 AM   Result Value Ref Range    Free T-4 1.11 0.93 - 1.70 ng/dL      Assessment and Plan:     The following treatment plan was discussed through shared decision making with the patient:    1. Hypothyroidism, unspecified type  TSH    FREE THYROXINE      2. MS (multiple sclerosis) (HCC)        3. Mixed hyperlipidemia  Lipid Profile      4. Vitamin D deficiency  VITAMIN D,25 HYDROXY (DEFICIENCY)      5. Screening for endocrine, metabolic and immunity disorder  HEMOGLOBIN A1C        Assessment & Plan  1. Hypothyroidism.  - Thyroid function showed improvement earlier this year but has since deteriorated.  - No current symptoms of hyperthyroidism; patient reports weight gain and difficulty with bowel movements.  - Thyroid panel will be ordered today to assess current status; reminder set to order another thyroid panel in two weeks.  - Continue Synthroid regimen; refill provided.    2. Multiple Sclerosis (MS).  - No new symptoms or changes in condition; last follow-up with neurologist in 06/2025.  - Advised to consult with neurologist regarding the use of N-acetyl L-cysteine (NAC) and magnesium glycinate supplements to ensure no interactions with current medications.  - Continue current MS medication regimen.    Health Maintenance.  - A1c, cholesterol, and diabetes labs will be ordered today.  - Fasting labs,  including CBC and CMP, will be scheduled for a few months from now.    Return in about 6 months (around 1/14/2026) for Labs, Med Check.       Please note that this note was created using dictation with voice recognition software. I have made every reasonable attempt to correct obvious errors, but I expect that there are errors of grammar and possibly content that I did not discover before finalizing the note.    JESUS Watts  [facility]  [facility]

## 2025-07-18 ENCOUNTER — HOSPITAL ENCOUNTER (OUTPATIENT)
Dept: LAB | Facility: MEDICAL CENTER | Age: 55
End: 2025-07-18
Payer: COMMERCIAL

## 2025-07-18 DIAGNOSIS — E03.9 HYPOTHYROIDISM, UNSPECIFIED TYPE: ICD-10-CM

## 2025-07-18 LAB
T4 FREE SERPL-MCNC: 1.1 NG/DL (ref 0.93–1.7)
TSH SERPL-ACNC: 2.38 UIU/ML (ref 0.38–5.33)

## 2025-07-18 PROCEDURE — 84443 ASSAY THYROID STIM HORMONE: CPT

## 2025-07-18 PROCEDURE — 36415 COLL VENOUS BLD VENIPUNCTURE: CPT

## 2025-07-18 PROCEDURE — 84439 ASSAY OF FREE THYROXINE: CPT

## 2025-07-20 DIAGNOSIS — G43.009 MIGRAINE WITHOUT AURA AND WITHOUT STATUS MIGRAINOSUS, NOT INTRACTABLE: ICD-10-CM

## 2025-07-22 ENCOUNTER — TELEPHONE (OUTPATIENT)
Dept: PHARMACY | Facility: MEDICAL CENTER | Age: 55
End: 2025-07-22
Payer: COMMERCIAL

## 2025-07-22 RX ORDER — ZOLMITRIPTAN 5 MG/1
SPRAY NASAL
Qty: 6 EACH | Refills: 11 | Status: SHIPPED | OUTPATIENT
Start: 2025-07-22

## 2025-07-22 NOTE — TELEPHONE ENCOUNTER
Received New Start  request via MSOT  for zolmitriptan (ZOMIG) 5 MG nasal solution. (Quantity:6, Day Supply:30)     Insurance: [employer]  Member ID:  [id number]  BIN: [id number]  PCN: [id number]  Group: [id number]     Ran Test claim via [place] & medication Pays for a $7 copay. Will outreach to patient to offer specialty pharmacy services and or release to preferred pharmacy    Will release to pt's preferred pharmacy. Pt has already declined services.

## 2025-07-22 NOTE — TELEPHONE ENCOUNTER
Received request via: Pharmacy    Medication Name/Dosage Zolmitriptan    When was medication last prescribed 6/24/24    How many refills were previously provided 11    How many Refills does he patient have left from last prescription 0    Was the patient seen in the last year in this department? Yes   Date of last office visit 6/6/25     Per last Neurology Office Visit, when was the date of next follow up visit set for?    1 yr                         Date of office visit follow up request 6/6/26     Does the patient have an upcoming appointment? Yes   If yes, when 4/24/26             If no, schedule appointment     Does the patient have [facility] Plus and need 100 day supply (blood pressure, diabetes and cholesterol meds only)? Medication is not for blood pressure,diabetes, or cholesterol

## 2025-08-04 DIAGNOSIS — E03.9 HYPOTHYROIDISM, UNSPECIFIED TYPE: Primary | ICD-10-CM

## 2025-08-13 ENCOUNTER — PATIENT MESSAGE (OUTPATIENT)
Dept: MEDICAL GROUP | Facility: PHYSICIAN GROUP | Age: 55
End: 2025-08-13
Payer: COMMERCIAL

## 2025-08-13 DIAGNOSIS — G35 MS (MULTIPLE SCLEROSIS) (HCC): Primary | ICD-10-CM

## 2025-08-13 DIAGNOSIS — M54.6 ACUTE MIDLINE THORACIC BACK PAIN: ICD-10-CM

## 2025-08-14 ENCOUNTER — APPOINTMENT (OUTPATIENT)
Dept: URBAN - METROPOLITAN AREA CLINIC 22 | Facility: CLINIC | Age: 55
Setting detail: DERMATOLOGY
End: 2025-08-14

## 2025-08-14 DIAGNOSIS — D18.0 HEMANGIOMA: ICD-10-CM

## 2025-08-14 DIAGNOSIS — G35 MULTIPLE SCLEROSIS (HCC): Chronic | ICD-10-CM

## 2025-08-14 DIAGNOSIS — L82.1 OTHER SEBORRHEIC KERATOSIS: ICD-10-CM

## 2025-08-14 DIAGNOSIS — Z71.89 OTHER SPECIFIED COUNSELING: ICD-10-CM

## 2025-08-14 DIAGNOSIS — D22 MELANOCYTIC NEVI: ICD-10-CM

## 2025-08-14 DIAGNOSIS — L81.4 OTHER MELANIN HYPERPIGMENTATION: ICD-10-CM

## 2025-08-14 PROBLEM — D22.5 MELANOCYTIC NEVI OF TRUNK: Status: ACTIVE | Noted: 2025-08-14

## 2025-08-14 PROBLEM — D18.01 HEMANGIOMA OF SKIN AND SUBCUTANEOUS TISSUE: Status: ACTIVE | Noted: 2025-08-14

## 2025-08-14 PROCEDURE — ? SUNSCREEN RECOMMENDATIONS

## 2025-08-14 PROCEDURE — ? COUNSELING

## 2025-08-14 RX ORDER — MODAFINIL 200 MG/1
300 TABLET ORAL DAILY
Qty: 45 TABLET | Refills: 5 | Status: SHIPPED | OUTPATIENT
Start: 2025-08-14 | End: 2026-02-10

## 2025-08-14 ASSESSMENT — LOCATION ZONE DERM
LOCATION ZONE: FACE
LOCATION ZONE: TRUNK
LOCATION ZONE: ARM

## 2025-08-14 ASSESSMENT — LOCATION SIMPLE DESCRIPTION DERM
LOCATION SIMPLE: UPPER BACK
LOCATION SIMPLE: CHEST
LOCATION SIMPLE: ABDOMEN
LOCATION SIMPLE: INFERIOR FOREHEAD
LOCATION SIMPLE: RIGHT POSTERIOR UPPER ARM
LOCATION SIMPLE: LOWER BACK
LOCATION SIMPLE: RIGHT SHOULDER
LOCATION SIMPLE: LEFT SHOULDER
LOCATION SIMPLE: RIGHT FOREARM
LOCATION SIMPLE: LEFT FOREARM

## 2025-08-14 ASSESSMENT — LOCATION DETAILED DESCRIPTION DERM
LOCATION DETAILED: SUPERIOR LUMBAR SPINE
LOCATION DETAILED: RIGHT DISTAL POSTERIOR UPPER ARM
LOCATION DETAILED: LEFT VENTRAL PROXIMAL FOREARM
LOCATION DETAILED: INFERIOR MID FOREHEAD
LOCATION DETAILED: SUPERIOR THORACIC SPINE
LOCATION DETAILED: EPIGASTRIC SKIN
LOCATION DETAILED: RIGHT VENTRAL PROXIMAL FOREARM
LOCATION DETAILED: RIGHT POSTERIOR SHOULDER
LOCATION DETAILED: UPPER STERNUM
LOCATION DETAILED: LEFT POSTERIOR SHOULDER
LOCATION DETAILED: INFERIOR THORACIC SPINE